# Patient Record
Sex: FEMALE | Race: WHITE | NOT HISPANIC OR LATINO | Employment: FULL TIME | URBAN - METROPOLITAN AREA
[De-identification: names, ages, dates, MRNs, and addresses within clinical notes are randomized per-mention and may not be internally consistent; named-entity substitution may affect disease eponyms.]

---

## 2017-04-11 ENCOUNTER — APPOINTMENT (EMERGENCY)
Dept: RADIOLOGY | Facility: HOSPITAL | Age: 42
End: 2017-04-11
Payer: COMMERCIAL

## 2017-04-11 ENCOUNTER — HOSPITAL ENCOUNTER (EMERGENCY)
Facility: HOSPITAL | Age: 42
Discharge: HOME | End: 2017-04-11
Admitting: EMERGENCY MEDICINE
Payer: COMMERCIAL

## 2017-04-11 VITALS
BODY MASS INDEX: 35 KG/M2 | OXYGEN SATURATION: 97 % | DIASTOLIC BLOOD PRESSURE: 62 MMHG | RESPIRATION RATE: 18 BRPM | WEIGHT: 205 LBS | HEIGHT: 64 IN | TEMPERATURE: 99.4 F | HEART RATE: 90 BPM | SYSTOLIC BLOOD PRESSURE: 122 MMHG

## 2017-04-11 DIAGNOSIS — S93.401A RIGHT ANKLE SPRAIN: Primary | ICD-10-CM

## 2017-04-11 PROCEDURE — 73610 X-RAY EXAM OF ANKLE: CPT

## 2017-04-11 PROCEDURE — A9270 NON-COVERED ITEM OR SERVICE: HCPCS | Performed by: PHYSICIAN ASSISTANT

## 2017-04-11 PROCEDURE — 99283 EMERGENCY DEPT VISIT LOW MDM: CPT

## 2017-04-11 RX ORDER — NAPROXEN 500 MG/1
500 TABLET ORAL ONCE
Status: COMPLETED | OUTPATIENT
Start: 2017-04-11 | End: 2017-04-11

## 2017-04-11 RX ORDER — ESCITALOPRAM OXALATE 10 MG/1
40 TABLET ORAL 2 TIMES DAILY
COMMUNITY
End: 2018-02-26

## 2017-04-11 RX ORDER — NAPROXEN 500 MG/1
500 TABLET ORAL 2 TIMES DAILY WITH MEALS
Qty: 20 TABLET | Refills: 0 | Status: SHIPPED | OUTPATIENT
Start: 2017-04-11 | End: 2018-02-26

## 2017-04-11 RX ADMIN — NAPROXEN 500 MG: 500 TABLET ORAL at 14:24

## 2018-02-26 ENCOUNTER — HOSPITAL ENCOUNTER (EMERGENCY)
Facility: HOSPITAL | Age: 43
Discharge: HOME | End: 2018-02-26
Payer: OTHER MISCELLANEOUS

## 2018-02-26 ENCOUNTER — APPOINTMENT (EMERGENCY)
Dept: RADIOLOGY | Facility: HOSPITAL | Age: 43
End: 2018-02-26
Payer: OTHER MISCELLANEOUS

## 2018-02-26 VITALS
TEMPERATURE: 99.8 F | WEIGHT: 225 LBS | OXYGEN SATURATION: 96 % | RESPIRATION RATE: 20 BRPM | BODY MASS INDEX: 38.41 KG/M2 | SYSTOLIC BLOOD PRESSURE: 133 MMHG | DIASTOLIC BLOOD PRESSURE: 85 MMHG | HEIGHT: 64 IN | HEART RATE: 83 BPM

## 2018-02-26 DIAGNOSIS — M62.838 MUSCLE SPASM: Primary | ICD-10-CM
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
## 2018-02-26 PROCEDURE — 99283 EMERGENCY DEPT VISIT LOW MDM: CPT

## 2018-02-26 PROCEDURE — 96372 THER/PROPH/DIAG INJ SC/IM: CPT

## 2018-02-26 PROCEDURE — 72070 X-RAY EXAM THORAC SPINE 2VWS: CPT

## 2018-02-26 PROCEDURE — 71046 X-RAY EXAM CHEST 2 VIEWS: CPT

## 2018-02-26 RX ORDER — TRAMADOL HYDROCHLORIDE 50 MG/1
50 TABLET ORAL ONCE
Status: COMPLETED | OUTPATIENT
Start: 2018-02-26 | End: 2018-02-26

## 2018-02-26 RX ORDER — ARIPIPRAZOLE 5 MG/1
5 TABLET ORAL DAILY
COMMUNITY
End: 2019-12-04

## 2018-02-26 RX ORDER — ESCITALOPRAM OXALATE 20 MG/1
20 TABLET ORAL DAILY
COMMUNITY
End: 2021-09-02 | Stop reason: SDUPTHER

## 2018-02-26 RX ORDER — KETOROLAC TROMETHAMINE 30 MG/ML
30 INJECTION, SOLUTION INTRAMUSCULAR; INTRAVENOUS ONCE
Status: COMPLETED | OUTPATIENT
Start: 2018-02-26 | End: 2018-02-26

## 2018-02-26 RX ORDER — CYCLOBENZAPRINE HCL 10 MG
10 TABLET ORAL 3 TIMES DAILY PRN
Qty: 12 TABLET | Refills: 0 | Status: SHIPPED | OUTPATIENT
Start: 2018-02-26 | End: 2018-02-28 | Stop reason: SDUPTHER

## 2018-02-26 RX ORDER — NAPROXEN 500 MG/1
500 TABLET ORAL 2 TIMES DAILY WITH MEALS
Qty: 20 TABLET | Refills: 0 | Status: SHIPPED | OUTPATIENT
Start: 2018-02-26 | End: 2019-12-04

## 2018-02-26 RX ADMIN — TRAMADOL HYDROCHLORIDE 50 MG: 50 TABLET, FILM COATED ORAL at 16:31

## 2018-02-26 RX ADMIN — KETOROLAC TROMETHAMINE 30 MG: 30 INJECTION, SOLUTION INTRAMUSCULAR at 17:35

## 2018-02-26 NOTE — DISCHARGE INSTRUCTIONS
Muscle Spasm   WHAT YOU NEED TO KNOW:   A muscle spasm is a sudden contraction of any muscle or group of muscles  A muscle cramp is a painful muscle spasm  Muscle cramps commonly occur after intense exercise or during pregnancy  They may also be caused by certain medications, dehydration, low calcium or magnesium levels, or another medical condition  DISCHARGE INSTRUCTIONS:   Medicines: You may need the following:  · NSAIDs  help decrease swelling and pain or fever  This medicine is available with or without a doctor's order  NSAIDs can cause stomach bleeding or kidney problems in certain people  If you take blood thinner medicine, always ask your healthcare provider if NSAIDs are safe for you  Always read the medicine label and follow directions  · Take your medicine as directed  Contact your healthcare provider if you think your medicine is not helping or if you have side effects  Tell him of her if you are allergic to any medicine  Keep a list of the medicines, vitamins, and herbs you take  Include the amounts, and when and why you take them  Bring the list or the pill bottles to follow-up visits  Carry your medicine list with you in case of an emergency  Follow up with your healthcare provider as directed: You may need other tests or treatment  You may also be referred to a physical therapist or other specialist  Write down your questions so you remember to ask them during your visits  Self-care:   · Stretch  your muscle to help relieve the cramp  It may be helpful to keep your muscle in the stretched position until the cramp is gone  · Apply heat  to help decrease pain and muscle spasms  Apply heat on the area for 20 to 30 minutes every 2 hours for as many days as directed  · Apply ice  to help decrease swelling and pain  Ice may also help prevent tissue damage  Use an ice pack, or put crushed ice in a plastic bag   Cover it with a towel and place it on your muscle for 15 to 20 minutes every hour or as directed  · Drink more liquids  to help prevent muscle cramps caused by dehydration  Sports drinks may help replace electrolytes you lose through sweat during exercise  Ask your healthcare provider how much liquid to drink each day and which liquids are best for you  · Eat healthy foods , such as fruits, vegetables, whole grains, low-fat dairy products, and lean proteins (meat, beans, and fish)  If you are pregnant, ask your healthcare provider about foods that are high in magnesium and sodium  They may help to relieve cramps during pregnancy  · Massage your muscle  to help relieve the cramp  · Take frequent deep breaths  until the cramp feels better  Lie down while you take the deep breaths so you do not get dizzy or lightheaded  Contact your healthcare provider if:   · You have signs of dehydration, such as a headache, dark yellow urine, dry eyes or mouth, or a fast heartbeat  · You have questions or concerns about your condition or care  Return to the emergency department if:   · You have warmth, swelling, or redness in the cramping muscle  · You have frequent or unrelieved muscle cramps in several different muscles  · You have muscle cramps with numbness, tingling, and burning in your hands and feet  © 2017 [address] Information is for End User's use only and may not be sold, redistributed or otherwise used for commercial purposes  All illustrations and images included in CareNotes® are the copyrighted property of [employer]  or Abelardo Valentin  The above information is an  only  It is not intended as medical advice for individual conditions or treatments  Talk to your doctor, nurse or pharmacist before following any medical regimen to see if it is safe and effective for you

## 2018-02-26 NOTE — ED PROVIDER NOTES
History  Chief Complaint   Patient presents with   Drea U  6  she slipped on water in [place] at work and fell to left side at 1pm  States pain mid and lower back  Denies striking head no LOC  37 y/o female the patient 4 hours ago while she was at work when she slipped on water landing directly onto her left side  Did not hit her head nor lose consciousness  Complaining of mid to lower back pain since that point that comes and goes and is spasm like  Took ibuprofen at 1pm  Mild headache  Ambulating without difficulty  The pain is currently 7/10 that is nonradiating  Denies nausea, vomiting, changes in vision, weakness, numbness, paresthesias, shortness of breath, chest pain, abdominal pain, hip or leg pain  Prior to Admission Medications   Prescriptions Last Dose Informant Patient Reported? Taking? ARIPiprazole (ABILIFY) 5 mg tablet  Self Yes Yes   Sig: Take 5 mg by mouth daily   escitalopram (LEXAPRO) 20 mg tablet 2018 at 0700  Yes Yes   Sig: Take 20 mg by mouth 2 (two) times a day      Facility-Administered Medications: None       Past Medical History:   Diagnosis Date    Psychiatric disorder        Past Surgical History:   Procedure Laterality Date     SECTION      HAND SURGERY      OVARIAN CYST DRAINAGE      WRIST SURGERY         History reviewed  No pertinent family history  I have reviewed and agree with the history as documented  Social History   Substance Use Topics    Smoking status: Former Smoker    Smokeless tobacco: Never Used    Alcohol use Yes        Review of Systems   Constitutional: Negative  Negative for activity change and appetite change  HENT: Negative  Eyes: Negative  Respiratory: Negative  Cardiovascular: Negative  Gastrointestinal: Negative  Genitourinary: Negative  Musculoskeletal: Positive for arthralgias and back pain  Negative for gait problem, joint swelling, myalgias, neck pain and neck stiffness  Skin: Negative  Neurological: Positive for headaches  Negative for dizziness, tremors, seizures, syncope, facial asymmetry, speech difficulty, weakness, light-headedness and numbness  All other systems reviewed and are negative  Physical Exam  ED Triage Vitals [02/26/18 1542]   Temperature Pulse Respirations Blood Pressure SpO2   99 8 °F (37 7 °C) 83 20 133/85 96 %      Temp Source Heart Rate Source Patient Position - Orthostatic VS BP Location FiO2 (%)   Tympanic Monitor Sitting Left arm --      Pain Score       7           Orthostatic Vital Signs  Vitals:    02/26/18 1542   BP: 133/85   Pulse: 83   Patient Position - Orthostatic VS: Sitting       Physical Exam   Constitutional: She appears well-developed and well-nourished  HENT:   Head: Normocephalic and atraumatic  Right Ear: External ear normal    Left Ear: External ear normal    Nose: Nose normal    Mouth/Throat: Oropharynx is clear and moist    Eyes: Conjunctivae and EOM are normal  Pupils are equal, round, and reactive to light  Neck: Normal range of motion  Neck supple  Cardiovascular: Normal rate, regular rhythm, normal heart sounds and intact distal pulses  Exam reveals no gallop and no friction rub  No murmur heard  Pulmonary/Chest: Effort normal and breath sounds normal  No respiratory distress  She has no wheezes  She has no rales  She exhibits no tenderness  spo2 is 96% indicating adequate oxygenation  Abdominal: Soft  Bowel sounds are normal  She exhibits no distension and no mass  There is no tenderness  There is no rebound and no guarding  No hernia  Musculoskeletal:        Arms:  Neurological: She is alert  She has normal strength  No cranial nerve deficit or sensory deficit  GCS eye subscore is 4  GCS verbal subscore is 5  GCS motor subscore is 6  Strength and sensation 5/5  Diffusely  Skin: Skin is warm and dry  Capillary refill takes less than 2 seconds  Nursing note and vitals reviewed        ED Medications  Medications traMADol (ULTRAM) tablet 50 mg (50 mg Oral Given 2/26/18 1631)       Diagnostic Studies  Results Reviewed     None                 XR thoracic spine 2 views    (Results Pending)   XR chest 2 views    (Results Pending)              Procedures  Procedures       Phone Contacts  ED Phone Contact    ED Course  ED Course                                MDM  Number of Diagnoses or Management Options  Diagnosis management comments: Will treat for muscle spasms with Flexeril, informed not to drive or operate heavy machinery while taking  Strict return precautions for any worsening symptoms  Patient's injury occurred over 5 hours ago and does not have any neurological changes however if her headaches persist she should follow up with the concussion Clinic  Patient is informed to return to the emergency department for worsening of symptoms and was given proper education regarding their diagnosis and symptoms  Otherwise the patient is informed to follow up with their primary care doctor for re-evaluation  The patient verbalizes understanding and agrees with above assessment and plan  All questions were answered  Please Note: Fluency Direct voice recognition software may have been used in the creation of this document  Wrong words or sound a like substitutions may have occurred due to the inherent limitations of the voice software             Amount and/or Complexity of Data Reviewed  Tests in the radiology section of CPT®: reviewed and ordered  Review and summarize past medical records: yes  Independent visualization of images, tracings, or specimens: yes      CritCare Time    Disposition  Final diagnoses:   Muscle spasm   Fall, initial encounter     Time reflects when diagnosis was documented in both MDM as applicable and the Disposition within this note     Time User Action Codes Description Comment    2/26/2018  5:20 PM Pauline Kawasaki Add [A07 113] Muscle spasm     2/26/2018  5:20 PM Pauline Kawasaki Add [W19 XXXA] Fall, initial encounter       ED Disposition     ED Disposition Condition Comment    Discharge  Camryn discharge to home/self care  Condition at discharge: Good        Follow-up Information     Follow up With Specialties Details Why Contact Info Additional [address] Emergency Department Emergency Medicine Go to If symptoms worsen such as double vision, weakness  [address] [facility], [place], [place], [address]    Noelle Mccormack MD Orthopedic Surgery Schedule an appointment as soon as possible for a visit if headaches persist  [address]  [phone]           Patient's Medications   Discharge Prescriptions    CYCLOBENZAPRINE (FLEXERIL) 10 MG TABLET    Take 1 tablet (10 mg total) by mouth 3 (three) times a day as needed for muscle spasms for up to 4 days       Start Date: 2/26/2018 End Date: 3/2/2018       Order Dose: 10 mg       Quantity: 12 tablet    Refills: 0    NAPROXEN (NAPROSYN) 500 MG TABLET    Take 1 tablet (500 mg total) by mouth 2 (two) times a day with meals for 10 days       Start Date: 2/26/2018 End Date: 3/8/2018       Order Dose: 500 mg       Quantity: 20 tablet    Refills: 0     No discharge procedures on file      ED Provider  Electronically Signed by           Landry Fisher PA-C  02/26/18 7857

## 2018-02-28 ENCOUNTER — OFFICE VISIT (OUTPATIENT)
Dept: FAMILY MEDICINE CLINIC | Facility: CLINIC | Age: 43
End: 2018-02-28
Payer: OTHER MISCELLANEOUS

## 2018-02-28 VITALS
HEART RATE: 88 BPM | TEMPERATURE: 97.2 F | RESPIRATION RATE: 20 BRPM | WEIGHT: 225 LBS | DIASTOLIC BLOOD PRESSURE: 70 MMHG | SYSTOLIC BLOOD PRESSURE: 126 MMHG | HEIGHT: 64 IN | BODY MASS INDEX: 38.41 KG/M2

## 2018-02-28 DIAGNOSIS — M62.838 MUSCLE SPASM: ICD-10-CM

## 2018-02-28 DIAGNOSIS — S39.012A BACK STRAIN, INITIAL ENCOUNTER: Primary | ICD-10-CM

## 2018-02-28 PROCEDURE — 99202 OFFICE O/P NEW SF 15 MIN: CPT | Performed by: NURSE PRACTITIONER

## 2018-02-28 RX ORDER — PREDNISONE 10 MG/1
TABLET ORAL
Qty: 30 TABLET | Refills: 0 | Status: SHIPPED | OUTPATIENT
Start: 2018-02-28 | End: 2018-03-12

## 2018-02-28 RX ORDER — CYCLOBENZAPRINE HCL 10 MG
10 TABLET ORAL
Qty: 6 TABLET | Refills: 0 | Status: SHIPPED | OUTPATIENT
Start: 2018-02-28 | End: 2019-12-04

## 2018-02-28 RX ORDER — ACETAMINOPHEN AND CODEINE PHOSPHATE 300; 30 MG/1; MG/1
1 TABLET ORAL EVERY 8 HOURS PRN
Qty: 12 TABLET | Refills: 0 | Status: SHIPPED | OUTPATIENT
Start: 2018-02-28 | End: 2018-03-04

## 2018-02-28 NOTE — LETTER
February 28, 2018     Patient: Maria C Latham   YOB: 1975   Date of Visit: 2/28/2018       To Whom it May Concern:    Maria C Latham is under my professional care  She was seen in my office on 2/28/2018 and can return to work on 3/5/2018 if symptom free and follow up on 3/5/2018 if still having issues  If you have any questions or concerns, please don't hesitate to call           Sincerely,          RENATO Patel        CC: No Recipients

## 2018-02-28 NOTE — PATIENT INSTRUCTIONS
Take prednisone with food in morning and do not take any NSAID's while taking prednisone  Do not drive and operate any machinery after taking muscle relaxant  Supportive care discussed and advised  Follow up for no improvement and worsening of conditions  Patient advised and educated when to see immediate medical care  Acute Low Back Pain   AMBULATORY CARE:   Acute low back pain  is sudden discomfort in your lower back area that lasts for up to 6 weeks  The discomfort makes it difficult to tolerate activity  Common symptoms include the following:   · Back stiffness or spasms    · Pain down the back or side of one leg    · Holding yourself in an unusual position or posture to decrease your back pain    · Not being able to find a sitting position that is comfortable    · Slow increase in your pain for 24 to 48 hours after you stress your back    · Tenderness on your lower back or severe pain when you move your back  Seek immediate care for the following symptoms:   · Severe pain    · Sudden stiffness and heaviness in both buttocks down to both legs    · Numbness or weakness in one leg, or pain in both legs    · Numbness in your genital area or across your lower back    · Unable to control your urine or bowel movements  Contact your healthcare provider if:   · You have a fever  · You have pain at night or when you rest     · Your pain does not get better with treatment  · You have pain that worsens when you cough or sneeze  · You suddenly feel something pop or snap in your back  · You have questions or concerns about your condition or care  The goal of treatment for acute low back pain  is to relieve your pain and help you tolerate activity  Most people with acute lower back pain get better within 4 to 6 weeks  You may need any of the following:  · Acetaminophen  decreases pain  It is available without a doctor's order  Ask how much to take and how often to take it  Follow directions  Acetaminophen can cause liver damage if not taken correctly  · NSAIDs  help decrease swelling and pain  This medicine is available with or without a doctor's order  NSAIDs can cause stomach bleeding or kidney problems in certain people  If you take blood thinner medicine, always ask your healthcare provider if NSAIDs are safe for you  Always read the medicine label and follow directions  · Prescription pain medicine  may be given  Ask your healthcare provider how to take this medicine safely  · Muscle relaxers  decrease pain by relaxing the muscles in your lower spine  Manage your symptoms:   · Stay active  as much as you can without causing more pain  Bed rest could make your back pain worse  Start with some light exercises such as walking  Avoid heavy lifting until your pain is gone  Ask for more information about the activities or exercises that are right for you  · Ice  helps decrease swelling, pain, and muscle spams  Put crushed ice in a plastic bag  Cover it with a towel  Place it on your lower back for 20 to 30 minutes every 2 hours  Do this for about 2 to 3 days after your pain starts, or as directed  · Heat  helps decrease pain and muscle spasms  Start to use heat after treatment with ice has stopped  Use a small towel dampened with warm water or a heating pad, or sit in a warm bath  Apply heat on the area for 20 to 30 minutes every 2 hours for as many days as directed  Alternate heat and ice  Prevent acute low back pain:   · Use proper body mechanics  ¨ Bend at the hips and knees when you  objects  Do not bend from the waist  Use your leg muscles as you lift the load  Do not use your back  Keep the object close to your chest as you lift it  Try not to twist or lift anything above your waist     ¨ Change your position often when you stand for long periods of time  Rest one foot on a small box or footrest, and then switch to the other foot often      ¨ Try not to sit for long periods of time  When you do, sit in a straight-backed chair with your feet flat on the floor  Never reach, pull, or push while you are sitting  · Do exercises that strengthen your back muscles  Warm up before you exercise  Ask your healthcare provider the best exercises for you  · Maintain a healthy weight  Ask your healthcare provider how much you should weigh  Ask him to help you create a weight loss plan if you are overweight  Follow up with your healthcare provider as directed:  Return for a follow-up visit if you still have pain after 1 to 3 weeks of treatment  You may need to visit an orthopedist if your back pain lasts more than 12 weeks  Write down your questions so you remember to ask them during your visits  © 2017 [address] [facility] Information is for End User's use only and may not be sold, redistributed or otherwise used for commercial purposes  All illustrations and images included in CareNotes® are the copyrighted property of A [employer]  or Abelardo Valentin  The above information is an  only  It is not intended as medical advice for individual conditions or treatments  Talk to your doctor, nurse or pharmacist before following any medical regimen to see if it is safe and effective for you

## 2018-02-28 NOTE — PROGRESS NOTES
Assessment/Plan:  Stop naprosyn  Take prednisone with food in morning and do not take any NSAID's while taking prednisone  Do not drive and operate any machinery after taking muscle relaxant  Supportive care discussed and advised  Follow up for no improvement and worsening of conditions  Patient advised and educated when to see immediate medical care  Diagnoses and all orders for this visit:    Back strain, initial encounter  -     predniSONE 10 mg tablet; Take 4 pills/d for 3 days then 3 pills/d for 3 days, then 2 pills/d for 3 days then 1 pill/d for 3 days  -     acetaminophen-codeine (TYLENOL #3) 300-30 mg per tablet; Take 1 tablet by mouth every 8 (eight) hours as needed for moderate pain for up to 4 days (Collaborative PHYS: Dr Lucian Bunch, License # [id number])    Muscle spasm  -     cyclobenzaprine (FLEXERIL) 10 mg tablet; Take 1 tablet (10 mg total) by mouth daily at bedtime for 6 days  -     acetaminophen-codeine (TYLENOL #3) 300-30 mg per tablet; Take 1 tablet by mouth every 8 (eight) hours as needed for moderate pain for up to 4 days (Collaborative PHYS: Dr Lucian Bunch, License # [id number])    BMI 36 0-36 9,adult          Return for Return on 3/5/18 if still having symptoms  Subjective:      Patient ID: Tl Munoz is a 38 y o  female  Chief Complaint   Patient presents with    Fall     02/26/2018  at [facility] in [facility] [address] was wet from water bottle  [id number]       HPI   Patient fell at work on 2/26 and as slipped on the water and landed on the left side and went to ED due to mid to lower back  Patient was discharged on naprosyn and flexeril and stated that did not help and having pain in the mid to lower back with spasms  Denies weakness, numbness and tingling of any extremities  Denies headache and dizziness       The following portions of the patient's history were reviewed and updated as appropriate: allergies, current medications, past family history, past medical history, past social history, past surgical history and problem list       Review of Systems   Constitutional: Negative  Respiratory: Negative  Cardiovascular: Negative  Musculoskeletal: Positive for back pain  Negative for arthralgias, gait problem, joint swelling, myalgias, neck pain and neck stiffness  Neurological: Negative  Objective:    History   Smoking Status    Former Smoker   Smokeless Tobacco    Never Used       Allergies: Allergies   Allergen Reactions    Duricef [Cefadroxil]     Penicillins        Vitals:  /70   Pulse 88   Temp (!) 97 2 °F (36 2 °C)   Resp 20   Ht 5' 4" (1 626 m)   Wt 102 kg (225 lb)   BMI 38 62 kg/m²     Current Outpatient Prescriptions   Medication Sig Dispense Refill    ARIPiprazole (ABILIFY) 5 mg tablet Take 5 mg by mouth daily      cyclobenzaprine (FLEXERIL) 10 mg tablet Take 1 tablet (10 mg total) by mouth daily at bedtime for 6 days 6 tablet 0    escitalopram (LEXAPRO) 20 mg tablet Take 20 mg by mouth 2 (two) times a day      naproxen (NAPROSYN) 500 mg tablet Take 1 tablet (500 mg total) by mouth 2 (two) times a day with meals for 10 days 20 tablet 0    acetaminophen-codeine (TYLENOL #3) 300-30 mg per tablet Take 1 tablet by mouth every 8 (eight) hours as needed for moderate pain for up to 4 days (Collaborative PHYS: Dr Delta King, License # [id number]) 12 tablet 0    predniSONE 10 mg tablet Take 4 pills/d for 3 days then 3 pills/d for 3 days, then 2 pills/d for 3 days then 1 pill/d for 3 days 30 tablet 0     No current facility-administered medications for this visit  Physical Exam   Constitutional: She is oriented to person, place, and time  She appears well-developed and well-nourished  Cardiovascular: Normal rate, regular rhythm and normal heart sounds  Pulmonary/Chest: Effort normal and breath sounds normal    Musculoskeletal: Normal range of motion  She exhibits tenderness  She exhibits no edema or deformity  Arms:  Neurological: She is alert and oriented to person, place, and time  She has normal reflexes  Skin: Skin is warm and dry  Psychiatric: She has a normal mood and affect   Her behavior is normal  Judgment and thought content normal          RENATO Thompson

## 2018-03-05 ENCOUNTER — OFFICE VISIT (OUTPATIENT)
Dept: FAMILY MEDICINE CLINIC | Facility: CLINIC | Age: 43
End: 2018-03-05
Payer: OTHER MISCELLANEOUS

## 2018-03-05 VITALS
BODY MASS INDEX: 42.17 KG/M2 | WEIGHT: 247 LBS | SYSTOLIC BLOOD PRESSURE: 122 MMHG | DIASTOLIC BLOOD PRESSURE: 80 MMHG | HEIGHT: 64 IN | TEMPERATURE: 96.3 F | HEART RATE: 76 BPM | RESPIRATION RATE: 16 BRPM

## 2018-03-05 DIAGNOSIS — M62.838 MUSCLE SPASM: ICD-10-CM

## 2018-03-05 DIAGNOSIS — S39.012S BACK STRAIN, SEQUELA: Primary | ICD-10-CM

## 2018-03-05 PROCEDURE — 99213 OFFICE O/P EST LOW 20 MIN: CPT | Performed by: NURSE PRACTITIONER

## 2018-03-05 RX ORDER — VALACYCLOVIR HYDROCHLORIDE 500 MG/1
500 TABLET, FILM COATED ORAL DAILY
COMMUNITY
Start: 2018-01-05 | End: 2021-02-25 | Stop reason: SDUPTHER

## 2018-03-05 RX ORDER — ACETAMINOPHEN AND CODEINE PHOSPHATE 300; 30 MG/1; MG/1
1 TABLET ORAL EVERY 8 HOURS PRN
Qty: 6 TABLET | Refills: 0 | Status: SHIPPED | OUTPATIENT
Start: 2018-03-05 | End: 2018-03-07

## 2018-03-05 RX ORDER — TOPIRAMATE 100 MG/1
TABLET, FILM COATED ORAL
Refills: 0 | COMMUNITY
Start: 2018-02-28 | End: 2019-12-04

## 2018-03-05 RX ORDER — PHENTERMINE HYDROCHLORIDE 37.5 MG/1
TABLET ORAL
COMMUNITY
Start: 2018-02-26 | End: 2019-12-04

## 2018-03-05 RX ORDER — ACETAMINOPHEN AND CODEINE PHOSPHATE 300; 30 MG/1; MG/1
1 TABLET ORAL EVERY 8 HOURS PRN
Qty: 6 TABLET | Refills: 0 | Status: SHIPPED | OUTPATIENT
Start: 2018-03-05 | End: 2018-03-05 | Stop reason: SDUPTHER

## 2018-03-05 RX ORDER — ACETAMINOPHEN AND CODEINE PHOSPHATE 300; 30 MG/1; MG/1
1 TABLET ORAL EVERY 8 HOURS PRN
Qty: 30 TABLET | Refills: 0 | Status: SHIPPED | OUTPATIENT
Start: 2018-03-05 | End: 2018-03-05 | Stop reason: CLARIF

## 2018-03-05 NOTE — PROGRESS NOTES
Assessment/Plan:  Finish prednisone and flexeril  Improved than before  Take extra days rest as improving  Supportive care discussed and advised  Follow up for no improvement and worsening of conditions  Patient advised and educated when to see immediate medical care  Diagnoses and all orders for this visit:    Back strain, sequela  -     acetaminophen-codeine (TYLENOL #3) 300-30 mg per tablet; Take 1 tablet by mouth every 8 (eight) hours as needed for moderate pain for up to 2 days (Collaborative PHYS: Dr Charles Beard, License # [id number])    Muscle spasm  -     acetaminophen-codeine (TYLENOL #3) 300-30 mg per tablet; Take 1 tablet by mouth every 8 (eight) hours as needed for moderate pain for up to 2 days (Collaborative PHYS: Dr Charles Beard, License # [id number])    BMI 40 0-44 9, [facility])    Other orders  -     phentermine (ADIPEX-P) 37 5 MG tablet;   -     topiramate (TOPAMAX) 100 mg tablet;   -     valACYclovir (VALTREX) 500 mg tablet;           Return if symptoms worsen or fail to improve  Subjective:      Patient ID: Samir Byrd is a 38 y o  female  Chief Complaint   Patient presents with    workers comp  back pain follow up , lower back       HPI   Patient here for follow up on work injury and still taking prednisone and flexeril  Patient stated that it is improved and not radiating to her upper back and just staying on lower back with spasms  Denies any weakness, numbness and tingling in any extremities  The following portions of the patient's history were reviewed and updated as appropriate: allergies, current medications, past family history, past medical history, past social history, past surgical history and problem list       Review of Systems   Constitutional: Negative  Respiratory: Negative  Cardiovascular: Negative  Musculoskeletal: Positive for back pain  Negative for gait problem, joint swelling, myalgias, neck pain and neck stiffness  Neurological: Negative  Psychiatric/Behavioral: Negative  Objective:    History   Smoking Status    Former Smoker   Smokeless Tobacco    Never Used       Allergies: Allergies   Allergen Reactions    Duricef [Cefadroxil]     Penicillins        Vitals:  /80   Pulse 76   Temp (!) 96 3 °F (35 7 °C)   Resp 16   Ht 5' 4" (1 626 m)   Wt 112 kg (247 lb)   BMI 42 40 kg/m²     Current Outpatient Prescriptions   Medication Sig Dispense Refill    ARIPiprazole (ABILIFY) 5 mg tablet Take 5 mg by mouth daily      cyclobenzaprine (FLEXERIL) 10 mg tablet Take 1 tablet (10 mg total) by mouth daily at bedtime for 6 days 6 tablet 0    escitalopram (LEXAPRO) 20 mg tablet Take 20 mg by mouth 2 (two) times a day      phentermine (ADIPEX-P) 37 5 MG tablet       predniSONE 10 mg tablet Take 4 pills/d for 3 days then 3 pills/d for 3 days, then 2 pills/d for 3 days then 1 pill/d for 3 days 30 tablet 0    topiramate (TOPAMAX) 100 mg tablet   0    valACYclovir (VALTREX) 500 mg tablet       acetaminophen-codeine (TYLENOL #3) 300-30 mg per tablet Take 1 tablet by mouth every 8 (eight) hours as needed for moderate pain for up to 2 days (Collaborative PHYS: Dr Luis Alfredo Bahena, License # [id number]) 6 tablet 0    naproxen (NAPROSYN) 500 mg tablet Take 1 tablet (500 mg total) by mouth 2 (two) times a day with meals for 10 days 20 tablet 0     No current facility-administered medications for this visit  Physical Exam   Constitutional: She is oriented to person, place, and time  She appears well-developed and well-nourished  Cardiovascular: Normal rate, regular rhythm and normal heart sounds  Pulmonary/Chest: Effort normal and breath sounds normal    Musculoskeletal: Normal range of motion  She exhibits tenderness  She exhibits no edema  Tender on bilateral paraspinal musculature  Neurological: She is alert and oriented to person, place, and time  She has normal reflexes  Psychiatric: She has a normal mood and affect   Her behavior is normal  Judgment and thought content normal          RENATO Gonsalez

## 2018-03-05 NOTE — PATIENT INSTRUCTIONS
Finish prednisone and flexeril  Take extra days rest   Supportive care discussed and advised  Follow up for no improvement and worsening of conditions  Patient advised and educated when to see immediate medical care  Acute Low Back Pain   AMBULATORY CARE:   Acute low back pain  is sudden discomfort in your lower back area that lasts for up to 6 weeks  The discomfort makes it difficult to tolerate activity  Common symptoms include the following:   · Back stiffness or spasms    · Pain down the back or side of one leg    · Holding yourself in an unusual position or posture to decrease your back pain    · Not being able to find a sitting position that is comfortable    · Slow increase in your pain for 24 to 48 hours after you stress your back    · Tenderness on your lower back or severe pain when you move your back  Seek immediate care for the following symptoms:   · Severe pain    · Sudden stiffness and heaviness in both buttocks down to both legs    · Numbness or weakness in one leg, or pain in both legs    · Numbness in your genital area or across your lower back    · Unable to control your urine or bowel movements  Contact your healthcare provider if:   · You have a fever  · You have pain at night or when you rest     · Your pain does not get better with treatment  · You have pain that worsens when you cough or sneeze  · You suddenly feel something pop or snap in your back  · You have questions or concerns about your condition or care  The goal of treatment for acute low back pain  is to relieve your pain and help you tolerate activity  Most people with acute lower back pain get better within 4 to 6 weeks  You may need any of the following:  · Acetaminophen  decreases pain  It is available without a doctor's order  Ask how much to take and how often to take it  Follow directions  Acetaminophen can cause liver damage if not taken correctly  · NSAIDs  help decrease swelling and pain   This medicine is available with or without a doctor's order  NSAIDs can cause stomach bleeding or kidney problems in certain people  If you take blood thinner medicine, always ask your healthcare provider if NSAIDs are safe for you  Always read the medicine label and follow directions  · Prescription pain medicine  may be given  Ask your healthcare provider how to take this medicine safely  · Muscle relaxers  decrease pain by relaxing the muscles in your lower spine  Manage your symptoms:   · Stay active  as much as you can without causing more pain  Bed rest could make your back pain worse  Start with some light exercises such as walking  Avoid heavy lifting until your pain is gone  Ask for more information about the activities or exercises that are right for you  · Ice  helps decrease swelling, pain, and muscle spams  Put crushed ice in a plastic bag  Cover it with a towel  Place it on your lower back for 20 to 30 minutes every 2 hours  Do this for about 2 to 3 days after your pain starts, or as directed  · Heat  helps decrease pain and muscle spasms  Start to use heat after treatment with ice has stopped  Use a small towel dampened with warm water or a heating pad, or sit in a warm bath  Apply heat on the area for 20 to 30 minutes every 2 hours for as many days as directed  Alternate heat and ice  Prevent acute low back pain:   · Use proper body mechanics  ¨ Bend at the hips and knees when you  objects  Do not bend from the waist  Use your leg muscles as you lift the load  Do not use your back  Keep the object close to your chest as you lift it  Try not to twist or lift anything above your waist     ¨ Change your position often when you stand for long periods of time  Rest one foot on a small box or footrest, and then switch to the other foot often  ¨ Try not to sit for long periods of time  When you do, sit in a straight-backed chair with your feet flat on the floor   Never reach, pull, or push while you are sitting  · Do exercises that strengthen your back muscles  Warm up before you exercise  Ask your healthcare provider the best exercises for you  · Maintain a healthy weight  Ask your healthcare provider how much you should weigh  Ask him to help you create a weight loss plan if you are overweight  Follow up with your healthcare provider as directed:  Return for a follow-up visit if you still have pain after 1 to 3 weeks of treatment  You may need to visit an orthopedist if your back pain lasts more than 12 weeks  Write down your questions so you remember to ask them during your visits  © 2017 [employer] Information is for End User's use only and may not be sold, redistributed or otherwise used for commercial purposes  All illustrations and images included in CareNotes® are the copyrighted property of A D A M , Inc  or Abelardo Valentin  The above information is an  only  It is not intended as medical advice for individual conditions or treatments  Talk to your doctor, nurse or pharmacist before following any medical regimen to see if it is safe and effective for you

## 2018-03-05 NOTE — LETTER
March 5, 2018     Patient: Alejandra Jacobo   YOB: 1975   Date of Visit: 3/5/2018       To Whom it May Concern:    Alejandra Jacobo is under my professional care  She was seen in my office on 3/5/2018  She may return to work on 03/8/2018  If you have any questions or concerns, please don't hesitate to call           Sincerely,          Guyann Goodpasture, CRNP        CC: No Recipients

## 2019-12-04 ENCOUNTER — HOSPITAL ENCOUNTER (EMERGENCY)
Facility: HOSPITAL | Age: 44
Discharge: HOME | End: 2019-12-04
Attending: EMERGENCY MEDICINE
Payer: OTHER MISCELLANEOUS

## 2019-12-04 ENCOUNTER — APPOINTMENT (EMERGENCY)
Dept: RADIOLOGY | Facility: HOSPITAL | Age: 44
End: 2019-12-04
Payer: OTHER MISCELLANEOUS

## 2019-12-04 ENCOUNTER — TELEPHONE (OUTPATIENT)
Dept: OBGYN CLINIC | Facility: HOSPITAL | Age: 44
End: 2019-12-04

## 2019-12-04 VITALS
HEIGHT: 64 IN | BODY MASS INDEX: 46.1 KG/M2 | HEART RATE: 72 BPM | WEIGHT: 270 LBS | RESPIRATION RATE: 18 BRPM | TEMPERATURE: 97 F | DIASTOLIC BLOOD PRESSURE: 96 MMHG | SYSTOLIC BLOOD PRESSURE: 163 MMHG | OXYGEN SATURATION: 97 %

## 2019-12-04 DIAGNOSIS — M25.521 ELBOW PAIN, RIGHT: Primary | ICD-10-CM

## 2019-12-04 PROCEDURE — 73080 X-RAY EXAM OF ELBOW: CPT

## 2019-12-04 PROCEDURE — 99283 EMERGENCY DEPT VISIT LOW MDM: CPT

## 2019-12-04 RX ORDER — TRAMADOL HYDROCHLORIDE 50 MG/1
50 TABLET ORAL EVERY 6 HOURS PRN
Qty: 12 TABLET | Refills: 0 | Status: SHIPPED | OUTPATIENT
Start: 2019-12-04 | End: 2019-12-07

## 2019-12-04 RX ORDER — TRAMADOL HYDROCHLORIDE 50 MG/1
50 TABLET ORAL ONCE
Status: COMPLETED | OUTPATIENT
Start: 2019-12-04 | End: 2019-12-04

## 2019-12-04 RX ADMIN — TRAMADOL HYDROCHLORIDE 50 MG: 50 TABLET, FILM COATED ORAL at 08:37

## 2019-12-04 NOTE — ED PROVIDER NOTES
History  Chief Complaint   Patient presents with    Elbow Pain     Pt sts slipped and fell yesterday  Injured rt elbow  Pain rt arm and elbow  Patient is a 40-year-old female presents with complaint of a mechanical fall landing on her right forearm yesterday  Patient states the pain is constant it is nonradiating it is worse with flexion and extension of the right upper extremity  Patient denies any numbness or weakness to her hand  Patient states she has been taking Motrin over-the-counter with relief of the pain  Patient denies any head neck or back pain  Prior to Admission Medications   Prescriptions Last Dose Informant Patient Reported? Taking?   escitalopram (LEXAPRO) 20 mg tablet   Yes No   Sig: Take 20 mg by mouth 2 (two) times a day   valACYclovir (VALTREX) 500 mg tablet   Yes No      Facility-Administered Medications: None       Past Medical History:   Diagnosis Date    Psychiatric disorder        Past Surgical History:   Procedure Laterality Date     SECTION      ENDOMETRIAL ABLATION      HAND SURGERY      OVARIAN CYST DRAINAGE      WRIST SURGERY         History reviewed  No pertinent family history  I have reviewed and agree with the history as documented  Social History     Tobacco Use    Smoking status: Former Smoker    Smokeless tobacco: Never Used   Substance Use Topics    Alcohol use: Yes     Comment: social    Drug use: No        Review of Systems   Constitutional: Negative for chills and fever  Respiratory: Negative for chest tightness and shortness of breath  Cardiovascular: Negative for chest pain and leg swelling  Gastrointestinal: Negative for nausea and vomiting  Musculoskeletal: Negative for joint swelling and neck pain  Skin: Negative  Neurological: Negative for weakness and numbness  Hematological: Negative  Psychiatric/Behavioral: Negative          Physical Exam  Physical Exam   Constitutional: She is oriented to person, place, and time  She appears well-developed and well-nourished  HENT:   Head: Normocephalic and atraumatic  Cardiovascular: Normal rate and regular rhythm  Pulmonary/Chest: Effort normal and breath sounds normal    Musculoskeletal: She exhibits no edema  Right elbow: She exhibits decreased range of motion  She exhibits no swelling, no effusion and no deformity  Tenderness found  Radial head tenderness noted  No medial epicondyle, no lateral epicondyle and no olecranon process tenderness noted  Neurological: She is alert and oriented to person, place, and time  Skin: Skin is warm and dry  Psychiatric: She has a normal mood and affect  Nursing note and vitals reviewed  Vital Signs  ED Triage Vitals [12/04/19 0818]   Temperature Pulse Respirations Blood Pressure SpO2   (!) 97 °F (36 1 °C) 72 18 163/96 97 %      Temp src Heart Rate Source Patient Position - Orthostatic VS BP Location FiO2 (%)   -- -- -- -- --      Pain Score       8           Vitals:    12/04/19 0818   BP: 163/96   Pulse: 72         Visual Acuity      ED Medications  Medications   traMADol (ULTRAM) tablet 50 mg (50 mg Oral Given 12/4/19 0837)       Diagnostic Studies  Results Reviewed     None                 XR elbow 3+ views RIGHT   Final Result by Nikhil Bennett MD (12/04 1548)      No acute osseous abnormality  Workstation performed: [id number]                    Procedures  Procedures         ED Course                               MDM  Number of Diagnoses or Management Options  Elbow pain, right:   Diagnosis management comments: Patient's x-ray showed no acute fractures or dislocation  The patient is going to do conservative treatment with a sling ice rest and anti-inflammatories  Patient was instructed to follow up with an orthopedist if there is no improvement with conservative treatment  Patient states this is a workman's comp case and she can follow up with her workman's comp doctors    I answered all questions best my ability, the patient verbalizes understanding and is in agreement with the assessment plan  Amount and/or Complexity of Data Reviewed  Tests in the radiology section of CPT®: ordered and reviewed          Disposition  Final diagnoses:   Elbow pain, right     Time reflects when diagnosis was documented in both MDM as applicable and the Disposition within this note     Time User Action Codes Description Comment    12/4/2019  9:03 AM Memo Martinez Add [M25 521] Elbow pain, right       ED Disposition     ED Disposition Condition Date/Time Comment    Discharge Stable Wed Dec 4, 2019  9:03 AM Ainsley Munoz discharge to home/self care  Follow-up Information     Follow up With Specialties Details Why Contact Info    Your orthopedist  Schedule an appointment as soon as possible for a visit in 1 week            Patient's Medications   Discharge Prescriptions    TRAMADOL (ULTRAM) 50 MG TABLET    Take 1 tablet (50 mg total) by mouth every 6 (six) hours as needed for moderate pain for up to 3 days       Start Date: 12/4/2019 End Date: 12/7/2019       Order Dose: 50 mg       Quantity: 12 tablet    Refills: 0     No discharge procedures on file      ED Provider  Electronically Signed by           Thomas Jamil MD  12/04/19 6006

## 2019-12-04 NOTE — TELEPHONE ENCOUNTER
Henri Zamarripa Workers Compensation information    DOI:  12/03/2019  Employer:  [employer] [place]:  [place]  Billing Address:   [address] [facility]  Phone:  [phone]  Fax:  [phone] #: [phone]    If an MRI is required, please call [employer] for authorization at [phone]

## 2019-12-07 ENCOUNTER — OFFICE VISIT (OUTPATIENT)
Dept: OBGYN CLINIC | Facility: CLINIC | Age: 44
End: 2019-12-07
Payer: OTHER MISCELLANEOUS

## 2019-12-07 VITALS
HEIGHT: 64 IN | DIASTOLIC BLOOD PRESSURE: 85 MMHG | BODY MASS INDEX: 37.56 KG/M2 | SYSTOLIC BLOOD PRESSURE: 123 MMHG | WEIGHT: 220 LBS | HEART RATE: 79 BPM

## 2019-12-07 DIAGNOSIS — M25.521 RIGHT ELBOW PAIN: Primary | ICD-10-CM

## 2019-12-07 PROCEDURE — 99203 OFFICE O/P NEW LOW 30 MIN: CPT | Performed by: ORTHOPAEDIC SURGERY

## 2019-12-07 RX ORDER — TRAZODONE HYDROCHLORIDE 50 MG/1
TABLET ORAL
Refills: 0 | COMMUNITY
Start: 2019-10-14 | End: 2021-05-05 | Stop reason: SDUPTHER

## 2019-12-07 RX ORDER — NAPROXEN 500 MG/1
500 TABLET ORAL 2 TIMES DAILY WITH MEALS
Qty: 60 TABLET | Refills: 0 | Status: SHIPPED | OUTPATIENT
Start: 2019-12-07 | End: 2021-09-01

## 2019-12-07 NOTE — PROGRESS NOTES
Assessment/Plan:  1  Right elbow pain  naproxen (NAPROSYN) 500 mg tablet       Scribe Attestation    I,:   Mckayla Rodriguez am acting as a scribe while in the presence of the attending physician :        I,:   Saturnino Nyhan, DO personally performed the services described in this documentation    as scribed in my presence :          [facility] has right sided elbow pain concerning for a possible occult radial head fracture  We discussed that even though her x-ray does not show a fracture at this time there is the possibility of a hairline fracture that is not visible on the x-ray  If there is a fracture we discussed that the best treatment is gentle range of motion  She does not need to wear the sling at this time  She was given a prescription for naproxen which she should take with food at breakfast and dinner  She can continue to ice the area as needed  She was also given a note allowing her to work from home at this time  She will follow up in 1 week for repeat evaluation and x-ray  Subjective:   Jair David is a 37 y o  female who presents to the office today for right elbow pain  She states 3 days ago she suffered a fall after slipping on ice landing on her right forearm  She has mild pain at the time of the fall and then tried to drive home and had significant increase in pain in her elbow  She went to the ED the next morning due to increased pain and decreased range of motion  X-rays were completed which showed no acute fracture  She was placed in a sling and instructed to follow up in our office for evaluation  At today's appointment she states her pain is a moderate pain that increases with extension and supination  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  She has not been wearing the sling because she needs the use of her right hand   She has been working from home because she feels like she would not be able to  the steering wheel in the event of an emergency  Her pain worsens with extension, supination, opening jars and doors  She also reports intermittent numbness into her middle finger  She denies any radiating pain at today's appointment  She has been taking Advil daily and icing the elbow  Review of Systems   Constitutional: Positive for activity change  Negative for chills, fever and unexpected weight change  HENT: Negative for hearing loss, nosebleeds and sore throat  Eyes: Negative for pain, redness and visual disturbance  Respiratory: Negative for cough, shortness of breath and wheezing  Cardiovascular: Negative for chest pain, palpitations and leg swelling  Gastrointestinal: Negative for abdominal pain, nausea and vomiting  Endocrine: Negative for polydipsia and polyuria  Genitourinary: Negative for dysuria and hematuria  Musculoskeletal: Positive for arthralgias, joint swelling and myalgias  See HPI   Skin: Negative for rash and wound  Neurological: Positive for numbness  Negative for dizziness and headaches  Psychiatric/Behavioral: Negative for decreased concentration and suicidal ideas  The patient is nervous/anxious  Past Medical History:   Diagnosis Date    Psychiatric disorder        Past Surgical History:   Procedure Laterality Date     SECTION      ENDOMETRIAL ABLATION      HAND SURGERY      OVARIAN CYST DRAINAGE      WRIST SURGERY         History reviewed  No pertinent family history      Social History     Occupational History    Not on file   Tobacco Use    Smoking status: Former Smoker    Smokeless tobacco: Never Used   Substance and Sexual Activity    Alcohol use: Yes     Comment: social    Drug use: No    Sexual activity: Not on file         Current Outpatient Medications:     escitalopram (LEXAPRO) 20 mg tablet, Take 20 mg by mouth 2 (two) times a day, Disp: , Rfl:     traMADol (ULTRAM) 50 mg tablet, Take 1 tablet (50 mg total) by mouth every 6 (six) hours as needed for moderate pain for up to 3 days, Disp: 12 tablet, Rfl: 0    traZODone (DESYREL) 50 mg tablet, , Disp: , Rfl: 0    valACYclovir (VALTREX) 500 mg tablet, , Disp: , Rfl:     Allergies   Allergen Reactions    Duricef [Cefadroxil]     Penicillins        Objective:  Vitals:    12/07/19 0911   BP: 123/85   Pulse: 79       Right Elbow Exam     Tenderness   The patient is experiencing tenderness in the radial head (triceps insertions)  Range of Motion   Extension:  -10 abnormal   Flexion: normal   Pronation: normal   Supination: abnormal     Tests   Tinel's sign (cubital tunnel): negative    Other   Sensation: normal    Comments:  Pinpoint tenderness over radial head  Tenderness over triceps insertions and olecranon  5/5 resisted elbow extension   5/5 biceps - pain                 Physical Exam   Constitutional: She is oriented to person, place, and time  She appears well-developed and well-nourished  HENT:   Head: Normocephalic and atraumatic  Eyes: Pupils are equal, round, and reactive to light  Conjunctivae are normal    Neck: Normal range of motion  Neck supple  Cardiovascular: Normal rate and intact distal pulses  Pulmonary/Chest: Effort normal  No respiratory distress  Musculoskeletal:   As noted in HPI   Neurological: She is alert and oriented to person, place, and time  Skin: Skin is warm and dry  Psychiatric: She has a normal mood and affect  Her behavior is normal    Nursing note and vitals reviewed  I have personally reviewed pertinent films in PACS and my interpretation is as follows: Three view right elbow x-ray taken on 12/4/2019 demonstrates no acute fracture or dislocation

## 2019-12-07 NOTE — LETTER
December 7, 2019     Patient: Paul Johnson   YOB: 1975   Date of Visit: 12/7/2019       To Whom it May Concern:    Paul Johnson is under my professional care  She was seen in my office on 12/7/2019  Please allow Makayla Del Rosario to work from home from 12/4/19 until next week to restrict her long driving  She will follow up in our office on 12/12/2019 for repeat evaluation  If you have any questions or concerns, please don't hesitate to call           Sincerely,          Taylor Laura, DO

## 2019-12-12 ENCOUNTER — APPOINTMENT (OUTPATIENT)
Dept: RADIOLOGY | Facility: CLINIC | Age: 44
End: 2019-12-12
Payer: OTHER MISCELLANEOUS

## 2019-12-12 ENCOUNTER — OFFICE VISIT (OUTPATIENT)
Dept: OBGYN CLINIC | Facility: CLINIC | Age: 44
End: 2019-12-12
Payer: OTHER MISCELLANEOUS

## 2019-12-12 VITALS
BODY MASS INDEX: 37.56 KG/M2 | HEIGHT: 64 IN | WEIGHT: 220 LBS | DIASTOLIC BLOOD PRESSURE: 84 MMHG | SYSTOLIC BLOOD PRESSURE: 125 MMHG | HEART RATE: 79 BPM

## 2019-12-12 DIAGNOSIS — M25.521 RIGHT ELBOW PAIN: Primary | ICD-10-CM

## 2019-12-12 DIAGNOSIS — M25.521 RIGHT ELBOW PAIN: ICD-10-CM

## 2019-12-12 PROCEDURE — 99213 OFFICE O/P EST LOW 20 MIN: CPT | Performed by: ORTHOPAEDIC SURGERY

## 2019-12-12 PROCEDURE — 73080 X-RAY EXAM OF ELBOW: CPT

## 2019-12-12 NOTE — LETTER
December 12, 2019     Patient: Kamryn Alberto   YOB: 1975   Date of Visit: 12/12/2019       To Whom it May Concern:    Kamryn Alberto is under my professional care  She was seen in my office on 12/12/2019  Please allow [facility] to work from home to restrict her long driving  She will follow up in our office after the MRI is completed for repeat evaluation  If you have any questions or concerns, please don't hesitate to call           Sincerely,          Hedda Merlin, DO

## 2019-12-12 NOTE — PROGRESS NOTES
Assessment/Plan:  1  Right elbow pain  XR elbow 3+ vw right    MRI elbow right wo contrast       Scribe Attestation    I,:   Mckayla Rodriguez am acting as a scribe while in the presence of the attending physician :        I,:   Daria Joy, DO personally performed the services described in this documentation    as scribed in my presence :            Marky Alexander has continued right-sided elbow pain  This point I was hoping that she would improve a little bit more than where she is today  I am still concerned with the pinpoint tenderness at her triceps insertion and radial head  I would like to order her an MRI of the right elbow to rule out an occult radial head fracture or partial triceps insertion rupture  She can continue with the gentle range of motion  She will also continue to work from home and was given a new note at today's appointment  She should continue with the naproxen and icing the area as needed  She will follow up once the MRI is completed  Subjective:   Lucrecia Castro is a 37 y o  female who returns to the office today for follow up right elbow pain  She was last seen 1 week ago after suffering a fall where she landed directly on her elbow and forearm  At her last appointment we were concerned for a possible radial head fracture  At today's appointment she reports continued pain in her elbow  She also reports intermittent numbness into her hand specifically in her middle finger  She has been taking the naproxen as prescribed and she states this improves her pain slightly  She does report hearing a cracking in her elbow which caused pain  She has been working from home which she says is helping as it is limiting her driving and she is able to type in a more comfortable position  Review of Systems   Constitutional: Positive for activity change  Negative for chills, fever and unexpected weight change  HENT: Negative for hearing loss, nosebleeds and sore throat      Eyes: Negative for pain, redness and visual disturbance  Respiratory: Negative for cough, shortness of breath and wheezing  Cardiovascular: Negative for chest pain, palpitations and leg swelling  Gastrointestinal: Negative for abdominal pain, nausea and vomiting  Endocrine: Negative for polydipsia and polyuria  Genitourinary: Negative for dysuria and hematuria  Musculoskeletal: Positive for arthralgias and myalgias  See HPI   Skin: Negative for rash and wound  Neurological: Positive for numbness  Negative for dizziness and headaches  Psychiatric/Behavioral: Negative for decreased concentration and suicidal ideas  The patient is not nervous/anxious  Past Medical History:   Diagnosis Date    Psychiatric disorder        Past Surgical History:   Procedure Laterality Date     SECTION      ENDOMETRIAL ABLATION      HAND SURGERY      OVARIAN CYST DRAINAGE      WRIST SURGERY         History reviewed  No pertinent family history  Social History     Occupational History    Not on file   Tobacco Use    Smoking status: Former Smoker    Smokeless tobacco: Never Used   Substance and Sexual Activity    Alcohol use: Yes     Comment: social    Drug use: No    Sexual activity: Not on file         Current Outpatient Medications:     escitalopram (LEXAPRO) 20 mg tablet, Take 20 mg by mouth 2 (two) times a day, Disp: , Rfl:     naproxen (NAPROSYN) 500 mg tablet, Take 1 tablet (500 mg total) by mouth 2 (two) times a day with meals, Disp: 60 tablet, Rfl: 0    traZODone (DESYREL) 50 mg tablet, , Disp: , Rfl: 0    valACYclovir (VALTREX) 500 mg tablet, , Disp: , Rfl:     Allergies   Allergen Reactions    Duricef [Cefadroxil]     Penicillins        Objective:  Vitals:    19 0932   BP: 125/84   Pulse: 79       Right Elbow Exam     Tenderness   The patient is experiencing tenderness in the radial head       Range of Motion   Extension:  -10 (Pain) abnormal   Flexion:  110 (Pain) abnormal   Pronation: abnormal   Supination: abnormal     Tests   Varus: negative  Valgus: negative  Tinel's sign (cubital tunnel): negative    Other   Sensation: normal    Comments:  Pinpoint tenderness over triceps insertion  Pain in the posterior elbow with varus and valgus stress test with no laxity    3/5 extension  3/5 flexion   4/5  strength       Left Elbow Exam   Left elbow exam is normal             Physical Exam   Constitutional: She is oriented to person, place, and time  She appears well-developed and well-nourished  HENT:   Head: Normocephalic and atraumatic  Eyes: Pupils are equal, round, and reactive to light  Conjunctivae are normal    Neck: Normal range of motion  Neck supple  Cardiovascular: Normal rate and intact distal pulses  Pulmonary/Chest: Effort normal  No respiratory distress  Musculoskeletal:   As noted in HPI   Neurological: She is alert and oriented to person, place, and time  Skin: Skin is warm and dry  Psychiatric: She has a normal mood and affect  Her behavior is normal    Nursing note and vitals reviewed  I have personally reviewed pertinent films in PACS and my interpretation is as follows: Three-view right elbow x-ray taken on December 12, 2019 demonstrates no acute fracture dislocation

## 2019-12-18 ENCOUNTER — TELEPHONE (OUTPATIENT)
Dept: OBGYN CLINIC | Facility: HOSPITAL | Age: 44
End: 2019-12-18

## 2019-12-18 NOTE — TELEPHONE ENCOUNTER
Braulio, [address] [place] [id number] [facility]    Braulio is asking that we fax patient's mri script to [phone], script is faxed

## 2020-01-02 ENCOUNTER — OFFICE VISIT (OUTPATIENT)
Dept: OBGYN CLINIC | Facility: CLINIC | Age: 45
End: 2020-01-02
Payer: OTHER MISCELLANEOUS

## 2020-01-02 VITALS
HEART RATE: 116 BPM | WEIGHT: 220 LBS | HEIGHT: 64 IN | SYSTOLIC BLOOD PRESSURE: 145 MMHG | DIASTOLIC BLOOD PRESSURE: 86 MMHG | BODY MASS INDEX: 37.56 KG/M2

## 2020-01-02 DIAGNOSIS — S50.11XD CONTUSION OF RIGHT ELBOW AND FOREARM, SUBSEQUENT ENCOUNTER: ICD-10-CM

## 2020-01-02 DIAGNOSIS — S53.441D SPRAIN OF ULNAR COLLATERAL LIGAMENT OF RIGHT ELBOW, SUBSEQUENT ENCOUNTER: Primary | ICD-10-CM

## 2020-01-02 PROCEDURE — 99213 OFFICE O/P EST LOW 20 MIN: CPT | Performed by: ORTHOPAEDIC SURGERY

## 2020-01-02 NOTE — PROGRESS NOTES
Assessment/Plan:  1  Sprain of ulnar collateral ligament of right elbow, subsequent encounter     2  Contusion of right elbow and forearm, subsequent encounter       Jenna Talbot appears to have a UCL sprain and a contusion to the proximal ulna on her MRI  Both of these injuries should improve with conservative measures  I discussed having her do formal physical therapy however she states that it would be difficult for her to do with work  She would like to return to work  I do think it is reasonable for her to do home exercises which I printed out for her today  She should make a full recovery in the next few weeks  If she is having continued discomfort we could start her in formal occupational therapy at that time  She will follow up with me as needed  She verbalized understanding of this and can return to work at this time  Subjective:   Paula Ferrer is a 53 y o  female who presents for follow-up for right elbow injury which occurred 1 month ago  This part of a worker's compensation injury after a slip and fall at work  There was continued pain over her right elbow and concern for possible fracture so she was sent for MRI of the right elbow  She returns for those results today  She states that the elbow is been feeling better and has less discomfort on a daily basis  She still has aching throbbing pain deep within the elbow and worsening pain with full extension  She states her range of motion is back to normal   She does get intermittent numbness into her right hand but this does seem to be improving as well  Review of Systems   Constitutional: Positive for activity change  Negative for chills, fever and unexpected weight change  HENT: Negative for hearing loss, nosebleeds and sore throat  Eyes: Negative for pain, redness and visual disturbance  Respiratory: Negative for cough, shortness of breath and wheezing  Cardiovascular: Negative for chest pain, palpitations and leg swelling  Gastrointestinal: Negative for abdominal pain, nausea and vomiting  Endocrine: Negative for polydipsia and polyuria  Genitourinary: Negative for dysuria and hematuria  Musculoskeletal:        See HPI   Skin: Negative for rash and wound  Neurological: Negative for dizziness, numbness and headaches  Psychiatric/Behavioral: Negative for decreased concentration and suicidal ideas  The patient is not nervous/anxious  Past Medical History:   Diagnosis Date    Psychiatric disorder        Past Surgical History:   Procedure Laterality Date     SECTION      ENDOMETRIAL ABLATION      HAND SURGERY      OVARIAN CYST DRAINAGE      WRIST SURGERY         History reviewed  No pertinent family history  Social History     Occupational History    Not on file   Tobacco Use    Smoking status: Former Smoker    Smokeless tobacco: Never Used   Substance and Sexual Activity    Alcohol use: Yes     Comment: social    Drug use: No    Sexual activity: Not on file         Current Outpatient Medications:     escitalopram (LEXAPRO) 20 mg tablet, Take 20 mg by mouth 2 (two) times a day, Disp: , Rfl:     naproxen (NAPROSYN) 500 mg tablet, Take 1 tablet (500 mg total) by mouth 2 (two) times a day with meals, Disp: 60 tablet, Rfl: 0    traZODone (DESYREL) 50 mg tablet, , Disp: , Rfl: 0    valACYclovir (VALTREX) 500 mg tablet, , Disp: , Rfl:     Allergies   Allergen Reactions    Duricef [Cefadroxil]     Penicillins        Objective:  Vitals:    20 1027   BP: 145/86   Pulse: (!) 116       Right Elbow Exam     Tenderness   The patient is experiencing tenderness in the medial epicondyle and lateral epicondyle (tricelps, UCL)       Range of Motion   Extension: normal   Flexion: normal   Pronation: normal   Supination: normal     Muscle Strength   Pronation:  4/5   Supination:  5/5     Tests   Varus: negative  Valgus: positive    Other   Erythema: absent  Sensation: normal  Pulse: present            Physical Exam   Constitutional: She is oriented to person, place, and time  She appears well-developed and well-nourished  HENT:   Head: Normocephalic and atraumatic  Eyes: Pupils are equal, round, and reactive to light  Conjunctivae are normal    Neck: Normal range of motion  Neck supple  Cardiovascular: Normal rate and intact distal pulses  Pulmonary/Chest: Effort normal  No respiratory distress  Musculoskeletal:   As noted in HPI   Neurological: She is alert and oriented to person, place, and time  Skin: Skin is warm and dry  Psychiatric: She has a normal mood and affect  Her behavior is normal    Nursing note and vitals reviewed  I have personally reviewed pertinent films in PACS and my interpretation is as follows:  MRI of the right elbow demonstrates a UCL sprain and contusion of the ulna  No significant fracture  Tendinopathy at the flexor and extensor tendon insertion

## 2020-01-02 NOTE — LETTER
January 2, 2020     Patient: Sherry Felix   YOB: 1975   Date of Visit: 1/2/2020       To Whom it May Concern:    Garcia Forbes is under my professional care  She was seen in my office on 1/2/2020  Cleared for work at this time as tolerated  Please allow for intermittent breaks for typing if needed at work  If you have any questions or concerns, please don't hesitate to call           Sincerely,          Qian Cerda, DO

## 2020-08-03 LAB
EXTERNAL CHLAMYDIA RESULT: NEGATIVE
EXTERNAL HIV SCREEN: NORMAL
EXTERNAL SARS COV-2 ANTIBODY IGG: NEGATIVE
N GONORRHOEA RRNA SPEC QL PROBE: NEGATIVE

## 2020-12-05 ENCOUNTER — OFFICE VISIT (OUTPATIENT)
Dept: URGENT CARE | Age: 45
End: 2020-12-05
Payer: COMMERCIAL

## 2020-12-05 VITALS
HEART RATE: 98 BPM | WEIGHT: 240 LBS | TEMPERATURE: 97.7 F | OXYGEN SATURATION: 98 % | BODY MASS INDEX: 40.97 KG/M2 | HEIGHT: 64 IN | RESPIRATION RATE: 20 BRPM

## 2020-12-05 DIAGNOSIS — Z20.822 EXPOSURE TO COVID-19 VIRUS: Primary | ICD-10-CM

## 2020-12-05 PROCEDURE — U0003 INFECTIOUS AGENT DETECTION BY NUCLEIC ACID (DNA OR RNA); SEVERE ACUTE RESPIRATORY SYNDROME CORONAVIRUS 2 (SARS-COV-2) (CORONAVIRUS DISEASE [COVID-19]), AMPLIFIED PROBE TECHNIQUE, MAKING USE OF HIGH THROUGHPUT TECHNOLOGIES AS DESCRIBED BY CMS-2020-01-R: HCPCS | Performed by: NURSE PRACTITIONER

## 2020-12-05 PROCEDURE — 99213 OFFICE O/P EST LOW 20 MIN: CPT | Performed by: NURSE PRACTITIONER

## 2020-12-06 ENCOUNTER — TELEPHONE (OUTPATIENT)
Dept: URGENT CARE | Age: 45
End: 2020-12-06

## 2020-12-06 LAB — SARS-COV-2 RNA SPEC QL NAA+PROBE: DETECTED

## 2021-01-27 ENCOUNTER — DOCUMENTATION (OUTPATIENT)
Dept: PULMONOLOGY | Facility: CLINIC | Age: 46
End: 2021-01-27

## 2021-01-27 ENCOUNTER — OFFICE VISIT (OUTPATIENT)
Dept: PULMONOLOGY | Facility: CLINIC | Age: 46
End: 2021-01-27
Payer: COMMERCIAL

## 2021-01-27 VITALS
SYSTOLIC BLOOD PRESSURE: 128 MMHG | OXYGEN SATURATION: 97 % | BODY MASS INDEX: 47.84 KG/M2 | RESPIRATION RATE: 18 BRPM | TEMPERATURE: 96.6 F | HEART RATE: 118 BPM | DIASTOLIC BLOOD PRESSURE: 82 MMHG | HEIGHT: 64 IN | WEIGHT: 280.2 LBS

## 2021-01-27 DIAGNOSIS — U07.1 COVID-19: Primary | ICD-10-CM

## 2021-01-27 DIAGNOSIS — R00.0 TACHYCARDIA: ICD-10-CM

## 2021-01-27 DIAGNOSIS — R06.02 SHORTNESS OF BREATH: ICD-10-CM

## 2021-01-27 DIAGNOSIS — Z87.891 HISTORY OF TOBACCO ABUSE: ICD-10-CM

## 2021-01-27 PROCEDURE — 99204 OFFICE O/P NEW MOD 45 MIN: CPT | Performed by: INTERNAL MEDICINE

## 2021-01-27 RX ORDER — PREDNISONE 10 MG/1
TABLET ORAL
COMMUNITY
Start: 2021-01-09 | End: 2021-01-27 | Stop reason: ALTCHOICE

## 2021-01-27 RX ORDER — DOXYCYCLINE HYCLATE 100 MG
100 TABLET ORAL 2 TIMES DAILY
COMMUNITY
Start: 2021-01-01 | End: 2021-01-27 | Stop reason: ALTCHOICE

## 2021-01-27 RX ORDER — ALBUTEROL SULFATE 2.5 MG/3ML
2.5 SOLUTION RESPIRATORY (INHALATION) 3 TIMES DAILY
COMMUNITY
Start: 2021-01-21 | End: 2021-01-29 | Stop reason: SDUPTHER

## 2021-01-27 RX ORDER — BENZONATATE 200 MG/1
CAPSULE ORAL
COMMUNITY
Start: 2021-01-19 | End: 2021-02-24 | Stop reason: ALTCHOICE

## 2021-01-27 RX ORDER — VALACYCLOVIR HYDROCHLORIDE 1 G/1
1000 TABLET, FILM COATED ORAL EVERY 8 HOURS
COMMUNITY
Start: 2021-01-21 | End: 2021-01-27 | Stop reason: ALTCHOICE

## 2021-01-27 RX ORDER — DEXAMETHASONE 2 MG/1
2 TABLET ORAL 2 TIMES DAILY
COMMUNITY
Start: 2021-01-01 | End: 2021-01-27 | Stop reason: ALTCHOICE

## 2021-01-27 RX ORDER — HYDROCODONE POLISTIREX AND CHLORPHENIRAMINE POLISTIREX 10; 8 MG/5ML; MG/5ML
SUSPENSION, EXTENDED RELEASE ORAL
COMMUNITY
Start: 2021-01-20 | End: 2021-01-27 | Stop reason: ALTCHOICE

## 2021-01-27 RX ORDER — PHENTERMINE AND TOPIRAMATE 15; 92 MG/1; MG/1
CAPSULE, EXTENDED RELEASE ORAL
COMMUNITY
Start: 2021-01-13 | End: 2021-02-25 | Stop reason: SDUPTHER

## 2021-01-27 RX ORDER — MOMETASONE FUROATE 200 UG/1
AEROSOL RESPIRATORY (INHALATION)
COMMUNITY
Start: 2021-01-20 | End: 2021-01-29 | Stop reason: SDUPTHER

## 2021-01-27 RX ORDER — METHYLPREDNISOLONE 4 MG/1
TABLET ORAL
COMMUNITY
Start: 2021-01-19 | End: 2021-01-27 | Stop reason: ALTCHOICE

## 2021-01-27 NOTE — PROGRESS NOTES
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  Plan:  - Check CBC, BMP and TSH  - CTA  - Echo  - Will obtain PFTs but once the remainder of the work up has been complete and she is back to baseline  3  Tachycardia- unclear etiology  Regular rhythm  PE vs cardiac etiology vs deconditioning  Plan:  - Check cardiac echo    4  History of tobacco abuse- smoked 1 PPD for 10 years and quit in 2007      Plan:    Diagnoses and all orders for this visit:    COVID-19  -     Basic metabolic panel; Future  -     CBC and differential; Future  -     TSH, 3rd generation with Free T4 reflex; Future  -     CTA chest pe study; Future  -     Echo complete with contrast if indicated; Future    Shortness of breath  -     Basic metabolic panel; Future  -     CBC and differential; Future  -     TSH, 3rd generation with Free T4 reflex; Future  -     CTA chest pe study; Future  -     Echo complete with contrast if indicated; Future    Tachycardia    History of tobacco abuse    Other orders  -     albuterol (2 5 mg/3 mL) 0 083 % nebulizer solution; Take 2 5 mg by nebulization 3 (three) times a day  -     benzonatate (TESSALON) 200 MG capsule; TAKE 1 CAPSULE BY MOUTH 2 TIMES EVERY DAY AS NEEDED FOR COUGH  -     Discontinue: dexamethasone (DECADRON) 2 mg tablet; Take 2 mg by mouth 2 (two) times a day  -     Discontinue: doxycycline hyclate (VIBRA-TABS) 100 mg tablet;  Take 100 mg by mouth 2 (two) times a day  -     Discontinue: hydrocodone-chlorpheniramine polistirex (TUSSIONEX) 10-8 mg/5 mL ER suspension; TAKE 5 ML BY MOUTH EVERY 12 HOURS  -     Discontinue: methylPREDNISolone 4 MG tablet therapy pack; TAKE 6 TABLETS ON DAY 1 AS DIRECTED ON PACKAGE AND DECREASE BY 1 TAB EACH DAY FOR A TOTAL OF 6 DAYS  -     Asmanex  MCG/ACT AERO; TAKE 2 PUFFS BY MOUTH TWICE A DAY IN THE MORNING AND IN THE EVENING  -     Qsymia 15-92 MG CP24; TAKE 1 CAPSULE BY MOUTH EVERY DAY IN THE MORNING  -     Discontinue: predniSONE 10 mg tablet; TAKE 5 TABLETS BY MOUTH DAILY FOR 2 DAYS THEN DECREASE BY 1 TABLET EVERY 2 DAYS FOR 10 DAYS  -     Discontinue: valACYclovir (VALTREX) 1,000 mg tablet; Take 1,000 mg by mouth every 8 (eight) hours        History of Present Illness   HPI:  Abigail Landers is a 50 y o  female who reports 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 that presents for evaluation of SOB  The patients symptoms began 12/2 in addition to anosmia and dysguesia, fever, diarrhea  She tested positive for COVID on 12/6  Her symptoms continued and on 01/01 she had a telehealth visit with Doctor [facility] and was prescribed a zpack and 5 days of decadron along with albuterol inhaler  Her symptoms persisted and on 01/09 she was started on prednisone with a taper  Her symptoms again persisted and on 01/19 she was initiated on a medrol dose pack and started on asmanex  She denies significant past medical history  She does mention wheezing with her symptoms and ongoing SOB and a cough of productive thick sputum  She is also having bilateral leg pain with some swelling  Patient smoked 1 PPD for 10 years and quit in 2007, denies drugs or alcohol  Lives with significant other, has pet dog and two cats, denies allergies  She works from home with [employer]  Review of Systems   Constitutional: Negative for chills and fever  HENT: Negative for congestion, postnasal drip and rhinorrhea  Eyes: Negative for itching  Respiratory: Positive for cough and shortness of breath  Negative for wheezing and stridor  Cardiovascular: Negative for chest pain, palpitations and leg swelling  Gastrointestinal: Negative for abdominal distention, abdominal pain, nausea and vomiting  Genitourinary: Negative for dysuria and flank pain  Musculoskeletal: Negative for arthralgias and myalgias  Skin: Negative for color change  Neurological: Negative for dizziness, light-headedness and headaches  Psychiatric/Behavioral: Negative  Historical Information   Past Medical History:   Diagnosis Date    Psychiatric disorder      Past Surgical History:   Procedure Laterality Date     SECTION      ENDOMETRIAL ABLATION      HAND SURGERY      OVARIAN CYST DRAINAGE      WRIST SURGERY       Family history: Both parents were smokers and dad passed from adenocarcinoma and mom from small cell cancer at the age of 52 and 48 respectively  Occupational History: works for [employer] but works from home    Social History: smoked 1 PPD for 10 years and quit in   No drugs or alcohol  Lives with boyfriend and 2 children ages 3 and 25  Has two cats and a dog   Does not report being allergic    Meds/Allergies     Current Outpatient Medications:     albuterol (2 5 mg/3 mL) 0 083 % nebulizer solution, Take 2 5 mg by nebulization 3 (three) times a day, Disp: , Rfl:     Asmanex  MCG/ACT AERO, TAKE 2 PUFFS BY MOUTH TWICE A DAY IN THE MORNING AND IN THE EVENING, Disp: , Rfl:     benzonatate (TESSALON) 200 MG capsule, TAKE 1 CAPSULE BY MOUTH 2 TIMES EVERY DAY AS NEEDED FOR COUGH, Disp: , Rfl:     escitalopram (LEXAPRO) 20 mg tablet, Take 20 mg by mouth 2 (two) times a day, Disp: , Rfl:     Qsymia 15-92 MG CP24, TAKE 1 CAPSULE BY MOUTH EVERY DAY IN THE MORNING, Disp: , Rfl:     valACYclovir (VALTREX) 500 mg tablet, , Disp: , Rfl:     naproxen (NAPROSYN) 500 mg tablet, Take 1 tablet (500 mg total) by mouth 2 (two) times a day with meals (Patient not taking: Reported on 2021), Disp: 60 tablet, Rfl: 0   traZODone (DESYREL) 50 mg tablet, , Disp: , Rfl: 0  Allergies   Allergen Reactions    Duricef [Cefadroxil]     Penicillins        Vitals: Blood pressure 128/82, pulse (!) 118, temperature (!) 96 6 °F (35 9 °C), temperature source Tympanic, resp  rate 18, height 5' 4" (1 626 m), weight 127 kg (280 lb 3 2 oz), SpO2 97 %  , Body mass index is 48 1 kg/m²  Oxygen Therapy  SpO2: 97 %    Physical Exam  Physical Exam  Constitutional:       General: She is not in acute distress  Appearance: She is not diaphoretic  HENT:      Head: Normocephalic and atraumatic  Nose: Nose normal       Mouth/Throat:      Pharynx: No oropharyngeal exudate  Eyes:      General: No scleral icterus  Conjunctiva/sclera: Conjunctivae normal       Pupils: Pupils are equal, round, and reactive to light  Neck:      Musculoskeletal: Normal range of motion and neck supple  Thyroid: No thyromegaly  Vascular: No JVD  Trachea: No tracheal deviation  Cardiovascular:      Rate and Rhythm: Normal rate and regular rhythm  Heart sounds: Normal heart sounds  No murmur  No friction rub  No gallop  Pulmonary:      Effort: Pulmonary effort is normal  No respiratory distress  Breath sounds: Normal breath sounds  No stridor  No wheezing or rales  Abdominal:      General: Bowel sounds are normal  There is no distension  Palpations: Abdomen is soft  Tenderness: There is no abdominal tenderness  There is no guarding or rebound  Musculoskeletal: Normal range of motion  General: No deformity  Lymphadenopathy:      Cervical: No cervical adenopathy  Skin:     General: Skin is warm  Findings: No erythema or rash  Neurological:      Mental Status: She is alert and oriented to person, place, and time  Cranial Nerves: No cranial nerve deficit  Sensory: No sensory deficit  Labs: I have personally reviewed pertinent lab results    No results found for: WBC, HGB, HCT, MCV, PLT  No results found for: GLUCOSE, CALCIUM, NA, K, CO2, CL, BUN, CREATININE  No results found for: IGE  No results found for: ALT, AST, GGT, ALKPHOS, BILITOT      Imaging and other studies: I have personally reviewed pertinent reports  and I have personally reviewed pertinent films in PACS  CXR- faint bilateral upper lobe opacities    Pulmonary function testing: none on file    EKG, Pathology, and Other Studies: I have personally reviewed pertinent reports     and I have personally reviewed pertinent films in PACS      Roxanna Freeman MD  Pulmonary and Critical Care Fellow- PGY 5  [facility]

## 2021-01-28 ENCOUNTER — LAB (OUTPATIENT)
Dept: LAB | Facility: HOSPITAL | Age: 46
End: 2021-01-28
Attending: INTERNAL MEDICINE
Payer: COMMERCIAL

## 2021-01-28 ENCOUNTER — HOSPITAL ENCOUNTER (OUTPATIENT)
Dept: RADIOLOGY | Facility: HOSPITAL | Age: 46
Discharge: HOME | End: 2021-01-28
Attending: INTERNAL MEDICINE
Payer: COMMERCIAL

## 2021-01-28 ENCOUNTER — TELEPHONE (OUTPATIENT)
Dept: PULMONOLOGY | Facility: CLINIC | Age: 46
End: 2021-01-28

## 2021-01-28 DIAGNOSIS — U07.1 COVID-19: ICD-10-CM

## 2021-01-28 DIAGNOSIS — R06.02 SHORTNESS OF BREATH: ICD-10-CM

## 2021-01-28 LAB
ANION GAP SERPL CALCULATED.3IONS-SCNC: 6 MMOL/L (ref 4–13)
BASOPHILS # BLD AUTO: 0.08 THOUSANDS/ΜL (ref 0–0.1)
BASOPHILS NFR BLD AUTO: 1 % (ref 0–1)
BUN SERPL-MCNC: 19 MG/DL (ref 5–25)
CALCIUM SERPL-MCNC: 9.3 MG/DL (ref 8.3–10.1)
CHLORIDE SERPL-SCNC: 109 MMOL/L (ref 100–108)
CO2 SERPL-SCNC: 26 MMOL/L (ref 21–32)
CREAT SERPL-MCNC: 0.76 MG/DL (ref 0.6–1.3)
EOSINOPHIL # BLD AUTO: 0.21 THOUSAND/ΜL (ref 0–0.61)
EOSINOPHIL NFR BLD AUTO: 2 % (ref 0–6)
ERYTHROCYTE [DISTWIDTH] IN BLOOD BY AUTOMATED COUNT: 13.2 % (ref 11.6–15.1)
GFR SERPL CREATININE-BSD FRML MDRD: 94 ML/MIN/1.73SQ M
GLUCOSE P FAST SERPL-MCNC: 124 MG/DL (ref 65–99)
HCT VFR BLD AUTO: 45.7 % (ref 34.8–46.1)
HGB BLD-MCNC: 15.1 G/DL (ref 11.5–15.4)
IMM GRANULOCYTES # BLD AUTO: 0.09 THOUSAND/UL (ref 0–0.2)
IMM GRANULOCYTES NFR BLD AUTO: 1 % (ref 0–2)
LYMPHOCYTES # BLD AUTO: 3.02 THOUSANDS/ΜL (ref 0.6–4.47)
LYMPHOCYTES NFR BLD AUTO: 24 % (ref 14–44)
MCH RBC QN AUTO: 30.8 PG (ref 26.8–34.3)
MCHC RBC AUTO-ENTMCNC: 33 G/DL (ref 31.4–37.4)
MCV RBC AUTO: 93 FL (ref 82–98)
MONOCYTES # BLD AUTO: 0.77 THOUSAND/ΜL (ref 0.17–1.22)
MONOCYTES NFR BLD AUTO: 6 % (ref 4–12)
NEUTROPHILS # BLD AUTO: 8.25 THOUSANDS/ΜL (ref 1.85–7.62)
NEUTS SEG NFR BLD AUTO: 66 % (ref 43–75)
NRBC BLD AUTO-RTO: 0 /100 WBCS
PLATELET # BLD AUTO: 297 THOUSANDS/UL (ref 149–390)
PMV BLD AUTO: 9.9 FL (ref 8.9–12.7)
POTASSIUM SERPL-SCNC: 3.8 MMOL/L (ref 3.5–5.3)
RBC # BLD AUTO: 4.9 MILLION/UL (ref 3.81–5.12)
SODIUM SERPL-SCNC: 141 MMOL/L (ref 136–145)
TSH SERPL DL<=0.05 MIU/L-ACNC: 1.63 UIU/ML (ref 0.36–3.74)
WBC # BLD AUTO: 12.42 THOUSAND/UL (ref 4.31–10.16)

## 2021-01-28 PROCEDURE — 71275 CT ANGIOGRAPHY CHEST: CPT

## 2021-01-28 PROCEDURE — 80048 BASIC METABOLIC PNL TOTAL CA: CPT

## 2021-01-28 PROCEDURE — 36415 COLL VENOUS BLD VENIPUNCTURE: CPT

## 2021-01-28 PROCEDURE — 85025 COMPLETE CBC W/AUTO DIFF WBC: CPT

## 2021-01-28 PROCEDURE — G1004 CDSM NDSC: HCPCS

## 2021-01-28 PROCEDURE — 84443 ASSAY THYROID STIM HORMONE: CPT

## 2021-01-28 RX ADMIN — IOHEXOL 85 ML: 350 INJECTION, SOLUTION INTRAVENOUS at 08:40

## 2021-01-29 ENCOUNTER — TELEPHONE (OUTPATIENT)
Dept: PULMONOLOGY | Facility: CLINIC | Age: 46
End: 2021-01-29

## 2021-01-29 ENCOUNTER — DOCUMENTATION (OUTPATIENT)
Dept: PULMONOLOGY | Facility: CLINIC | Age: 46
End: 2021-01-29

## 2021-01-29 DIAGNOSIS — U07.1 COVID-19: Primary | ICD-10-CM

## 2021-01-29 RX ORDER — ALBUTEROL SULFATE 90 UG/1
2 AEROSOL, METERED RESPIRATORY (INHALATION) EVERY 6 HOURS PRN
Qty: 18 G | Refills: 5 | Status: SHIPPED | OUTPATIENT
Start: 2021-01-29 | End: 2021-09-01

## 2021-01-29 RX ORDER — ALBUTEROL SULFATE 2.5 MG/3ML
2.5 SOLUTION RESPIRATORY (INHALATION) 3 TIMES DAILY
Qty: 270 ML | Refills: 5 | Status: SHIPPED | OUTPATIENT
Start: 2021-01-29 | End: 2021-09-01

## 2021-01-29 RX ORDER — MOMETASONE FUROATE 200 UG/1
2 AEROSOL RESPIRATORY (INHALATION) 2 TIMES DAILY
Qty: 1 INHALER | Refills: 5 | Status: SHIPPED | OUTPATIENT
Start: 2021-01-29 | End: 2021-02-24 | Stop reason: SDUPTHER

## 2021-01-29 NOTE — TELEPHONE ENCOUNTER
Patient calling stating central scheduling told her to notify the office regarding ECHO appointment being moved to 2/2 @ 9am location  [address]

## 2021-01-30 DIAGNOSIS — R06.02 SHORTNESS OF BREATH: Primary | ICD-10-CM

## 2021-01-30 DIAGNOSIS — M79.605 PAIN OF LEFT LOWER EXTREMITY: ICD-10-CM

## 2021-01-30 DIAGNOSIS — R93.89 ABNORMAL CT OF THE CHEST: ICD-10-CM

## 2021-01-30 DIAGNOSIS — K21.9 GASTROESOPHAGEAL REFLUX DISEASE WITHOUT ESOPHAGITIS: ICD-10-CM

## 2021-01-30 RX ORDER — FAMOTIDINE 40 MG/1
20 TABLET, FILM COATED ORAL DAILY
Qty: 30 TABLET | Refills: 0 | Status: SHIPPED | OUTPATIENT
Start: 2021-01-30 | End: 2021-02-25 | Stop reason: SDUPTHER

## 2021-01-30 NOTE — PROGRESS NOTES
Called patient and discussed CT chest results  Some bibasilar atelectasis and mediastinal lymphadenopathy presumed to be related to a recent viral infection  She would like to ensure resolution of these findings in order to avoid missing an alternate diagnosis  This is reasonable and therefore will repeat a CT chest w/o contrast in 6-8 weeks  She is also reporting persistent left leg pain since being diagnosed with COVID  Will check a ddimer and if positive will check lower extremity dopplers  She will undergo her cardiac echo this week and I explained to her that if it is normal than we will proceed with PFTs  She also reports having significant symptoms of GERD  Will send a script for famotidine  She is agreeable to the plan   All questions answered and she verbalized understanding

## 2021-02-06 ENCOUNTER — HOSPITAL ENCOUNTER (OUTPATIENT)
Dept: NON INVASIVE DIAGNOSTICS | Facility: HOSPITAL | Age: 46
Discharge: HOME | End: 2021-02-06
Attending: INTERNAL MEDICINE
Payer: COMMERCIAL

## 2021-02-06 ENCOUNTER — LAB (OUTPATIENT)
Dept: LAB | Facility: HOSPITAL | Age: 46
End: 2021-02-06
Attending: INTERNAL MEDICINE
Payer: COMMERCIAL

## 2021-02-06 DIAGNOSIS — R06.02 SHORTNESS OF BREATH: ICD-10-CM

## 2021-02-06 DIAGNOSIS — U07.1 COVID-19: ICD-10-CM

## 2021-02-06 DIAGNOSIS — M79.605 PAIN OF LEFT LOWER EXTREMITY: ICD-10-CM

## 2021-02-06 LAB — D DIMER PPP FEU-MCNC: 1.05 UG/ML FEU

## 2021-02-06 PROCEDURE — 36415 COLL VENOUS BLD VENIPUNCTURE: CPT

## 2021-02-06 PROCEDURE — 93306 TTE W/DOPPLER COMPLETE: CPT | Performed by: INTERNAL MEDICINE

## 2021-02-06 PROCEDURE — 85379 FIBRIN DEGRADATION QUANT: CPT

## 2021-02-06 PROCEDURE — 93306 TTE W/DOPPLER COMPLETE: CPT

## 2021-02-08 ENCOUNTER — TELEPHONE (OUTPATIENT)
Dept: PULMONOLOGY | Facility: CLINIC | Age: 46
End: 2021-02-08

## 2021-02-09 DIAGNOSIS — R06.02 SHORTNESS OF BREATH: ICD-10-CM

## 2021-02-09 DIAGNOSIS — M79.605 LEG PAIN, BILATERAL: ICD-10-CM

## 2021-02-09 DIAGNOSIS — M79.604 LEG PAIN, BILATERAL: ICD-10-CM

## 2021-02-09 DIAGNOSIS — R79.89 ELEVATED D-DIMER: Primary | ICD-10-CM

## 2021-02-09 NOTE — PROGRESS NOTES
Discussed results with patient regarding Echo and Ddimer  The echo is overall normal however the ddimer is positive  She is continuing have bilateral lower extremity pain and therefore we will send her for lower extremity ultrasound to assess ddimer  In addition, she is still experiencing SOB  We will also send her for pulmonary function testing

## 2021-02-15 ENCOUNTER — HOSPITAL ENCOUNTER (OUTPATIENT)
Dept: VASCULAR ULTRASOUND | Facility: HOSPITAL | Age: 46
Discharge: HOME | End: 2021-02-15
Attending: INTERNAL MEDICINE
Payer: COMMERCIAL

## 2021-02-15 DIAGNOSIS — M79.604 LEG PAIN, BILATERAL: ICD-10-CM

## 2021-02-15 DIAGNOSIS — R79.89 ELEVATED D-DIMER: ICD-10-CM

## 2021-02-15 DIAGNOSIS — M79.605 LEG PAIN, BILATERAL: ICD-10-CM

## 2021-02-15 PROCEDURE — 93970 EXTREMITY STUDY: CPT | Performed by: SURGERY

## 2021-02-15 PROCEDURE — 93970 EXTREMITY STUDY: CPT

## 2021-02-23 ENCOUNTER — HOSPITAL ENCOUNTER (OUTPATIENT)
Dept: PULMONOLOGY | Facility: HOSPITAL | Age: 46
Discharge: HOME | End: 2021-02-23
Attending: INTERNAL MEDICINE
Payer: COMMERCIAL

## 2021-02-23 DIAGNOSIS — R06.02 SHORTNESS OF BREATH: ICD-10-CM

## 2021-02-23 PROCEDURE — 94726 PLETHYSMOGRAPHY LUNG VOLUMES: CPT | Performed by: INTERNAL MEDICINE

## 2021-02-23 PROCEDURE — 94060 EVALUATION OF WHEEZING: CPT | Performed by: INTERNAL MEDICINE

## 2021-02-23 PROCEDURE — 94729 DIFFUSING CAPACITY: CPT

## 2021-02-23 PROCEDURE — 94760 N-INVAS EAR/PLS OXIMETRY 1: CPT

## 2021-02-23 PROCEDURE — 94729 DIFFUSING CAPACITY: CPT | Performed by: INTERNAL MEDICINE

## 2021-02-23 PROCEDURE — 94060 EVALUATION OF WHEEZING: CPT

## 2021-02-23 PROCEDURE — 94726 PLETHYSMOGRAPHY LUNG VOLUMES: CPT

## 2021-02-24 ENCOUNTER — TELEPHONE (OUTPATIENT)
Dept: PULMONOLOGY | Facility: CLINIC | Age: 46
End: 2021-02-24

## 2021-02-24 ENCOUNTER — OFFICE VISIT (OUTPATIENT)
Dept: PULMONOLOGY | Facility: CLINIC | Age: 46
End: 2021-02-24
Payer: COMMERCIAL

## 2021-02-24 VITALS
SYSTOLIC BLOOD PRESSURE: 112 MMHG | DIASTOLIC BLOOD PRESSURE: 80 MMHG | OXYGEN SATURATION: 99 % | BODY MASS INDEX: 46.65 KG/M2 | HEART RATE: 94 BPM | TEMPERATURE: 97.7 F | WEIGHT: 280 LBS | HEIGHT: 65 IN

## 2021-02-24 DIAGNOSIS — U07.1 COVID-19: ICD-10-CM

## 2021-02-24 DIAGNOSIS — R59.0 MEDIASTINAL LYMPHADENOPATHY: ICD-10-CM

## 2021-02-24 DIAGNOSIS — Z87.891 HISTORY OF TOBACCO ABUSE: ICD-10-CM

## 2021-02-24 DIAGNOSIS — K21.9 GASTROESOPHAGEAL REFLUX DISEASE WITHOUT ESOPHAGITIS: ICD-10-CM

## 2021-02-24 DIAGNOSIS — R06.02 SHORTNESS OF BREATH: Primary | ICD-10-CM

## 2021-02-24 PROCEDURE — 99213 OFFICE O/P EST LOW 20 MIN: CPT | Performed by: INTERNAL MEDICINE

## 2021-02-24 RX ORDER — MOMETASONE FUROATE 200 UG/1
2 AEROSOL RESPIRATORY (INHALATION) 2 TIMES DAILY
Qty: 1 INHALER | Refills: 5 | Status: SHIPPED | OUTPATIENT
Start: 2021-02-24 | End: 2021-09-01

## 2021-02-24 NOTE — PROGRESS NOTES
Pulmonary Follow Up Note   Chyna Qureshi 50 y o  female MRN: [id number]  2/24/2021      Assessment:    1  Shortness of breath- may be related to post COVID, bibasilar atelectasis versus some ground-glass opacification  Her cardiac echo was normal   Pulmonary function testing showed mild air trapping that appears to be improved with the use of Asmanex and albuterol nebs  We did also discuss that she has gained 60 lb in the little over a year and that this is likely contributing significantly to her shortness of breath  Plan:  · Continue Asmanex b i d , sent a refill to her pharmacy  · Switch albuterol nebs to p r n  Rather than scheduled  · Discussed weight loss for long-term    2  Mediastinal lymphadenopathy- likely due to post viral infection  Patient is concerned about the lymphadenopathy given family history of lung cancer in both of her parents  Plan:  · Repeat CT of the chest which has already been ordered to be performed on or after March 15th    3  History of tobacco abuse-10 pack-year smoking history and quit in 2007  Continues to abstain from smoking    4  GERD- started on famotidine and reports resolution of her symptoms  1  Shortness of breath    2  History of tobacco abuse    3  Mediastinal lymphadenopathy    4  Gastroesophageal reflux disease without esophagitis      Plan:    Diagnoses and all orders for this visit:    Shortness of breath    History of tobacco abuse    Mediastinal lymphadenopathy    Gastroesophageal reflux disease without esophagitis      Return in about 3 months (around 5/24/2021)  History of Present Illness   HPI:  Chyna Qureshi is a 50 y o  female who that presents for follow-up of her shortness of breath  She reports that she has been having significant improvement in her shortness of breath and is slowly approaching her baseline  She is attempting to be more active    She reports that the Asmanex significantly improves her breathing and she is using the albuterol nebulizer twice daily  Since her last appointment she has undergone testing that included a pulmonary function test which showed some air trapping but without evidence of obstruction or restrictive lung disease on spirometry  She also had a cardiac ultrasound that showed a left ventricular ejection fraction of 65% with grade 1 diastolic dysfunction  She also had a CT a of the chest that showed some mediastinal lymphadenopathy  Due to a slightly elevated D-dimer and complained of leg pain she was also sent for a bilateral lower extremity Doppler study that was negative for DVT  She is also taking the famotidine which she reports has significantly decreased her symptoms of GERD  She has recently established a primary care physician and is scheduled to see him tomorrow  She is also scheduled to obtain her 1st dose of the COVID vaccine in [place] tomorrow    Review of Systems   Constitutional: Negative for appetite change, chills and fever  HENT: Negative for congestion, ear pain, postnasal drip, rhinorrhea, sneezing, sore throat and trouble swallowing  Eyes: Negative for itching  Respiratory: Positive for shortness of breath  Negative for cough, wheezing and stridor  Cardiovascular: Negative for chest pain, palpitations and leg swelling  Gastrointestinal: Negative for abdominal distention, abdominal pain, nausea and vomiting  Genitourinary: Negative for dysuria and flank pain  Musculoskeletal: Positive for myalgias  Negative for arthralgias  Skin: Negative for color change  Neurological: Negative for dizziness, light-headedness and headaches  Psychiatric/Behavioral: Negative          Historical Information   Past Medical History:   Diagnosis Date    Psychiatric disorder      Past Surgical History:   Procedure Laterality Date     SECTION      ENDOMETRIAL ABLATION      HAND SURGERY      OVARIAN CYST DRAINAGE      WRIST SURGERY       Family History   Problem Relation Age of Onset    Cancer Mother     Cancer Father          Meds/Allergies     Current Outpatient Medications:     albuterol (2 5 mg/3 mL) 0 083 % nebulizer solution, Take 1 vial (2 5 mg total) by nebulization 3 (three) times a day, Disp: 270 mL, Rfl: 5    Asmanex  MCG/ACT AERO, Inhale 2 Act (400 mcg total) 2 (two) times a day, Disp: 1 Inhaler, Rfl: 5    escitalopram (LEXAPRO) 20 mg tablet, Take 20 mg by mouth 2 (two) times a day, Disp: , Rfl:     famotidine (PEPCID) 40 MG tablet, Take 0 5 tablets (20 mg total) by mouth daily, Disp: 30 tablet, Rfl: 0    Qsymia 15-92 MG CP24, TAKE 1 CAPSULE BY MOUTH EVERY DAY IN THE MORNING, Disp: , Rfl:     traZODone (DESYREL) 50 mg tablet, , Disp: , Rfl: 0    valACYclovir (VALTREX) 500 mg tablet, , Disp: , Rfl:     albuterol (Ventolin HFA) 90 mcg/act inhaler, Inhale 2 puffs every 6 (six) hours as needed for wheezing (Patient not taking: Reported on 2/24/2021), Disp: 18 g, Rfl: 5    naproxen (NAPROSYN) 500 mg tablet, Take 1 tablet (500 mg total) by mouth 2 (two) times a day with meals (Patient not taking: Reported on 1/27/2021), Disp: 60 tablet, Rfl: 0  Allergies   Allergen Reactions    Duricef [Cefadroxil]     Penicillins        Vitals: Blood pressure 112/80, pulse 94, temperature 97 7 °F (36 5 °C), height 5' 5" (1 651 m), weight 127 kg (280 lb), SpO2 99 %  Body mass index is 46 59 kg/m²  Oxygen Therapy  SpO2: 99 %  Oxygen Therapy: None (Room air)      Physical Exam  Physical Exam  Constitutional:       General: She is not in acute distress  Appearance: She is obese  She is not diaphoretic  HENT:      Head: Normocephalic and atraumatic  Nose: Nose normal       Mouth/Throat:      Pharynx: No oropharyngeal exudate  Eyes:      General: No scleral icterus  Conjunctiva/sclera: Conjunctivae normal       Pupils: Pupils are equal, round, and reactive to light  Neck:      Musculoskeletal: Normal range of motion and neck supple  Thyroid: No thyromegaly  Vascular: No JVD  Trachea: No tracheal deviation  Cardiovascular:      Rate and Rhythm: Normal rate and regular rhythm  Heart sounds: Normal heart sounds  No murmur  No friction rub  No gallop  Pulmonary:      Effort: Pulmonary effort is normal  No respiratory distress  Breath sounds: Normal breath sounds  No stridor  No wheezing or rales  Abdominal:      General: Bowel sounds are normal  There is no distension  Palpations: Abdomen is soft  Tenderness: There is no abdominal tenderness  There is no guarding or rebound  Musculoskeletal: Normal range of motion  General: No deformity  Lymphadenopathy:      Cervical: No cervical adenopathy  Skin:     General: Skin is warm  Findings: No erythema or rash  Neurological:      Mental Status: She is alert and oriented to person, place, and time  Cranial Nerves: No cranial nerve deficit  Sensory: No sensory deficit  Labs: I have personally reviewed pertinent lab results  Lab Results   Component Value Date    WBC 12 42 (H) 01/28/2021    HGB 15 1 01/28/2021    HCT 45 7 01/28/2021    MCV 93 01/28/2021     01/28/2021     Lab Results   Component Value Date    CALCIUM 9 3 01/28/2021    K 3 8 01/28/2021    CO2 26 01/28/2021     (H) 01/28/2021    BUN 19 01/28/2021    CREATININE 0 76 01/28/2021     No results found for: IGE  No results found for: ALT, AST, GGT, ALKPHOS, BILITOT      Imaging and other studies: I have personally reviewed pertinent reports  and I have personally reviewed pertinent films in PACS  CTA on January 28, 2021- normal appearing lung parenchyma bilaterally    Right hilar and mediastinal lymphadenopathy    Pulmonary function testing:   Complete pulmonary function testing from February 23, 2021 reviewed  FEV1/FVC ratio 82%  FEV1 90% predicted  FVC 88% predicted  % predicted  % predicted  DLCO 95% predicted  Normal spirometry, air trapping is indicated by the elevated artery, normal diffusion capacity    EKG, Pathology, and Other Studies: I have personally reviewed pertinent reports     and I have personally reviewed pertinent films in PACS  Cardiac echo from February 6, 2021 reviewed-left ventricular ejection fraction is 65% with grade 1 diastolic dysfunction no significant valvular heart disease    Answers for HPI/ROS submitted by the patient on 2/24/2021   Primary symptoms  Do you have a hoarse voice?: Yes  Chronicity: recurrent  When did you first notice your symptoms?: more than 1 month ago  How often do your symptoms occur?: 2 to 4 times per day  Since you first noticed this problem, how has it changed?: gradually improving  Do you have shortness of breath that occurs with effort or exertion?: Yes  Do you have ear congestion?: No  Do you have heartburn?: No  Do you have fatigue?: Yes  Do you have nasal congestion?: No  Do you have shortness of breath when lying flat?: Yes  Do you have shortness of breath when you wake up?: No  Do you have sweats?: No  Have you experienced weight loss?: No  Which of the following makes your symptoms worse?: climbing stairs, exercise, strenuous activity  Which of the following makes your symptoms better?: cold air, rest, steroid inhaler      Kyaw Lindsey MD  Pulmonary and Critical Care Fellow- PGY 5  [facility]

## 2021-02-25 ENCOUNTER — OFFICE VISIT (OUTPATIENT)
Dept: INTERNAL MEDICINE CLINIC | Facility: CLINIC | Age: 46
End: 2021-02-25
Payer: COMMERCIAL

## 2021-02-25 VITALS
TEMPERATURE: 98.4 F | DIASTOLIC BLOOD PRESSURE: 92 MMHG | BODY MASS INDEX: 43.09 KG/M2 | SYSTOLIC BLOOD PRESSURE: 134 MMHG | HEIGHT: 64 IN | OXYGEN SATURATION: 98 % | HEART RATE: 88 BPM | WEIGHT: 252.4 LBS

## 2021-02-25 DIAGNOSIS — F33.9 EPISODE OF RECURRENT MAJOR DEPRESSIVE DISORDER, UNSPECIFIED DEPRESSION EPISODE SEVERITY (HCC): ICD-10-CM

## 2021-02-25 DIAGNOSIS — Z12.31 ENCOUNTER FOR SCREENING MAMMOGRAM FOR MALIGNANT NEOPLASM OF BREAST: Primary | ICD-10-CM

## 2021-02-25 DIAGNOSIS — R59.0 MEDIASTINAL LYMPHADENOPATHY: ICD-10-CM

## 2021-02-25 DIAGNOSIS — E66.01 CLASS 2 SEVERE OBESITY DUE TO EXCESS CALORIES WITH SERIOUS COMORBIDITY IN ADULT, UNSPECIFIED BMI (HCC): ICD-10-CM

## 2021-02-25 DIAGNOSIS — Z83.1 FAMILY HISTORY OF COLD SORES: ICD-10-CM

## 2021-02-25 DIAGNOSIS — E78.5 HYPERLIPIDEMIA, UNSPECIFIED HYPERLIPIDEMIA TYPE: ICD-10-CM

## 2021-02-25 DIAGNOSIS — K21.9 GASTROESOPHAGEAL REFLUX DISEASE WITHOUT ESOPHAGITIS: ICD-10-CM

## 2021-02-25 DIAGNOSIS — R06.02 SHORTNESS OF BREATH: ICD-10-CM

## 2021-02-25 DIAGNOSIS — Z86.19 HISTORY OF COLD SORES: ICD-10-CM

## 2021-02-25 PROBLEM — E66.813 CLASS 3 SEVERE OBESITY WITHOUT SERIOUS COMORBIDITY WITH BODY MASS INDEX (BMI) OF 40.0 TO 44.9 IN ADULT (HCC): Status: ACTIVE | Noted: 2021-02-25

## 2021-02-25 PROBLEM — E66.9 OBESITY: Status: ACTIVE | Noted: 2021-02-25

## 2021-02-25 PROCEDURE — 99203 OFFICE O/P NEW LOW 30 MIN: CPT | Performed by: INTERNAL MEDICINE

## 2021-02-25 PROCEDURE — 3008F BODY MASS INDEX DOCD: CPT | Performed by: INTERNAL MEDICINE

## 2021-02-25 PROCEDURE — 3725F SCREEN DEPRESSION PERFORMED: CPT | Performed by: INTERNAL MEDICINE

## 2021-02-25 PROCEDURE — 1036F TOBACCO NON-USER: CPT | Performed by: INTERNAL MEDICINE

## 2021-02-25 RX ORDER — ALPRAZOLAM 0.5 MG/1
0.5 TABLET ORAL 2 TIMES DAILY PRN
COMMUNITY
End: 2021-09-02

## 2021-02-25 RX ORDER — IBUPROFEN 200 MG
600 TABLET ORAL AS NEEDED
COMMUNITY
End: 2022-01-25 | Stop reason: HOSPADM

## 2021-02-25 RX ORDER — PHENTERMINE AND TOPIRAMATE 15; 92 MG/1; MG/1
1 CAPSULE, EXTENDED RELEASE ORAL DAILY
Qty: 30 CAPSULE | Refills: 0 | Status: SHIPPED | OUTPATIENT
Start: 2021-02-25 | End: 2021-05-03 | Stop reason: SDUPTHER

## 2021-02-25 RX ORDER — FAMOTIDINE 20 MG/1
20 TABLET, FILM COATED ORAL DAILY
Qty: 90 TABLET | Refills: 1 | Status: SHIPPED | OUTPATIENT
Start: 2021-02-25 | End: 2021-09-02 | Stop reason: SDUPTHER

## 2021-02-25 RX ORDER — VALACYCLOVIR HYDROCHLORIDE 500 MG/1
500 TABLET, FILM COATED ORAL DAILY
Qty: 90 TABLET | Refills: 1 | Status: SHIPPED | OUTPATIENT
Start: 2021-02-25 | End: 2021-12-07 | Stop reason: SDUPTHER

## 2021-02-25 NOTE — ASSESSMENT & PLAN NOTE
Patient's BMI is currently 43 32  She is taking qsymia and has seen results from this in the past  Will follow up and patient will call every month with an update of her weight to reorder the Qsymia  Recommended decreasing portion size, overall caloric intake and physical activity, if able to tolerate due to her shortness of breath

## 2021-02-25 NOTE — ASSESSMENT & PLAN NOTE
On CT, patient was noted to have a hiatal hernia  Was placed on famotidine and has seen a decrease in symptoms  Will monitor

## 2021-02-25 NOTE — PROGRESS NOTES
Assessment/Plan:    Obesity  Patient's BMI is currently 43 32  She is taking qsymia and has seen results from this in the past  Will follow up and patient will call every month with an update of her weight to reorder the Qsymia  Recommended decreasing portion size, overall caloric intake and physical activity, if able to tolerate due to her shortness of breath  Gastroesophageal reflux disease without esophagitis  On CT, patient was noted to have a hiatal hernia  Was placed on famotidine and has seen a decrease in symptoms  Will monitor  Mediastinal lymphadenopathy  Last CT showed mediastinal lymphadenopathy  (2 nodes >1 cm each)  Repeat CT on March 15  Shortness of breath  Secondary to Covid pneumonitis  On albuterol and asmanex  Slowly improving  Patient reports being able to go about her daily life with only some shortness of breath  Not debilitating  Will monitor  Patient followed by Dr Marjorie Christine in pulmonlogy  Problem List Items Addressed This Visit        Digestive    Gastroesophageal reflux disease without esophagitis     On CT, patient was noted to have a hiatal hernia  Was placed on famotidine and has seen a decrease in symptoms  Will monitor  Relevant Medications    famotidine (PEPCID) 20 mg tablet       Cardiovascular and Mediastinum    Mediastinal lymphadenopathy     Last CT showed mediastinal lymphadenopathy  (2 nodes >1 cm each)  Repeat CT on March 15  Other    Shortness of breath     Secondary to Covid pneumonitis  On albuterol and asmanex  Slowly improving  Patient reports being able to go about her daily life with only some shortness of breath  Not debilitating  Will monitor  Patient followed by Dr Marjorie Christine in pulmonlogy  Obesity     Patient's BMI is currently 43 32  She is taking qsymia and has seen results from this in the past  Will follow up and patient will call every month with an update of her weight to reorder the Qsymia   Recommended decreasing portion size, overall caloric intake and physical activity, if able to tolerate due to her shortness of breath  Relevant Medications    Qsymia 15-92 MG CP24    Recurrent major depressive disorder (HCC)    Relevant Medications    Qsymia 15-92 MG CP24    ALPRAZolam (XANAX) 0 5 mg tablet    Hyperlipidemia    Relevant Orders    Comprehensive metabolic panel    CBC and differential    Lipid panel      Other Visit Diagnoses     Encounter for screening mammogram for malignant neoplasm of breast    -  Primary    Relevant Orders    Mammo screening bilateral w cad    Family history of cold sores        History of cold sores        Relevant Medications    valACYclovir (VALTREX) 500 mg tablet                  Time spent during encounter: 30 minutes (counseling, reviewing medications, and discussing treatment and plan)    Subjective:      Patient ID: Daniel Hopkins is a 50 y o  female  Chief Complaint   Patient presents with   [address]     She is establishing care  She tested positive on 12/5/2020  Mammogram done 8/19/2020, Pap done 8/3/2020  Patient provided reports  Will send to scanning and care gap messages sent  Recent lab work done  BMI follow up plan needed       Oscar Sherman is a 49 yo female with PMH of shortness of breath secondary to covid pneumonitis, depression, GERD who presents today today establish care  Overall, patient states that she is doing a lot better since her covid diagnosis in December 2020  Patient tested positive forcovid 12/5/20 and is still experiencing shortness of breath  She was never hospitalized  She completed 3 rounds of steroids due to continued low O2 saturations  She was also placed an Asmanex  And albuterol inhaler, which improves her symptoms  Patient reports that she continues to have a decrease in taste and smell, but it is slowly improving  D-dimer was elevated, but doppler was negative for DVT    On CT, patient was found to have 2 enlarged mediastinal lymph nodes, basilar atelectasis vs ground glass appearances  She is getting a repeat CT on March 15  Recent PFTs showed air trapping, but no evidence of obstructive or restrictive lung disease  Patient is following with Dr Kamilah Reddy in pulmonology  Patient has a significant history of depression and reports that she has been on/off depressed since 13 y o  She is currently on lexapro and states that this is the only medication that has worked for her  Patient reports that there is a significant family history of psychiatric illnesses and that her great grandmother committed suicide and her son was recently diagnosed with Bipolar disorder  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  She is able to use her left hand and has full sensation with only minor decreased ROM  The following portions of the patient's history were reviewed and updated as appropriate: allergies, current medications, past family history, past medical history, past social history, past surgical history and problem list     Review of Systems   Constitutional: Negative for appetite change, chills, fatigue and fever  Respiratory: Positive for shortness of breath  Negative for cough, chest tightness, wheezing and stridor  Cardiovascular: Negative for chest pain and palpitations  Gastrointestinal: Negative for abdominal pain, constipation, diarrhea, nausea and vomiting  Musculoskeletal: Positive for back pain and myalgias  Neurological: Positive for numbness           Past Medical History:   Diagnosis Date    Psychiatric disorder          Current Outpatient Medications:     albuterol (2 5 mg/3 mL) 0 083 % nebulizer solution, Take 1 vial (2 5 mg total) by nebulization 3 (three) times a day, Disp: 270 mL, Rfl: 5    albuterol (Ventolin HFA) 90 mcg/act inhaler, Inhale 2 puffs every 6 (six) hours as needed for wheezing, Disp: 18 g, Rfl: 5    ALPRAZolam (XANAX) 0 5 mg tablet, Take 0 5 mg by mouth 2 (two) times a day as needed, Disp: , Rfl:     Asmanex  MCG/ACT AERO, Inhale 2 Act (400 mcg total) 2 (two) times a day, Disp: 1 Inhaler, Rfl: 5    escitalopram (LEXAPRO) 20 mg tablet, Take 20 mg by mouth daily , Disp: , Rfl:     ibuprofen (MOTRIN) 200 mg tablet, Take 600 mg by mouth 2 (two) times a day, Disp: , Rfl:     Qsymia 15-92 MG CP24, Take 1 capsule by mouth daily, Disp: 30 capsule, Rfl: 0    traZODone (DESYREL) 50 mg tablet, , Disp: , Rfl: 0    valACYclovir (VALTREX) 500 mg tablet, Take 1 tablet (500 mg total) by mouth daily, Disp: 90 tablet, Rfl: 1    famotidine (PEPCID) 20 mg tablet, Take 1 tablet (20 mg total) by mouth daily, Disp: 90 tablet, Rfl: 1    naproxen (NAPROSYN) 500 mg tablet, Take 1 tablet (500 mg total) by mouth 2 (two) times a day with meals (Patient not taking: Reported on 2021), Disp: 60 tablet, Rfl: 0    Allergies   Allergen Reactions    Duricef [Cefadroxil]     Penicillins        Social History   Past Surgical History:   Procedure Laterality Date     SECTION      ENDOMETRIAL ABLATION      HAND SURGERY      OVARIAN CYST DRAINAGE      WRIST SURGERY       Family History   Problem Relation Age of Onset    Cancer Mother     Cancer Father        Objective:  /92 (BP Location: Right arm, Patient Position: Sitting, Cuff Size: Large)   Pulse 88   Temp 98 4 °F (36 9 °C) (Tympanic)   Ht 5' 4" (1 626 m)   Wt 114 kg (252 lb 6 4 oz)   SpO2 98% Comment: Room Air  BMI 43 32 kg/m²  BMI Counseling: Body mass index is 43 32 kg/m²  The BMI is above normal  Nutrition recommendations include reducing portion sizes, decreasing overall calorie intake, 3-5 servings of fruits/vegetables daily, consuming healthier snacks, moderation in carbohydrate intake, increasing intake of lean protein, reducing intake of saturated fat and trans fat and reducing intake of cholesterol  Exercise recommendations include exercising 3-5 times per week      Recent Results (from the past 1344 hour(s)) Basic metabolic panel    Collection Time: 01/28/21  7:06 AM   Result Value Ref Range    Sodium 141 136 - 145 mmol/L    Potassium 3 8 3 5 - 5 3 mmol/L    Chloride 109 (H) 100 - 108 mmol/L    CO2 26 21 - 32 mmol/L    ANION GAP 6 4 - 13 mmol/L    BUN 19 5 - 25 mg/dL    Creatinine 0 76 0 60 - 1 30 mg/dL    Glucose, Fasting 124 (H) 65 - 99 mg/dL    Calcium 9 3 8 3 - 10 1 mg/dL    eGFR 94 ml/min/1 73sq m   CBC and differential    Collection Time: 01/28/21  7:06 AM   Result Value Ref Range    WBC 12 42 (H) 4 31 - 10 16 Thousand/uL    RBC 4 90 3 81 - 5 12 Million/uL    Hemoglobin 15 1 11 5 - 15 4 g/dL    Hematocrit 45 7 34 8 - 46 1 %    MCV 93 82 - 98 fL    MCH 30 8 26 8 - 34 3 pg    MCHC 33 0 31 4 - 37 4 g/dL    RDW 13 2 11 6 - 15 1 %    MPV 9 9 8 9 - 12 7 fL    Platelets 236 196 - 119 Thousands/uL    nRBC 0 /100 WBCs    Neutrophils Relative 66 43 - 75 %    Immat GRANS % 1 0 - 2 %    Lymphocytes Relative 24 14 - 44 %    Monocytes Relative 6 4 - 12 %    Eosinophils Relative 2 0 - 6 %    Basophils Relative 1 0 - 1 %    Neutrophils Absolute 8 25 (H) 1 85 - 7 62 Thousands/µL    Immature Grans Absolute 0 09 0 00 - 0 20 Thousand/uL    Lymphocytes Absolute 3 02 0 60 - 4 47 Thousands/µL    Monocytes Absolute 0 77 0 17 - 1 22 Thousand/µL    Eosinophils Absolute 0 21 0 00 - 0 61 Thousand/µL    Basophils Absolute 0 08 0 00 - 0 10 Thousands/µL   TSH, 3rd generation with Free T4 reflex    Collection Time: 01/28/21  7:06 AM   Result Value Ref Range    TSH 3RD GENERATON 1 630 0 358 - 3 740 uIU/mL   D-dimer, quantitative    Collection Time: 02/06/21 10:43 AM   Result Value Ref Range    D-Dimer, Quant 1 05 (H) <0 50 ug/ml FEU            Physical Exam  Constitutional:       General: She is not in acute distress  Appearance: She is obese  HENT:      Mouth/Throat:      Mouth: Mucous membranes are moist       Pharynx: Oropharynx is clear  Eyes:      Extraocular Movements: Extraocular movements intact        Conjunctiva/sclera: Conjunctivae normal       Pupils: Pupils are equal, round, and reactive to light  Cardiovascular:      Rate and Rhythm: Normal rate and regular rhythm  Pulses: Normal pulses  Heart sounds: Murmur present  Systolic murmur present  Diastolic murmur present with a grade of 1/4  No friction rub  No gallop  Pulmonary:      Effort: Pulmonary effort is normal  No respiratory distress  Breath sounds: Normal breath sounds  No wheezing  Abdominal:      General: Bowel sounds are normal    Skin:     General: Skin is warm  Capillary Refill: Capillary refill takes less than 2 seconds  Neurological:      General: No focal deficit present  Mental Status: She is alert and oriented to person, place, and time  Mental status is at baseline

## 2021-02-25 NOTE — ASSESSMENT & PLAN NOTE
Secondary to Covid pneumonitis  On albuterol and asmanex  Slowly improving  Patient reports being able to go about her daily life with only some shortness of breath  Not debilitating  Will monitor  Patient followed by Dr Mariza Hahn in pulmonlogy

## 2021-02-26 ENCOUNTER — TELEPHONE (OUTPATIENT)
Dept: ADMINISTRATIVE | Facility: OTHER | Age: 46
End: 2021-02-26

## 2021-02-26 NOTE — TELEPHONE ENCOUNTER
----- Message from Thi Hernadez RN sent at 2/25/2021 10:33 AM EST -----  Regarding: Pap, mammogram,  02/25/21 10:35 AM    Hello, our patient Enriqueta Sweeney has had Pap Smear (HPV) aka Cervical Cancer Screening completed/performed  Please assist in updating the patient chart by pulling the document from the Lab tab The date of service is 08/03/2020  Thank you,  Thi Hernadez RN  [facility]     02/25/21 10:37 AM    Hello, our patient Enriqueta Sweeney has had Mammogram completed/performed  Please assist in updating the patient chart by pulling the document from imaging Tab within Chart Review  The date of service is 08/19/2020  Thank you,  Thi Hernadez RN  [facility]    Copies of reports are being faxed to central scanning today after her office visit

## 2021-02-26 NOTE — TELEPHONE ENCOUNTER
----- Message from Donna Avery RN sent at 2/25/2021 10:40 AM EST -----  Regarding: vaccines  02/25/21 10:41 AM    Hello, our patient Emory Solorio has had Immunization(s) completed/performed  Please assist in updating the patient chart by pulling the document from the Media Tab  The date of service is 01/06/2016       Thank you,  Donna Avery RN  [facility]

## 2021-02-26 NOTE — TELEPHONE ENCOUNTER
----- Message from Lisandra Zelaya RN sent at 2/25/2021 10:33 AM EST -----  Regarding: Pap, mammogram,  02/25/21 10:35 AM    Hello, our patient Emmy Drew has had Pap Smear (HPV) aka Cervical Cancer Screening completed/performed  Please assist in updating the patient chart by pulling the document from the Lab tab The date of service is 08/03/2020  Thank you,  Lisandra Zelaya RN  [facility]     02/25/21 10:37 AM    Hello, our patient Emmy Drew has had Mammogram completed/performed  Please assist in updating the patient chart by pulling the document from imaging Tab within Chart Review  The date of service is 08/19/2020  Thank you,  Lisandra Zelaya RN  [facility]    Copies of reports are being faxed to central scanning today after her office visit

## 2021-02-26 NOTE — TELEPHONE ENCOUNTER
----- Message from Jennifer Dempsey RN sent at 2/25/2021 10:33 AM EST -----  Regarding: Pap, mammogram,  02/25/21 10:35 AM    Hello, our patient Cristina Dillon has had Pap Smear (HPV) aka Cervical Cancer Screening completed/performed  Please assist in updating the patient chart by pulling the document from the Lab tab The date of service is 08/03/2020  Thank you,  Jennifer Dempsey RN  [facility]     02/25/21 10:37 AM    Hello, our patient Cristina Dillon has had Mammogram completed/performed  Please assist in updating the patient chart by pulling the document from imaging Tab within Chart Review  The date of service is 08/19/2020  Thank you,  Jennifer Dempsey RN  [facility]    Copies of reports are being faxed to central scanning today after her office visit

## 2021-02-26 NOTE — TELEPHONE ENCOUNTER
Upon review of the In Basket request we were able to locate, review, and update the patient chart as requested for Immunization(s) Tdapt  Any additional questions or concerns should be emailed to the Practice Liaisons via [email or web address] email, please do not reply via In Basket      Thank you  Roxi Osorio MA

## 2021-02-26 NOTE — TELEPHONE ENCOUNTER
----- Message from Ty Lemon RN sent at 2/25/2021 10:33 AM EST -----  Regarding: Pap, mammogram,  02/25/21 10:35 AM    Hello, our patient Oswaldo Cooper has had Pap Smear (HPV) aka Cervical Cancer Screening completed/performed  Please assist in updating the patient chart by pulling the document from the Lab tab The date of service is 08/03/2020  Thank you,  Ty Lemon RN  [facility]     02/25/21 10:37 AM    Hello, our patient Oswaldo Cooper has had Mammogram completed/performed  Please assist in updating the patient chart by pulling the document from imaging Tab within Chart Review  The date of service is 08/19/2020  Thank you,  Ty Lemon RN  [facility]    Copies of reports are being faxed to central scanning today after her office visit

## 2021-03-08 NOTE — TELEPHONE ENCOUNTER
Upon review of the In Basket request we have found this is a duplicate request and no further action is needed  This request was completed upon initial request, the patient chart is up to date, and this message will now be closed  Any additional questions or concerns should be emailed to the Practice Liaisons via [email or web address] email, please do not reply via In Basket      Thank you  Pearl Sims

## 2021-03-10 DIAGNOSIS — Z23 ENCOUNTER FOR IMMUNIZATION: ICD-10-CM

## 2021-03-16 ENCOUNTER — TELEPHONE (OUTPATIENT)
Dept: PULMONOLOGY | Facility: CLINIC | Age: 46
End: 2021-03-16

## 2021-03-24 ENCOUNTER — HOSPITAL ENCOUNTER (OUTPATIENT)
Dept: RADIOLOGY | Facility: HOSPITAL | Age: 46
Discharge: HOME | End: 2021-03-24
Attending: INTERNAL MEDICINE
Payer: COMMERCIAL

## 2021-03-24 ENCOUNTER — TRANSCRIBE ORDERS (OUTPATIENT)
Dept: RADIOLOGY | Facility: HOSPITAL | Age: 46
End: 2021-03-24

## 2021-03-24 DIAGNOSIS — R93.89 ABNORMAL CT OF THE CHEST: ICD-10-CM

## 2021-03-24 PROCEDURE — 71250 CT THORAX DX C-: CPT

## 2021-03-24 PROCEDURE — G1004 CDSM NDSC: HCPCS

## 2021-03-25 ENCOUNTER — TELEPHONE (OUTPATIENT)
Dept: INTERNAL MEDICINE CLINIC | Facility: CLINIC | Age: 46
End: 2021-03-25

## 2021-04-01 NOTE — TELEPHONE ENCOUNTER
Pt called [facility] looking for update information on medication Qsymia, I checked Covermymeds and I do not see anything was determined       Please advise and let patient know about medication

## 2021-05-03 DIAGNOSIS — F33.9 EPISODE OF RECURRENT MAJOR DEPRESSIVE DISORDER, UNSPECIFIED DEPRESSION EPISODE SEVERITY (HCC): Primary | ICD-10-CM

## 2021-05-03 DIAGNOSIS — E66.01 CLASS 2 SEVERE OBESITY DUE TO EXCESS CALORIES WITH SERIOUS COMORBIDITY IN ADULT, UNSPECIFIED BMI (HCC): ICD-10-CM

## 2021-05-05 ENCOUNTER — TELEPHONE (OUTPATIENT)
Dept: INTERNAL MEDICINE CLINIC | Facility: CLINIC | Age: 46
End: 2021-05-05

## 2021-05-05 RX ORDER — TRAZODONE HYDROCHLORIDE 50 MG/1
50 TABLET ORAL
Qty: 90 TABLET | Refills: 1 | Status: SHIPPED | OUTPATIENT
Start: 2021-05-05 | End: 2021-09-02 | Stop reason: SDUPTHER

## 2021-05-05 RX ORDER — PHENTERMINE AND TOPIRAMATE 15; 92 MG/1; MG/1
1 CAPSULE, EXTENDED RELEASE ORAL DAILY
Qty: 30 CAPSULE | Refills: 0 | Status: SHIPPED | OUTPATIENT
Start: 2021-05-05 | End: 2021-09-01

## 2021-05-05 NOTE — TELEPHONE ENCOUNTER
Aaron Trent called and was requesting a refill for her Qsymia 15-92 MG CP24 Take 1 capsule by mouth daily and traZODone (DESYREL) 50 mg tablet  I confirmed with [facility] pharmacy that they did not receive the prescription of the Qsymia back on 02/25/21 from our office  Can this please be resent as she is currently out of these medications  She advised that she had left multiple message on the prescription refill line with no response  Thank you!

## 2021-05-06 NOTE — TELEPHONE ENCOUNTER
Called patient and LMOM that RX for Natan Lima and Trazodone was sent to [facility] [address] on 5/5/21

## 2021-06-02 ENCOUNTER — OFFICE VISIT (OUTPATIENT)
Dept: PULMONOLOGY | Facility: CLINIC | Age: 46
End: 2021-06-02
Payer: COMMERCIAL

## 2021-06-02 VITALS
HEART RATE: 86 BPM | DIASTOLIC BLOOD PRESSURE: 78 MMHG | RESPIRATION RATE: 20 BRPM | SYSTOLIC BLOOD PRESSURE: 118 MMHG | WEIGHT: 278 LBS | BODY MASS INDEX: 47.46 KG/M2 | HEIGHT: 64 IN | OXYGEN SATURATION: 98 %

## 2021-06-02 DIAGNOSIS — R59.0 MEDIASTINAL LYMPHADENOPATHY: ICD-10-CM

## 2021-06-02 DIAGNOSIS — Z87.891 HISTORY OF TOBACCO ABUSE: ICD-10-CM

## 2021-06-02 DIAGNOSIS — R06.02 SHORTNESS OF BREATH: Primary | ICD-10-CM

## 2021-06-02 PROCEDURE — 1036F TOBACCO NON-USER: CPT | Performed by: INTERNAL MEDICINE

## 2021-06-02 PROCEDURE — 99214 OFFICE O/P EST MOD 30 MIN: CPT | Performed by: INTERNAL MEDICINE

## 2021-06-02 PROCEDURE — 3008F BODY MASS INDEX DOCD: CPT | Performed by: INTERNAL MEDICINE

## 2021-06-02 NOTE — PROGRESS NOTES
Pulmonary Follow Up Note   Madonna Guzman 50 y o  female MRN: [id number]  6/2/2021      Assessment:    1  Shortness of breath  - 2/2 COVID infection, no resolved  - no longer having any symptoms of SOB  - No further work up necessary    2  History of tobacco abuse  - 10 pack year smoking history and quit in 2007  - Continues to abstain from smoking    3  Mediastinal lymphadenopathy  - Now resolved on recent CT chest  - Small area of GGO in the RUL but clinically patient is asymptomatic  - No further work up from pulmonary standpoint      Plan:    Diagnoses and all orders for this visit:    Shortness of breath    History of tobacco abuse    Mediastinal lymphadenopathy      Return in about 4 months (around 10/2/2021)  History of Present Illness   HPI:  Madonna Guzman is a 50 y o  female who presents for f/up of SOB and lymphadenopathy with prior history of COVID infection  Since her last visit she reports now being asymptomatic from a pulmonary standpoint  Denies any SOB or cough  She is able to garden and perform lots of outside activities without any respiratory limitations  She does describe vague bilateral lower extremity pain that is new  She has no fevers, recent sick contacts  She has received both covid vaccines  Review of Systems   Constitutional: Negative for appetite change, chills, diaphoresis, fatigue and fever  HENT: Negative for congestion, ear pain, postnasal drip, rhinorrhea, sneezing, sore throat, trouble swallowing and voice change  Eyes: Negative for redness and itching  Respiratory: Negative for apnea, cough, choking, chest tightness, shortness of breath, wheezing and stridor  Cardiovascular: Negative for chest pain, palpitations and leg swelling  Gastrointestinal: Negative for abdominal distention, abdominal pain, blood in stool, constipation, diarrhea, nausea and vomiting  Endocrine: Negative for polydipsia and polyuria  Genitourinary: Negative for dysuria and flank pain  Musculoskeletal: Positive for myalgias  Negative for arthralgias, back pain and joint swelling  Skin: Negative for pallor and rash  Neurological: Negative for dizziness, syncope, weakness, light-headedness, numbness and headaches  Hematological: Negative for adenopathy  Psychiatric/Behavioral: Negative for agitation         Historical Information   Past Medical History:   Diagnosis Date    Psychiatric disorder      Past Surgical History:   Procedure Laterality Date     SECTION      ENDOMETRIAL ABLATION      HAND SURGERY      OVARIAN CYST DRAINAGE      WRIST SURGERY       Family History   Problem Relation Age of Onset    Cancer Mother     Cancer Father          Meds/Allergies     Current Outpatient Medications:     ALPRAZolam (XANAX) 0 5 mg tablet, Take 0 5 mg by mouth 2 (two) times a day as needed, Disp: , Rfl:     escitalopram (LEXAPRO) 20 mg tablet, Take 20 mg by mouth daily , Disp: , Rfl:     famotidine (PEPCID) 20 mg tablet, Take 1 tablet (20 mg total) by mouth daily, Disp: 90 tablet, Rfl: 1    ibuprofen (MOTRIN) 200 mg tablet, Take 600 mg by mouth 2 (two) times a day, Disp: , Rfl:     traZODone (DESYREL) 50 mg tablet, Take 1 tablet (50 mg total) by mouth daily at bedtime, Disp: 90 tablet, Rfl: 1    valACYclovir (VALTREX) 500 mg tablet, Take 1 tablet (500 mg total) by mouth daily, Disp: 90 tablet, Rfl: 1    albuterol (2 5 mg/3 mL) 0 083 % nebulizer solution, Take 1 vial (2 5 mg total) by nebulization 3 (three) times a day, Disp: 270 mL, Rfl: 5    albuterol (Ventolin HFA) 90 mcg/act inhaler, Inhale 2 puffs every 6 (six) hours as needed for wheezing, Disp: 18 g, Rfl: 5    Asmanex  MCG/ACT AERO, Inhale 2 Act (400 mcg total) 2 (two) times a day, Disp: 1 Inhaler, Rfl: 5    naproxen (NAPROSYN) 500 mg tablet, Take 1 tablet (500 mg total) by mouth 2 (two) times a day with meals (Patient not taking: Reported on 2021), Disp: 60 tablet, Rfl: 0    Qsymia 15-92 MG CP24, Take 1 capsule by mouth daily (Patient not taking: Reported on 6/2/2021), Disp: 30 capsule, Rfl: 0  Allergies   Allergen Reactions    Duricef [Cefadroxil] Anaphylaxis    Penicillins Anaphylaxis       Vitals: Blood pressure 118/78, pulse 86, resp  rate 20, height 5' 4" (1 626 m), weight 126 kg (278 lb), SpO2 98 %  Body mass index is 47 72 kg/m²  Oxygen Therapy  SpO2: 98 %      Physical Exam  Physical Exam  Constitutional:       General: She is not in acute distress  Appearance: She is not diaphoretic  HENT:      Head: Normocephalic and atraumatic  Nose: Nose normal       Mouth/Throat:      Pharynx: No oropharyngeal exudate  Eyes:      General: No scleral icterus  Conjunctiva/sclera: Conjunctivae normal       Pupils: Pupils are equal, round, and reactive to light  Neck:      Musculoskeletal: Normal range of motion and neck supple  Thyroid: No thyromegaly  Vascular: No JVD  Trachea: No tracheal deviation  Cardiovascular:      Rate and Rhythm: Normal rate and regular rhythm  Heart sounds: Normal heart sounds  No murmur  No friction rub  No gallop  Pulmonary:      Effort: Pulmonary effort is normal  No respiratory distress  Breath sounds: Normal breath sounds  No stridor  No wheezing or rales  Abdominal:      General: Bowel sounds are normal  There is no distension  Palpations: Abdomen is soft  Tenderness: There is no abdominal tenderness  There is no guarding or rebound  Musculoskeletal: Normal range of motion  General: No deformity  Lymphadenopathy:      Cervical: No cervical adenopathy  Skin:     General: Skin is warm  Findings: No erythema or rash  Neurological:      Mental Status: She is alert and oriented to person, place, and time  Cranial Nerves: No cranial nerve deficit  Sensory: No sensory deficit  Labs: I have personally reviewed pertinent lab results    Lab Results   Component Value Date    WBC 12 42 (H) 01/28/2021 HGB 15 1 01/28/2021    HCT 45 7 01/28/2021    MCV 93 01/28/2021     01/28/2021     Lab Results   Component Value Date    CALCIUM 9 3 01/28/2021    K 3 8 01/28/2021    CO2 26 01/28/2021     (H) 01/28/2021    BUN 19 01/28/2021    CREATININE 0 76 01/28/2021     No results found for: IGE  No results found for: ALT, AST, GGT, ALKPHOS, BILITOT      Imaging and other studies: I have personally reviewed pertinent reports  and I have personally reviewed pertinent films in PACS  CT chest- normal bilateral lung parenchyma, resolved lymphadenopathy, some GGO in the RUL    Pulmonary function testing:   Complete pulmonary function testing from February 23, 2021 reviewed  FEV1/FVC ratio 82%  FEV1 90% predicted  FVC 88% predicted  % predicted  % predicted  DLCO 95% predicted  Normal spirometry, air trapping is indicated by the elevated artery, normal diffusion capacity    EKG, Pathology, and Other Studies: I have personally reviewed pertinent reports        Answers for HPI/ROS submitted by the patient on 6/2/2021   Primary symptoms  Do you have shortness of breath that occurs with effort or exertion?: Yes  Do you have ear congestion?: No  Do you have heartburn?: Yes  Do you have fatigue?: No  Do you have nasal congestion?: No  Do you have shortness of breath when lying flat?: No  Do you have shortness of breath when you wake up?: No  Do you have sweats?: No  Have you experienced weight loss?: No  Which of the following makes your symptoms worse?: strenuous activity  Which of the following makes your symptoms better?: rest      Johanna Chi MD  Pulmonary and Critical Care Fellow- PGY 5  [facility]

## 2021-07-18 ENCOUNTER — HOSPITAL ENCOUNTER (EMERGENCY)
Facility: HOSPITAL | Age: 46
Discharge: HOME | End: 2021-07-18
Attending: EMERGENCY MEDICINE | Admitting: EMERGENCY MEDICINE
Payer: COMMERCIAL

## 2021-07-18 ENCOUNTER — APPOINTMENT (EMERGENCY)
Dept: RADIOLOGY | Facility: HOSPITAL | Age: 46
End: 2021-07-18
Payer: COMMERCIAL

## 2021-07-18 VITALS
HEART RATE: 78 BPM | TEMPERATURE: 98.1 F | BODY MASS INDEX: 48.06 KG/M2 | WEIGHT: 280 LBS | SYSTOLIC BLOOD PRESSURE: 119 MMHG | OXYGEN SATURATION: 98 % | RESPIRATION RATE: 12 BRPM | DIASTOLIC BLOOD PRESSURE: 66 MMHG

## 2021-07-18 DIAGNOSIS — R10.9 ABDOMINAL PAIN: Primary | ICD-10-CM

## 2021-07-18 LAB
ALBUMIN SERPL BCP-MCNC: 3.6 G/DL (ref 3.5–5)
ALP SERPL-CCNC: 53 U/L (ref 46–116)
ALT SERPL W P-5'-P-CCNC: 31 U/L (ref 12–78)
ANION GAP SERPL CALCULATED.3IONS-SCNC: 6 MMOL/L (ref 4–13)
AST SERPL W P-5'-P-CCNC: 22 U/L (ref 5–45)
BASOPHILS # BLD AUTO: 0.07 THOUSANDS/ΜL (ref 0–0.1)
BASOPHILS NFR BLD AUTO: 1 % (ref 0–1)
BILIRUB SERPL-MCNC: 0.61 MG/DL (ref 0.2–1)
BILIRUB UR QL STRIP: NEGATIVE
BUN SERPL-MCNC: 16 MG/DL (ref 5–25)
CALCIUM SERPL-MCNC: 9.8 MG/DL (ref 8.3–10.1)
CHLORIDE SERPL-SCNC: 103 MMOL/L (ref 100–108)
CLARITY UR: CLEAR
CO2 SERPL-SCNC: 28 MMOL/L (ref 21–32)
COLOR UR: YELLOW
CREAT SERPL-MCNC: 0.74 MG/DL (ref 0.6–1.3)
EOSINOPHIL # BLD AUTO: 0.19 THOUSAND/ΜL (ref 0–0.61)
EOSINOPHIL NFR BLD AUTO: 2 % (ref 0–6)
ERYTHROCYTE [DISTWIDTH] IN BLOOD BY AUTOMATED COUNT: 12.2 % (ref 11.6–15.1)
GFR SERPL CREATININE-BSD FRML MDRD: 97 ML/MIN/1.73SQ M
GLUCOSE SERPL-MCNC: 89 MG/DL (ref 65–140)
GLUCOSE UR STRIP-MCNC: NEGATIVE MG/DL
HCG SERPL QL: NEGATIVE
HCT VFR BLD AUTO: 46.1 % (ref 34.8–46.1)
HGB BLD-MCNC: 15.6 G/DL (ref 11.5–15.4)
HGB UR QL STRIP.AUTO: NEGATIVE
IMM GRANULOCYTES # BLD AUTO: 0.05 THOUSAND/UL (ref 0–0.2)
IMM GRANULOCYTES NFR BLD AUTO: 0 % (ref 0–2)
KETONES UR STRIP-MCNC: ABNORMAL MG/DL
LEUKOCYTE ESTERASE UR QL STRIP: NEGATIVE
LIPASE SERPL-CCNC: 101 U/L (ref 73–393)
LYMPHOCYTES # BLD AUTO: 2.91 THOUSANDS/ΜL (ref 0.6–4.47)
LYMPHOCYTES NFR BLD AUTO: 26 % (ref 14–44)
MCH RBC QN AUTO: 30.3 PG (ref 26.8–34.3)
MCHC RBC AUTO-ENTMCNC: 33.8 G/DL (ref 31.4–37.4)
MCV RBC AUTO: 90 FL (ref 82–98)
MONOCYTES # BLD AUTO: 0.75 THOUSAND/ΜL (ref 0.17–1.22)
MONOCYTES NFR BLD AUTO: 7 % (ref 4–12)
NEUTROPHILS # BLD AUTO: 7.37 THOUSANDS/ΜL (ref 1.85–7.62)
NEUTS SEG NFR BLD AUTO: 64 % (ref 43–75)
NITRITE UR QL STRIP: NEGATIVE
NRBC BLD AUTO-RTO: 0 /100 WBCS
PH UR STRIP.AUTO: 5.5 [PH] (ref 4.5–8)
PLATELET # BLD AUTO: 303 THOUSANDS/UL (ref 149–390)
PMV BLD AUTO: 10.2 FL (ref 8.9–12.7)
POTASSIUM SERPL-SCNC: 4 MMOL/L (ref 3.5–5.3)
PROT SERPL-MCNC: 7.8 G/DL (ref 6.4–8.2)
PROT UR STRIP-MCNC: NEGATIVE MG/DL
RBC # BLD AUTO: 5.15 MILLION/UL (ref 3.81–5.12)
SODIUM SERPL-SCNC: 137 MMOL/L (ref 136–145)
SP GR UR STRIP.AUTO: 1.01 (ref 1–1.03)
UROBILINOGEN UR QL STRIP.AUTO: 0.2 E.U./DL
WBC # BLD AUTO: 11.34 THOUSAND/UL (ref 4.31–10.16)

## 2021-07-18 PROCEDURE — 99285 EMERGENCY DEPT VISIT HI MDM: CPT | Performed by: EMERGENCY MEDICINE

## 2021-07-18 PROCEDURE — 74177 CT ABD & PELVIS W/CONTRAST: CPT

## 2021-07-18 PROCEDURE — 96366 THER/PROPH/DIAG IV INF ADDON: CPT

## 2021-07-18 PROCEDURE — 76705 ECHO EXAM OF ABDOMEN: CPT

## 2021-07-18 PROCEDURE — 81003 URINALYSIS AUTO W/O SCOPE: CPT

## 2021-07-18 PROCEDURE — 96365 THER/PROPH/DIAG IV INF INIT: CPT

## 2021-07-18 PROCEDURE — 93005 ELECTROCARDIOGRAM TRACING: CPT

## 2021-07-18 PROCEDURE — 85025 COMPLETE CBC W/AUTO DIFF WBC: CPT | Performed by: EMERGENCY MEDICINE

## 2021-07-18 PROCEDURE — 36415 COLL VENOUS BLD VENIPUNCTURE: CPT | Performed by: EMERGENCY MEDICINE

## 2021-07-18 PROCEDURE — 96375 TX/PRO/DX INJ NEW DRUG ADDON: CPT

## 2021-07-18 PROCEDURE — 83690 ASSAY OF LIPASE: CPT | Performed by: EMERGENCY MEDICINE

## 2021-07-18 PROCEDURE — 84703 CHORIONIC GONADOTROPIN ASSAY: CPT | Performed by: EMERGENCY MEDICINE

## 2021-07-18 PROCEDURE — 80053 COMPREHEN METABOLIC PANEL: CPT | Performed by: EMERGENCY MEDICINE

## 2021-07-18 PROCEDURE — 99285 EMERGENCY DEPT VISIT HI MDM: CPT

## 2021-07-18 RX ORDER — MAGNESIUM HYDROXIDE/ALUMINUM HYDROXICE/SIMETHICONE 120; 1200; 1200 MG/30ML; MG/30ML; MG/30ML
30 SUSPENSION ORAL ONCE
Status: COMPLETED | OUTPATIENT
Start: 2021-07-18 | End: 2021-07-18

## 2021-07-18 RX ORDER — KETOROLAC TROMETHAMINE 30 MG/ML
15 INJECTION, SOLUTION INTRAMUSCULAR; INTRAVENOUS ONCE
Status: COMPLETED | OUTPATIENT
Start: 2021-07-18 | End: 2021-07-18

## 2021-07-18 RX ORDER — HYDROMORPHONE HCL/PF 1 MG/ML
0.5 SYRINGE (ML) INJECTION ONCE
Status: COMPLETED | OUTPATIENT
Start: 2021-07-18 | End: 2021-07-18

## 2021-07-18 RX ORDER — ONDANSETRON 2 MG/ML
4 INJECTION INTRAMUSCULAR; INTRAVENOUS ONCE
Status: COMPLETED | OUTPATIENT
Start: 2021-07-18 | End: 2021-07-18

## 2021-07-18 RX ORDER — SODIUM CHLORIDE, SODIUM GLUCONATE, SODIUM ACETATE, POTASSIUM CHLORIDE, MAGNESIUM CHLORIDE, SODIUM PHOSPHATE, DIBASIC, AND POTASSIUM PHOSPHATE .53; .5; .37; .037; .03; .012; .00082 G/100ML; G/100ML; G/100ML; G/100ML; G/100ML; G/100ML; G/100ML
1000 INJECTION, SOLUTION INTRAVENOUS ONCE
Status: COMPLETED | OUTPATIENT
Start: 2021-07-18 | End: 2021-07-18

## 2021-07-18 RX ADMIN — ONDANSETRON 4 MG: 2 INJECTION INTRAMUSCULAR; INTRAVENOUS at 16:18

## 2021-07-18 RX ADMIN — FAMOTIDINE 20 MG: 10 INJECTION INTRAVENOUS at 18:43

## 2021-07-18 RX ADMIN — KETOROLAC TROMETHAMINE 15 MG: 30 INJECTION, SOLUTION INTRAMUSCULAR at 18:41

## 2021-07-18 RX ADMIN — IOHEXOL 100 ML: 350 INJECTION, SOLUTION INTRAVENOUS at 17:45

## 2021-07-18 RX ADMIN — SODIUM CHLORIDE, SODIUM GLUCONATE, SODIUM ACETATE, POTASSIUM CHLORIDE, MAGNESIUM CHLORIDE, SODIUM PHOSPHATE, DIBASIC, AND POTASSIUM PHOSPHATE 1000 ML: .53; .5; .37; .037; .03; .012; .00082 INJECTION, SOLUTION INTRAVENOUS at 16:20

## 2021-07-18 RX ADMIN — HYDROMORPHONE HYDROCHLORIDE 0.5 MG: 1 INJECTION, SOLUTION INTRAMUSCULAR; INTRAVENOUS; SUBCUTANEOUS at 16:17

## 2021-07-18 RX ADMIN — ALUMINUM HYDROXIDE, MAGNESIUM HYDROXIDE, AND SIMETHICONE 30 ML: 200; 200; 20 SUSPENSION ORAL at 18:43

## 2021-07-18 NOTE — DISCHARGE INSTRUCTIONS
Please call your PCP to make appointment in the next 1-2 days  Return to the ER if you develop high fevers or severe or worsening pain  Increase your famotidine to 2 times daily for the next two weeks

## 2021-07-18 NOTE — ED ATTENDING ATTESTATION
Alfonso Huff MD, saw and evaluated the patient  I have discussed the patient with the resident and agree with the resident's findings, Plan of Care, and MDM as documented in the resident's note, except where noted  All available labs and Radiology studies were reviewed  I was present for key portions of any procedure(s) performed by the resident and I was immediately available to provide assistance  At this point I agree with the current assessment done in the Emergency Department  I have conducted an independent evaluation of this patient a history and physical is as follows:    51 yo morbidly obese female with a history of GERD, major depressive disorder, and hyperlipidemia presents to the ED complaining of right flank and right upper abdominal pain x 3 days  The patient reports a "stabbing" pain in the right upper abdomen that radiates into the left flank  Pain is worse with food intake and deep inspiration  (+) Nausea but no vomiting  No diarrhea or constipation  She denies dysuria/hematuria  No lower abdominal or pelvic pain  No vaginal bleeding or discharge  No chest pain, shortness of breath, or cough  No LE swelling/pain  No other specific complaints  Gen: NAD, AA&Ox3  HEENT: PERRL, EOMI  Neck: supple  CV: RRR  Lungs: CT AB/L  Abdomen: soft, (+) RUQ tenderness, no rebound or guarding  Ext: no swelling or deformity  Neuro: 5/5 strength all extremities      ED Course  The patient is uncomfortable appearing with right upper abdominal tenderness on exam  Will check basic labs, lipase, and RUQ ultrasound +/- CT A/P  Pain medications, IVFs, and Zofran administered  Will continue to monitor in the ED     19:28 Workup unremarkable  The patient says she feels "better" and is now able to tolerate POs  Etiology of he pain remains unclear  Plan for supportive care and close follow up with her PCP in 1-2 days for reassessment  The patient is agreeable to this plan   Strict return precautions provided      Critical Care Time  Procedures

## 2021-07-18 NOTE — ED PROVIDER NOTES
History  Chief Complaint   Patient presents with    Flank Pain     Pt reports 3 days of right flank pain, unable to tolerate PO since 10am yesterday  Pt reports now pain is so bad reports pain with inspiration   Shortness of Breath     HPI     75-year-old obese female with no significant past medical history who presents for evaluation of right upper quadrant abdominal pain  Patient states the abdominal pain started 3 days ago  Pain radiates into the right flank region as well  Pain feels like a stabbing sensation  Pain is made worse with movement or taking a deep breath  Patient has tried taking Advil and Tylenol without relief of pain  Pain is made slightly worse with eating  She states the pain got worse today so she came in  Pain has been constant since it started  Patient has never had pain like this in the past   Patient states she has had associated nausea but no vomiting  Denies diarrhea or constipation  Patient denies any dysuria or hematuria  Denies any fevers, chills, cough, new chest pain, or shortness of breath  Patient has had a prior  and laproscopic ovarian cyst drainage  Denies any other abdominal surgeries  Patient has a history of an endometrial ablation and no longer gets her menstrual periods  Patient endorses occasional alcohol use  Denies any tobacco or recreational drug use  Prior to Admission Medications   Prescriptions Last Dose Informant Patient Reported? Taking?    ALPRAZolam (XANAX) 0 5 mg tablet   Yes No   Sig: Take 0 5 mg by mouth 2 (two) times a day as needed   Asmanex  MCG/ACT AERO  Self No No   Sig: Inhale 2 Act (400 mcg total) 2 (two) times a day   Qsymia 15-92 MG CP24   No No   Sig: Take 1 capsule by mouth daily   Patient not taking: Reported on 2021   albuterol (2 5 mg/3 mL) 0 083 % nebulizer solution  Self No No   Sig: Take 1 vial (2 5 mg total) by nebulization 3 (three) times a day   albuterol (Ventolin HFA) 90 mcg/act inhaler  Self No No   Sig: Inhale 2 puffs every 6 (six) hours as needed for wheezing   escitalopram (LEXAPRO) 20 mg tablet  Self Yes No   Sig: Take 20 mg by mouth daily    famotidine (PEPCID) 20 mg tablet   No No   Sig: Take 1 tablet (20 mg total) by mouth daily   ibuprofen (MOTRIN) 200 mg tablet  Self Yes No   Sig: Take 600 mg by mouth 2 (two) times a day   naproxen (NAPROSYN) 500 mg tablet  Self No No   Sig: Take 1 tablet (500 mg total) by mouth 2 (two) times a day with meals   Patient not taking: Reported on 2021   traZODone (DESYREL) 50 mg tablet   No No   Sig: Take 1 tablet (50 mg total) by mouth daily at bedtime   valACYclovir (VALTREX) 500 mg tablet   No No   Sig: Take 1 tablet (500 mg total) by mouth daily      Facility-Administered Medications: None       Past Medical History:   Diagnosis Date    Psychiatric disorder        Past Surgical History:   Procedure Laterality Date     SECTION      ENDOMETRIAL ABLATION      HAND SURGERY      OVARIAN CYST DRAINAGE      WRIST SURGERY         Family History   Problem Relation Age of Onset    Cancer Mother     Cancer Father      I have reviewed and agree with the history as documented  E-Cigarette/Vaping    E-Cigarette Use Never User      E-Cigarette/Vaping Substances     Social History     Tobacco Use    Smoking status: Former Smoker     Packs/day: 1 00     Years: 10 00     Pack years: 10 00     Types: Cigarettes     Quit date:      Years since quittin 5    Smokeless tobacco: Never Used   Vaping Use    Vaping Use: Never used   Substance Use Topics    Alcohol use: Not Currently     Comment: social    Drug use: No        Review of Systems   Constitutional: Positive for appetite change  Negative for chills, fatigue and fever  HENT: Negative for congestion, rhinorrhea and sore throat  Respiratory: Negative for cough and shortness of breath  Cardiovascular: Negative for chest pain  Gastrointestinal: Positive for abdominal pain and nausea  Negative for blood in stool, constipation, diarrhea and vomiting  Genitourinary: Negative for dysuria, frequency, hematuria and urgency  Musculoskeletal: Negative for arthralgias, back pain and myalgias  Skin: Negative for color change, pallor and rash  Neurological: Negative for dizziness, weakness, light-headedness, numbness and headaches  All other systems reviewed and are negative  Physical Exam  ED Triage Vitals [07/18/21 1544]   Temperature Pulse Respirations Blood Pressure SpO2   98 5 °F (36 9 °C) 97 22 134/69 98 %      Temp src Heart Rate Source Patient Position - Orthostatic VS BP Location FiO2 (%)   -- Monitor Sitting Left arm --      Pain Score       9             Orthostatic Vital Signs  Vitals:    07/18/21 1544   BP: 134/69   Pulse: 97   Patient Position - Orthostatic VS: Sitting       Physical Exam  Vitals and nursing note reviewed  Constitutional:       General: She is not in acute distress  Appearance: Normal appearance  She is well-developed  She is obese  She is not ill-appearing, toxic-appearing or diaphoretic  Comments: Appears uncomfortable  HENT:      Head: Normocephalic and atraumatic  Mouth/Throat:      Mouth: Mucous membranes are moist       Pharynx: Oropharynx is clear  Eyes:      Conjunctiva/sclera: Conjunctivae normal       Pupils: Pupils are equal, round, and reactive to light  Cardiovascular:      Rate and Rhythm: Normal rate and regular rhythm  Pulses: Normal pulses  Heart sounds: Normal heart sounds  No murmur heard  No friction rub  No gallop  Pulmonary:      Effort: Pulmonary effort is normal  No respiratory distress  Breath sounds: Normal breath sounds  No decreased breath sounds, wheezing, rhonchi or rales  Abdominal:      General: Bowel sounds are normal  There is no distension  Palpations: Abdomen is soft  Tenderness: There is abdominal tenderness  There is no guarding or rebound        Comments: RUQ and epigastric tenderness  Musculoskeletal:         General: No tenderness  Cervical back: Neck supple  Right lower leg: No tenderness  No edema  Left lower leg: No tenderness  No edema  Skin:     General: Skin is warm and dry  Coloration: Skin is not pale  Findings: No erythema or rash  Neurological:      General: No focal deficit present  Mental Status: She is alert and oriented to person, place, and time  Cranial Nerves: No cranial nerve deficit  Sensory: No sensory deficit  Motor: No weakness     Psychiatric:         Mood and Affect: Mood normal          Behavior: Behavior normal          ED Medications  Medications   multi-electrolyte (ISOLYTE-S PH 7 4) bolus 1,000 mL (has no administration in time range)   ondansetron (ZOFRAN) injection 4 mg (has no administration in time range)   HYDROmorphone (DILAUDID) injection 0 5 mg (has no administration in time range)       Diagnostic Studies  Results Reviewed     Procedure Component Value Units Date/Time    CBC and differential [[id number]]     Lab Status: No result Specimen: Blood     Comprehensive metabolic panel [[id number]]     Lab Status: No result Specimen: Blood     Lipase [[id number]]     Lab Status: No result Specimen: Blood                  No orders to display         Procedures  POC Biliary US    Date/Time: 7/18/2021 5:00 PM  Performed by: Isabella Cleary MD  Authorized by: Isabella Cleary MD     Patient location:  ED  Performed by:  Resident  Other Assisting Provider: Yes (comment) (Dr Waylon Sood)    Procedure details:     Exam Type:  Diagnostic    Indications: upper right quadrant abdominal pain and epigastric pain      Assessment for:  Cholecystitis and cholelithiasis    Views obtained: gallbladder (transverse and longitudinal)      Image quality: limited diagnostic      Image availability:  Not saved  Findings:     Cholelithiasis: not identified      Common bile duct:  Unable to visualize    Gallbladder wall: Normal    Gallbladder wall thickness (mm):  2    Pericholecystic fluid: not identified      Sonographic Greene's sign: negative      Polyps: not identified      Mass: not identified    Interpretation:     Biliary ultrasound impressions: normal gallbladder ultrasound            ED Course                                       MDM     24-year-old female with past medical history of obesity who presents for evaluation of right upper quadrant abdominal pain for the past 3 days  Differential diagnosis includes: biliary disease, pancreatitis, nephrolithiasis, gastritis, GERD  Will order CBC, CMP, lipase, and UA  Will perform POCUS to look at [address]  Will give IVF bolus, zofran and dilaudid for symptoms  POCUS shows normal gallbladder with normal wall thickness, no pericholecystic fluid  Will order CT abd/pelvis with IV contrast to evaluate for other intra-abdominal pathology  Reviewed labs, CMP, CBC unremarkable  UA normal  Lipase normal  CT abd/pelvis negative for acute intra-abdominal pathology  Reassessed patient, pain is slightly improved  Will give GI cocktail with Maalox and pepcid and will give toradol  Will order formal RUQ US to further evaluate for cholecystitis as patient is still having moderate pain  RUQ US negative  Patient states pain has slightly improved  Discussed with patient that she should follow up with her PCP within 1-2 days  Discussed with patient strict return precautions  Discussed with patient to take pepcid two times daily for the next two weeks  Patient expressed understanding and was agreeable for discharge  Disposition  Final diagnoses:   None     ED Disposition     None      Follow-up Information    None         Patient's Medications   Discharge Prescriptions    No medications on file     No discharge procedures on file      PDMP Review       Value Time User    PDMP Reviewed  Yes 2/25/2021 10:54 AM Velasquez Gonzalez MD           ED Provider  Attending physically available and evaluated Sangeeta Urrutia I managed the patient along with the ED Attending      Electronically Signed by         Isabella Cleary MD  07/19/21 0411

## 2021-07-19 LAB
ATRIAL RATE: 75 BPM
P AXIS: 65 DEGREES
PR INTERVAL: 114 MS
QRS AXIS: 34 DEGREES
QRSD INTERVAL: 82 MS
QT INTERVAL: 374 MS
QTC INTERVAL: 417 MS
T WAVE AXIS: 66 DEGREES
VENTRICULAR RATE: 75 BPM

## 2021-07-19 PROCEDURE — 93010 ELECTROCARDIOGRAM REPORT: CPT | Performed by: INTERNAL MEDICINE

## 2021-07-20 ENCOUNTER — VBI (OUTPATIENT)
Dept: INTERNAL MEDICINE CLINIC | Facility: CLINIC | Age: 46
End: 2021-07-20

## 2021-07-20 NOTE — TELEPHONE ENCOUNTER
Robbin Allan    ED Visit Information     Ed visit date: 7/18/21  Diagnosis Description: Abdominal pain  In Network? Yes [facility]  Discharge status: Home  Discharged with meds ? No  Number of ED visits to date: 1  ED Severity:N/A     Outreach Information    Outreach successful: Yes 1  Date letter mailed:0  Date Finalized:7/20/21    Care Coordination    Follow up appointment with pcp: no Patient declined a ED follow up with PCP at this time  Transportation issues ? No    Value [employer] type: 3 Day Outreach  Emergent necessity warranted by diagnosis: Yes  [facility]CP: Yes  Transportation: Friend/Family Transport  Called PCP first?: No  Feels able to call PCP for urgent problems ?: Yes  Understands what emergencies can be handled by PCP ?: Yes  Ever any problems getting appointment with PCP for minor emergency/urgency problems?: No  Practice Contacted Patient ?: No  Pt had ED follow up with pcp/staff ?: No    Seen for follow-up out of network ?: No  Reason Patient went to ED instead of Urgent Care or PCP?: Perceived Severity of Illness  Urgent care Education?: Yes  07/20/2021 01:36 PM Phone (VBI) Maulik Reed (Self) [phone] (H) Remove  By [address] communication with patient regarding recent ED visit on 7/18/21  Patient stated that she is doing a little better, still having pain rating about 2/10 but states she was about a 10/10 when she went to the hospital  Patient declined PCP follow-up at this time but states she has an annual wellness visit on 8/24/21  Patient does not meet OPCM criteria  Patient is aware of PCP after hour's on-call service, education not given  Patient was aware of her nearest [place] [address] urgent care facility, education not given  Patient does feel comfortable contacting PCP office for medical advice and has never had to use a [facility] or next day appointment

## 2021-08-24 ENCOUNTER — TELEPHONE (OUTPATIENT)
Dept: OTHER | Facility: OTHER | Age: 46
End: 2021-08-24

## 2021-08-24 NOTE — TELEPHONE ENCOUNTER
The patient called and canceled her appointment for 8- due to insurance issues  The patient stated that the office called her and left a message stating that her insurance will not cover the appointment and the patient wanted to call her insurance  The patient will call back to reschedule

## 2021-09-02 ENCOUNTER — OFFICE VISIT (OUTPATIENT)
Dept: INTERNAL MEDICINE CLINIC | Facility: CLINIC | Age: 46
End: 2021-09-02
Payer: COMMERCIAL

## 2021-09-02 VITALS
BODY MASS INDEX: 48.96 KG/M2 | HEIGHT: 64 IN | TEMPERATURE: 98.6 F | DIASTOLIC BLOOD PRESSURE: 82 MMHG | HEART RATE: 85 BPM | WEIGHT: 286.8 LBS | OXYGEN SATURATION: 98 % | SYSTOLIC BLOOD PRESSURE: 120 MMHG

## 2021-09-02 DIAGNOSIS — F41.9 ANXIETY: ICD-10-CM

## 2021-09-02 DIAGNOSIS — Z11.59 NEED FOR HEPATITIS C SCREENING TEST: Primary | ICD-10-CM

## 2021-09-02 DIAGNOSIS — E66.01 CLASS 3 SEVERE OBESITY DUE TO EXCESS CALORIES WITHOUT SERIOUS COMORBIDITY WITH BODY MASS INDEX (BMI) OF 45.0 TO 49.9 IN ADULT (HCC): ICD-10-CM

## 2021-09-02 DIAGNOSIS — F33.9 EPISODE OF RECURRENT MAJOR DEPRESSIVE DISORDER, UNSPECIFIED DEPRESSION EPISODE SEVERITY (HCC): ICD-10-CM

## 2021-09-02 DIAGNOSIS — L72.0 EPIDERMAL INCLUSION CYST: ICD-10-CM

## 2021-09-02 DIAGNOSIS — K21.9 GASTROESOPHAGEAL REFLUX DISEASE WITHOUT ESOPHAGITIS: ICD-10-CM

## 2021-09-02 DIAGNOSIS — M79.645 PAIN OF LEFT THUMB: ICD-10-CM

## 2021-09-02 PROBLEM — M79.644 PAIN OF RIGHT THUMB: Status: ACTIVE | Noted: 2021-09-02

## 2021-09-02 PROBLEM — Z87.76 HISTORY OF SYNDACTYLY: Status: ACTIVE | Noted: 2021-09-02

## 2021-09-02 PROBLEM — Z87.768 HISTORY OF SYNDACTYLY: Status: ACTIVE | Noted: 2021-09-02

## 2021-09-02 PROBLEM — R06.02 SHORTNESS OF BREATH: Status: RESOLVED | Noted: 2021-02-24 | Resolved: 2021-09-02

## 2021-09-02 PROCEDURE — 3008F BODY MASS INDEX DOCD: CPT | Performed by: INTERNAL MEDICINE

## 2021-09-02 PROCEDURE — 99214 OFFICE O/P EST MOD 30 MIN: CPT | Performed by: INTERNAL MEDICINE

## 2021-09-02 PROCEDURE — 3725F SCREEN DEPRESSION PERFORMED: CPT | Performed by: INTERNAL MEDICINE

## 2021-09-02 PROCEDURE — 1036F TOBACCO NON-USER: CPT | Performed by: INTERNAL MEDICINE

## 2021-09-02 RX ORDER — LORAZEPAM 0.5 MG/1
1 TABLET ORAL
Qty: 90 TABLET | Refills: 1 | Status: SHIPPED | OUTPATIENT
Start: 2021-09-02 | End: 2022-02-07 | Stop reason: SDUPTHER

## 2021-09-02 RX ORDER — TRAZODONE HYDROCHLORIDE 50 MG/1
50 TABLET ORAL
Qty: 90 TABLET | Refills: 1 | Status: SHIPPED | OUTPATIENT
Start: 2021-09-02 | End: 2022-08-10

## 2021-09-02 RX ORDER — FAMOTIDINE 20 MG/1
20 TABLET, FILM COATED ORAL DAILY
Qty: 90 TABLET | Refills: 1 | Status: SHIPPED | OUTPATIENT
Start: 2021-09-02 | End: 2022-01-25 | Stop reason: HOSPADM

## 2021-09-02 RX ORDER — ESCITALOPRAM OXALATE 20 MG/1
20 TABLET ORAL DAILY
Qty: 30 TABLET | Refills: 5 | Status: SHIPPED | OUTPATIENT
Start: 2021-10-18 | End: 2022-05-25 | Stop reason: SDUPTHER

## 2021-09-02 RX ORDER — LORAZEPAM 0.5 MG/1
1 TABLET ORAL DAILY PRN
COMMUNITY
Start: 2019-01-01 | End: 2021-09-02 | Stop reason: SDUPTHER

## 2021-09-02 NOTE — LETTER
September 2, 2021    Dear Dr Madi Mina,    I would like to refer a patient to you, Ofelia Felty  Sorrachel  She would like to be considered for Michael-en-Y gastric bypass surgery  She has a history of morbid obesity  She does not have significant comorbidities but she has been unable to lose weight through various diet programs such as Medifast, Mindy Ready, Weight Watchers and through the use of appetite suppressants  In her younger years she was a competitive   Currently, she weighs 286+ lb on a 5 ft 3 frame with a BMI of 49 57  She is beginning to experience some discomfort in her knees  I certainly believe she has the discipline to do very well with Michael-en-Y gastric bypass  She is very motivated  Please evaluate and if I can be of any other assistance please let me know      Thank JERED Lopez

## 2021-09-02 NOTE — PROGRESS NOTES
Assessment/Plan:    Gastroesophageal reflux disease without esophagitis  Much better controlled with famotidine twice daily  Class 3 severe obesity without serious comorbidity with body mass index (BMI) of 45 0 to 49 9 in [facility])  Patient being referred to bariatric surgery    Recurrent major depressive disorder ([facility] [address] )  Lexapro was renewed  Anxiety  Lorazepam was renewed  Pain of right thumb  Discomfort of the D IP joint of the left thumb  Recommended conservative treatment for now with applications of Voltaren gel 3 to 4 times daily  If no improvement will consider x-rays    Epidermal inclusion cyst  Clinically stable and healed epidermal inclusion cyst   Currently no surgical intervention is necessary       Diagnoses and all orders for this visit:    Need for hepatitis C screening test  -     Hepatitis C antibody; Future    Class 3 severe obesity due to excess calories without serious comorbidity with body mass index (BMI) of 45 0 to 49 9 in [facility])  -     Ambulatory referral to Bariatric Surgery; Future    Anxiety  -     LORazepam (ATIVAN) 0 5 mg tablet; Take 2 tablets (1 mg total) by mouth daily at bedtime  -     escitalopram (LEXAPRO) 20 mg tablet; Take 1 tablet (20 mg total) by mouth daily    Gastroesophageal reflux disease without esophagitis  -     famotidine (PEPCID) 20 mg tablet; Take 1 tablet (20 mg total) by mouth daily    Episode of recurrent major depressive disorder, unspecified depression episode severity (HCC)  -     traZODone (DESYREL) 50 mg tablet; Take 1 tablet (50 mg total) by mouth daily at bedtime    Pain of right thumb    Epidermal inclusion cyst    Other orders  -     Discontinue: LORazepam (ATIVAN) 0 5 mg tablet; Take 1 mg by mouth daily as needed           Subjective:      Patient ID: Emory Bales is a 50 y o  female  40-year-old female presents to the office for a follow-up for her obesity    She also has some smaller issues regarding some minor discomforts in her right thumb  A small skin lesion on the left side of her neck and some mild knee discomfort  He her main concern and complaint is that she would like to have a referral to bariatric surgery for consideration of gastric bypass surgery her weight continues to increase despite her multiple interventions with diet plans which have been unsuccessful  She has been on some appetite suppressant medications which have been unsuccessful as well  In her younger years she was a competitive  and would like to somehow regain part and that  She is beginning to experience some discomfort in her knees particularly her right knee and realizes that hip if she does not do something drastic about her weight she is going to develop arthritis and this may lead to further immobility which she does not want to deal with if she can avoid she is walks numerous videos regarding bypass surgery and feels that Michael-en-Y bypass would probably be the best option for her  She seems to be fully prepared to make the lifestyle changes necessary for this surgery to be successful and states that she has had several acquaintances who have gone through the operation with great success so she may have a very good support group  She is recovering from [facility] earlier this year  She was followed by pulmonology for brief period of time but she has since recovered  She does experience some time to time brain fog but feels that she is almost over that as well  She was recently seen in the emergency room in mid July for right upper quadrant abdominal pain  Clinically it appears as if she presented like an acute cholecystitis however ultrasound of her gallbladder was unremarkable and her labs only revealed a mild leukocytosis all other testing was normal   Symptoms resolved spontaneously and she has had no recurrence        Family History   Problem Relation Age of Onset    Cancer Mother     Cancer Father      Social History Socioeconomic History    Marital status:      Spouse name: Not on file    Number of children: Not on file    Years of education: Not on file    Highest education level: Not on file   Occupational History    Not on file   Tobacco Use    Smoking status: Former Smoker     Packs/day: 1 00     Years: 10 00     Pack years: 10 00     Types: Cigarettes     Quit date:      Years since quittin 6    Smokeless tobacco: Never Used   Vaping Use    Vaping Use: Never used   Substance and Sexual Activity    Alcohol use: Not Currently     Comment: social    Drug use: No    Sexual activity: Not Currently   Other Topics Concern    Not on file   Social History Narrative    Not on file     Social Determinants of Health     Financial Resource Strain:     Difficulty of Paying Living Expenses:    Food Insecurity:     Worried About [address] in the Last Year:     [address] in the Last Year:    Transportation Needs:     Lack of Transportation (Medical):      Lack of Transportation (Non-Medical):    Physical Activity:     Days of Exercise per Week:     Minutes of Exercise per Session:    Stress:     Feeling of Stress :    Social Connections:     Frequency of Communication with Friends and Family:     Frequency of Social Gatherings with Friends and Family:     Attends Muslim Services:     Active Member of Clubs or Organizations:     Attends Club or Organization Meetings:     Marital Status:    Intimate Partner Violence:     Fear of Current or Ex-Partner:     Emotionally Abused:     Physically Abused:     Sexually Abused:      Past Medical History:   Diagnosis Date    Psychiatric disorder        Current Outpatient Medications:     [START ON 10/18/2021] escitalopram (LEXAPRO) 20 mg tablet, Take 1 tablet (20 mg total) by mouth daily, Disp: 30 tablet, Rfl: 5    famotidine (PEPCID) 20 mg tablet, Take 1 tablet (20 mg total) by mouth daily, Disp: 90 tablet, Rfl: 1    ibuprofen (MOTRIN) 200 mg tablet, Take 600 mg by mouth as needed , Disp: , Rfl:     LORazepam (ATIVAN) 0 5 mg tablet, Take 2 tablets (1 mg total) by mouth daily at bedtime, Disp: 90 tablet, Rfl: 1    traZODone (DESYREL) 50 mg tablet, Take 1 tablet (50 mg total) by mouth daily at bedtime, Disp: 90 tablet, Rfl: 1    valACYclovir (VALTREX) 500 mg tablet, Take 1 tablet (500 mg total) by mouth daily, Disp: 90 tablet, Rfl: 1  Allergies   Allergen Reactions    Duricef [Cefadroxil] Anaphylaxis    Penicillins Anaphylaxis     Past Surgical History:   Procedure Laterality Date     SECTION      ENDOMETRIAL ABLATION      HAND SURGERY      OVARIAN CYST DRAINAGE      WRIST SURGERY           Review of Systems   Constitutional: Negative  HENT: Negative  Eyes: Negative  Respiratory: Negative  Cardiovascular: Negative  Gastrointestinal: Negative  Endocrine: Negative  Genitourinary: Negative  Musculoskeletal: Positive for arthralgias (Knee pain, left thumb pain)  Skin: Negative  Allergic/Immunologic: Negative  Neurological: Negative  Hematological: Negative  Psychiatric/Behavioral: Positive for decreased concentration and sleep disturbance  Objective:      /82   Pulse 85   Temp 98 6 °F (37 °C) (Tympanic)   Ht 5' 3 78" (1 62 m)   Wt 130 kg (286 lb 12 8 oz)   SpO2 98%   BMI 49 57 kg/m²          Physical Exam  Constitutional:       General: She is not in acute distress  Appearance: She is obese  She is not ill-appearing, toxic-appearing or diaphoretic  HENT:      Head: Normocephalic and atraumatic  Right Ear: External ear normal       Left Ear: External ear normal    Eyes:      General: No scleral icterus  Extraocular Movements: Extraocular movements intact  Conjunctiva/sclera: Conjunctivae normal       Pupils: Pupils are equal, round, and reactive to light  Cardiovascular:      Rate and Rhythm: Normal rate and regular rhythm        Heart sounds: Normal heart sounds  No murmur heard  Pulmonary:      Effort: Pulmonary effort is normal  No respiratory distress  Breath sounds: Normal breath sounds  Abdominal:      Tenderness: There is no abdominal tenderness  Comments: Obese   Musculoskeletal:         General: Tenderness (D IP joint of the left thumb  No palpable abnormality) present  Cervical back: Normal range of motion and neck supple  Right lower leg: No edema  Left lower leg: No edema  Skin:     General: Skin is warm  Coloration: Skin is not jaundiced  Findings: No bruising, erythema or rash  Neurological:      General: No focal deficit present  Mental Status: She is alert and oriented to person, place, and time  Mental status is at baseline     Psychiatric:         Mood and Affect: Mood normal          Behavior: Behavior normal

## 2021-09-02 NOTE — ASSESSMENT & PLAN NOTE
Discomfort of the D IP joint of the left thumb  Recommended conservative treatment for now with applications of Voltaren gel 3 to 4 times daily    If no improvement will consider x-rays

## 2021-09-13 ENCOUNTER — APPOINTMENT (OUTPATIENT)
Dept: LAB | Age: 46
End: 2021-09-13
Payer: COMMERCIAL

## 2021-09-13 ENCOUNTER — HOSPITAL ENCOUNTER (OUTPATIENT)
Dept: RADIOLOGY | Age: 46
Discharge: HOME | End: 2021-09-13
Payer: COMMERCIAL

## 2021-09-13 VITALS — WEIGHT: 280 LBS | HEIGHT: 64 IN | BODY MASS INDEX: 47.8 KG/M2

## 2021-09-13 DIAGNOSIS — Z12.31 ENCOUNTER FOR SCREENING MAMMOGRAM FOR MALIGNANT NEOPLASM OF BREAST: ICD-10-CM

## 2021-09-13 DIAGNOSIS — Z11.59 NEED FOR HEPATITIS C SCREENING TEST: ICD-10-CM

## 2021-09-13 LAB — HCV AB SER QL: NORMAL

## 2021-09-13 PROCEDURE — 36415 COLL VENOUS BLD VENIPUNCTURE: CPT

## 2021-09-13 PROCEDURE — 77063 BREAST TOMOSYNTHESIS BI: CPT

## 2021-09-13 PROCEDURE — 77067 SCR MAMMO BI INCL CAD: CPT

## 2021-09-13 PROCEDURE — 86803 HEPATITIS C AB TEST: CPT

## 2021-10-05 ENCOUNTER — CLINICAL SUPPORT (OUTPATIENT)
Dept: BARIATRICS | Facility: CLINIC | Age: 46
End: 2021-10-05

## 2021-10-05 ENCOUNTER — APPOINTMENT (OUTPATIENT)
Dept: LAB | Age: 46
End: 2021-10-05
Payer: COMMERCIAL

## 2021-10-05 VITALS
WEIGHT: 280 LBS | BODY MASS INDEX: 49.61 KG/M2 | DIASTOLIC BLOOD PRESSURE: 86 MMHG | SYSTOLIC BLOOD PRESSURE: 120 MMHG | HEART RATE: 87 BPM | TEMPERATURE: 98.4 F | HEIGHT: 63 IN

## 2021-10-05 DIAGNOSIS — E66.01 MORBID OBESITY (HCC): Primary | ICD-10-CM

## 2021-10-05 DIAGNOSIS — E66.01 CLASS 3 SEVERE OBESITY DUE TO EXCESS CALORIES WITHOUT SERIOUS COMORBIDITY WITH BODY MASS INDEX (BMI) OF 45.0 TO 49.9 IN ADULT (HCC): ICD-10-CM

## 2021-10-05 DIAGNOSIS — Z01.812 PRE-PROCEDURAL LABORATORY EXAMINATION: ICD-10-CM

## 2021-10-05 DIAGNOSIS — Z01.812 PRE-PROCEDURAL LABORATORY EXAMINATION: Primary | ICD-10-CM

## 2021-10-05 LAB
ALBUMIN SERPL BCP-MCNC: 3.5 G/DL (ref 3.5–5)
ALP SERPL-CCNC: 57 U/L (ref 46–116)
ALT SERPL W P-5'-P-CCNC: 37 U/L (ref 12–78)
ANION GAP SERPL CALCULATED.3IONS-SCNC: 3 MMOL/L (ref 4–13)
AST SERPL W P-5'-P-CCNC: 20 U/L (ref 5–45)
BASOPHILS # BLD AUTO: 0.05 THOUSANDS/ΜL (ref 0–0.1)
BASOPHILS NFR BLD AUTO: 1 % (ref 0–1)
BILIRUB SERPL-MCNC: 0.4 MG/DL (ref 0.2–1)
BUN SERPL-MCNC: 18 MG/DL (ref 5–25)
CALCIUM SERPL-MCNC: 9.1 MG/DL (ref 8.3–10.1)
CHLORIDE SERPL-SCNC: 103 MMOL/L (ref 100–108)
CHOLEST SERPL-MCNC: 201 MG/DL (ref 50–200)
CO2 SERPL-SCNC: 29 MMOL/L (ref 21–32)
CREAT SERPL-MCNC: 0.64 MG/DL (ref 0.6–1.3)
EOSINOPHIL # BLD AUTO: 0.15 THOUSAND/ΜL (ref 0–0.61)
EOSINOPHIL NFR BLD AUTO: 2 % (ref 0–6)
ERYTHROCYTE [DISTWIDTH] IN BLOOD BY AUTOMATED COUNT: 12 % (ref 11.6–15.1)
GFR SERPL CREATININE-BSD FRML MDRD: 107 ML/MIN/1.73SQ M
GLUCOSE P FAST SERPL-MCNC: 91 MG/DL (ref 65–99)
HCT VFR BLD AUTO: 44.1 % (ref 34.8–46.1)
HDLC SERPL-MCNC: 34 MG/DL
HGB BLD-MCNC: 14.3 G/DL (ref 11.5–15.4)
IMM GRANULOCYTES # BLD AUTO: 0.02 THOUSAND/UL (ref 0–0.2)
IMM GRANULOCYTES NFR BLD AUTO: 0 % (ref 0–2)
LDLC SERPL CALC-MCNC: 134 MG/DL (ref 0–100)
LYMPHOCYTES # BLD AUTO: 1.85 THOUSANDS/ΜL (ref 0.6–4.47)
LYMPHOCYTES NFR BLD AUTO: 20 % (ref 14–44)
MCH RBC QN AUTO: 30.2 PG (ref 26.8–34.3)
MCHC RBC AUTO-ENTMCNC: 32.4 G/DL (ref 31.4–37.4)
MCV RBC AUTO: 93 FL (ref 82–98)
MONOCYTES # BLD AUTO: 0.6 THOUSAND/ΜL (ref 0.17–1.22)
MONOCYTES NFR BLD AUTO: 7 % (ref 4–12)
NEUTROPHILS # BLD AUTO: 6.49 THOUSANDS/ΜL (ref 1.85–7.62)
NEUTS SEG NFR BLD AUTO: 70 % (ref 43–75)
NRBC BLD AUTO-RTO: 0 /100 WBCS
PLATELET # BLD AUTO: 289 THOUSANDS/UL (ref 149–390)
PMV BLD AUTO: 10.8 FL (ref 8.9–12.7)
POTASSIUM SERPL-SCNC: 3.9 MMOL/L (ref 3.5–5.3)
PROT SERPL-MCNC: 7.4 G/DL (ref 6.4–8.2)
RBC # BLD AUTO: 4.73 MILLION/UL (ref 3.81–5.12)
SODIUM SERPL-SCNC: 135 MMOL/L (ref 136–145)
TRIGL SERPL-MCNC: 164 MG/DL
TSH SERPL DL<=0.05 MIU/L-ACNC: 0.83 UIU/ML (ref 0.36–3.74)
WBC # BLD AUTO: 9.16 THOUSAND/UL (ref 4.31–10.16)

## 2021-10-05 PROCEDURE — 85025 COMPLETE CBC W/AUTO DIFF WBC: CPT

## 2021-10-05 PROCEDURE — 80061 LIPID PANEL: CPT

## 2021-10-05 PROCEDURE — 80053 COMPREHEN METABOLIC PANEL: CPT

## 2021-10-05 PROCEDURE — RECHECK

## 2021-10-05 PROCEDURE — 36415 COLL VENOUS BLD VENIPUNCTURE: CPT

## 2021-10-05 PROCEDURE — 84443 ASSAY THYROID STIM HORMONE: CPT

## 2021-10-07 ENCOUNTER — IMMUNIZATIONS (OUTPATIENT)
Dept: FAMILY MEDICINE CLINIC | Facility: HOSPITAL | Age: 46
End: 2021-10-07

## 2021-10-07 DIAGNOSIS — Z23 ENCOUNTER FOR IMMUNIZATION: Primary | ICD-10-CM

## 2021-10-07 PROCEDURE — 0001A SARS-COV-2 / COVID-19 MRNA VACCINE (PFIZER-BIONTECH) 30 MCG: CPT

## 2021-10-07 PROCEDURE — 91300 SARS-COV-2 / COVID-19 MRNA VACCINE (PFIZER-BIONTECH) 30 MCG: CPT

## 2021-10-13 ENCOUNTER — OFFICE VISIT (OUTPATIENT)
Dept: PULMONOLOGY | Facility: CLINIC | Age: 46
End: 2021-10-13
Payer: COMMERCIAL

## 2021-10-13 VITALS
WEIGHT: 273 LBS | DIASTOLIC BLOOD PRESSURE: 80 MMHG | HEIGHT: 63 IN | SYSTOLIC BLOOD PRESSURE: 118 MMHG | HEART RATE: 70 BPM | TEMPERATURE: 98.8 F | BODY MASS INDEX: 48.37 KG/M2 | OXYGEN SATURATION: 95 %

## 2021-10-13 DIAGNOSIS — E66.01 CLASS 3 SEVERE OBESITY DUE TO EXCESS CALORIES WITHOUT SERIOUS COMORBIDITY WITH BODY MASS INDEX (BMI) OF 45.0 TO 49.9 IN ADULT (HCC): ICD-10-CM

## 2021-10-13 DIAGNOSIS — K21.9 GASTROESOPHAGEAL REFLUX DISEASE WITHOUT ESOPHAGITIS: ICD-10-CM

## 2021-10-13 DIAGNOSIS — R91.8 GROUND GLASS OPACITY PRESENT ON IMAGING OF LUNG: ICD-10-CM

## 2021-10-13 DIAGNOSIS — Z87.891 HISTORY OF TOBACCO ABUSE: ICD-10-CM

## 2021-10-13 DIAGNOSIS — R59.0 MEDIASTINAL LYMPHADENOPATHY: Primary | ICD-10-CM

## 2021-10-13 PROCEDURE — 99214 OFFICE O/P EST MOD 30 MIN: CPT | Performed by: INTERNAL MEDICINE

## 2021-10-13 PROCEDURE — 3008F BODY MASS INDEX DOCD: CPT | Performed by: INTERNAL MEDICINE

## 2021-10-13 PROCEDURE — 1036F TOBACCO NON-USER: CPT | Performed by: INTERNAL MEDICINE

## 2021-11-02 ENCOUNTER — CONSULT (OUTPATIENT)
Dept: CARDIOLOGY CLINIC | Facility: CLINIC | Age: 46
End: 2021-11-02
Payer: COMMERCIAL

## 2021-11-02 VITALS
BODY MASS INDEX: 49.1 KG/M2 | HEIGHT: 63 IN | WEIGHT: 277.1 LBS | HEART RATE: 73 BPM | DIASTOLIC BLOOD PRESSURE: 84 MMHG | OXYGEN SATURATION: 97 % | SYSTOLIC BLOOD PRESSURE: 120 MMHG

## 2021-11-02 DIAGNOSIS — Z01.818 PRE-OP EXAM: Primary | ICD-10-CM

## 2021-11-02 DIAGNOSIS — E66.01 MORBID OBESITY (HCC): ICD-10-CM

## 2021-11-02 PROCEDURE — 99203 OFFICE O/P NEW LOW 30 MIN: CPT | Performed by: INTERNAL MEDICINE

## 2021-11-02 PROCEDURE — 93000 ELECTROCARDIOGRAM COMPLETE: CPT | Performed by: INTERNAL MEDICINE

## 2021-11-02 RX ORDER — DIPHENOXYLATE HYDROCHLORIDE AND ATROPINE SULFATE 2.5; .025 MG/1; MG/1
1 TABLET ORAL DAILY
COMMUNITY

## 2021-11-10 ENCOUNTER — OFFICE VISIT (OUTPATIENT)
Dept: BARIATRICS | Facility: CLINIC | Age: 46
End: 2021-11-10
Payer: COMMERCIAL

## 2021-11-10 VITALS
HEART RATE: 100 BPM | BODY MASS INDEX: 48.64 KG/M2 | SYSTOLIC BLOOD PRESSURE: 120 MMHG | WEIGHT: 274.5 LBS | TEMPERATURE: 97 F | HEIGHT: 63 IN | DIASTOLIC BLOOD PRESSURE: 78 MMHG

## 2021-11-10 DIAGNOSIS — F41.9 ANXIETY: ICD-10-CM

## 2021-11-10 DIAGNOSIS — E66.01 CLASS 3 SEVERE OBESITY DUE TO EXCESS CALORIES WITHOUT SERIOUS COMORBIDITY WITH BODY MASS INDEX (BMI) OF 45.0 TO 49.9 IN ADULT (HCC): Primary | ICD-10-CM

## 2021-11-10 DIAGNOSIS — K21.9 GASTROESOPHAGEAL REFLUX DISEASE WITHOUT ESOPHAGITIS: ICD-10-CM

## 2021-11-10 DIAGNOSIS — Z87.891 HISTORY OF TOBACCO ABUSE: ICD-10-CM

## 2021-11-10 DIAGNOSIS — E78.5 HYPERLIPIDEMIA, UNSPECIFIED HYPERLIPIDEMIA TYPE: ICD-10-CM

## 2021-11-10 PROCEDURE — 99204 OFFICE O/P NEW MOD 45 MIN: CPT | Performed by: SURGERY

## 2021-11-10 PROCEDURE — 1036F TOBACCO NON-USER: CPT | Performed by: SURGERY

## 2021-11-10 PROCEDURE — 3008F BODY MASS INDEX DOCD: CPT | Performed by: SURGERY

## 2021-11-19 ENCOUNTER — PREP FOR PROCEDURE (OUTPATIENT)
Dept: BARIATRICS | Facility: CLINIC | Age: 46
End: 2021-11-19

## 2021-11-19 DIAGNOSIS — E66.01 CLASS 3 SEVERE OBESITY DUE TO EXCESS CALORIES WITHOUT SERIOUS COMORBIDITY WITH BODY MASS INDEX (BMI) OF 45.0 TO 49.9 IN ADULT (HCC): Primary | ICD-10-CM

## 2021-12-07 DIAGNOSIS — Z86.19 HISTORY OF COLD SORES: ICD-10-CM

## 2021-12-07 RX ORDER — VALACYCLOVIR HYDROCHLORIDE 500 MG/1
500 TABLET, FILM COATED ORAL EVERY OTHER DAY
Qty: 45 TABLET | Refills: 1 | Status: SHIPPED | OUTPATIENT
Start: 2021-12-07 | End: 2022-05-25 | Stop reason: SDUPTHER

## 2021-12-15 ENCOUNTER — OFFICE VISIT (OUTPATIENT)
Dept: BARIATRICS | Facility: CLINIC | Age: 46
End: 2021-12-15

## 2021-12-15 VITALS — BODY MASS INDEX: 47.79 KG/M2 | WEIGHT: 269.8 LBS

## 2021-12-15 DIAGNOSIS — E66.01 CLASS 3 SEVERE OBESITY DUE TO EXCESS CALORIES WITHOUT SERIOUS COMORBIDITY WITH BODY MASS INDEX (BMI) OF 45.0 TO 49.9 IN ADULT (HCC): Primary | ICD-10-CM

## 2021-12-15 PROCEDURE — RECHECK

## 2021-12-28 ENCOUNTER — TELEPHONE (OUTPATIENT)
Dept: GASTROENTEROLOGY | Facility: HOSPITAL | Age: 46
End: 2021-12-28

## 2021-12-29 ENCOUNTER — ANESTHESIA EVENT (OUTPATIENT)
Dept: GASTROENTEROLOGY | Facility: HOSPITAL | Age: 46
End: 2021-12-29

## 2021-12-29 ENCOUNTER — HOSPITAL ENCOUNTER (OUTPATIENT)
Dept: GASTROENTEROLOGY | Facility: HOSPITAL | Age: 46
Setting detail: OUTPATIENT SURGERY
Discharge: HOME | End: 2021-12-29
Attending: SURGERY | Admitting: SURGERY
Payer: COMMERCIAL

## 2021-12-29 ENCOUNTER — ANESTHESIA (OUTPATIENT)
Dept: GASTROENTEROLOGY | Facility: HOSPITAL | Age: 46
End: 2021-12-29

## 2021-12-29 VITALS
WEIGHT: 270 LBS | HEIGHT: 64 IN | RESPIRATION RATE: 19 BRPM | SYSTOLIC BLOOD PRESSURE: 112 MMHG | DIASTOLIC BLOOD PRESSURE: 81 MMHG | OXYGEN SATURATION: 93 % | BODY MASS INDEX: 46.1 KG/M2 | TEMPERATURE: 98.3 F | HEART RATE: 86 BPM

## 2021-12-29 DIAGNOSIS — E66.01 CLASS 3 SEVERE OBESITY DUE TO EXCESS CALORIES WITHOUT SERIOUS COMORBIDITY WITH BODY MASS INDEX (BMI) OF 45.0 TO 49.9 IN ADULT (HCC): ICD-10-CM

## 2021-12-29 PROCEDURE — 88305 TISSUE EXAM BY PATHOLOGIST: CPT | Performed by: PATHOLOGY

## 2021-12-29 PROCEDURE — 43239 EGD BIOPSY SINGLE/MULTIPLE: CPT | Performed by: SURGERY

## 2021-12-29 RX ORDER — SODIUM CHLORIDE 9 MG/ML
125 INJECTION, SOLUTION INTRAVENOUS CONTINUOUS
Status: DISCONTINUED | OUTPATIENT
Start: 2021-12-29 | End: 2022-01-02 | Stop reason: HOSPADM

## 2021-12-29 RX ORDER — PROPOFOL 10 MG/ML
INJECTION, EMULSION INTRAVENOUS AS NEEDED
Status: DISCONTINUED | OUTPATIENT
Start: 2021-12-29 | End: 2021-12-29

## 2021-12-29 RX ORDER — LIDOCAINE HYDROCHLORIDE 20 MG/ML
INJECTION, SOLUTION EPIDURAL; INFILTRATION; INTRACAUDAL; PERINEURAL AS NEEDED
Status: DISCONTINUED | OUTPATIENT
Start: 2021-12-29 | End: 2021-12-29

## 2021-12-29 RX ADMIN — SODIUM CHLORIDE 125 ML/HR: 0.9 INJECTION, SOLUTION INTRAVENOUS at 10:26

## 2021-12-29 RX ADMIN — LIDOCAINE HYDROCHLORIDE 100 MG: 20 INJECTION, SOLUTION EPIDURAL; INFILTRATION; INTRACAUDAL; PERINEURAL at 11:09

## 2021-12-29 RX ADMIN — PROPOFOL 150 MG: 10 INJECTION, EMULSION INTRAVENOUS at 11:09

## 2021-12-29 RX ADMIN — PROPOFOL 50 MG: 10 INJECTION, EMULSION INTRAVENOUS at 11:12

## 2021-12-29 NOTE — H&P
This is a 48 y o  female with a history of morbid obesity and Body mass index is 46 35 kg/m²  Here for an EGD to evaluate the anatomy of the GI tract  Physical Exam    /76   Pulse 80   Temp 98 3 °F (36 8 °C) (Temporal)   Resp 16   Ht 5' 4" (1 626 m)   Wt 122 kg (270 lb)   SpO2 98%   BMI 46 35 kg/m²    AAOx3  RRR  CTA B  Abdomen obese  Benign  A/P:    This is a 48 y o  female with a history of morbid obesity and Body mass index is 46 35 kg/m²       Will proceed with the EGD and biopsies        Rhys Allen MD  12/29/21  10:47 AM

## 2022-01-04 ENCOUNTER — PREP FOR PROCEDURE (OUTPATIENT)
Dept: BARIATRICS | Facility: CLINIC | Age: 47
End: 2022-01-04

## 2022-01-04 ENCOUNTER — TELEPHONE (OUTPATIENT)
Dept: BARIATRICS | Facility: CLINIC | Age: 47
End: 2022-01-04

## 2022-01-04 DIAGNOSIS — K21.9 GASTROESOPHAGEAL REFLUX DISEASE, UNSPECIFIED WHETHER ESOPHAGITIS PRESENT: ICD-10-CM

## 2022-01-04 DIAGNOSIS — E78.5 HYPERLIPIDEMIA, UNSPECIFIED HYPERLIPIDEMIA TYPE: ICD-10-CM

## 2022-01-04 DIAGNOSIS — E66.01 MORBID OBESITY (HCC): Primary | ICD-10-CM

## 2022-01-04 NOTE — TELEPHONE ENCOUNTER
Called pt per [employer] coordinator request to review pre-op liver shrinking diet  Pt reports she is currently drinking one premier protein drink per day  Discussed with pt increase to four meal replacement drinks per day, 80 ounces calorie-free drinks, 2 cups nonstarchy vegetables  Pt understands that the diet needs to be followed for 2 weeks prior to surgery  Pt will start on January 11th  Handout emailed to pt  Emphasized the need to drink 80 ounces of fluid per day while on the diet

## 2022-01-10 ENCOUNTER — TELEPHONE (OUTPATIENT)
Dept: BARIATRICS | Facility: CLINIC | Age: 47
End: 2022-01-10

## 2022-01-10 ENCOUNTER — TELEPHONE (OUTPATIENT)
Dept: INTERNAL MEDICINE CLINIC | Facility: CLINIC | Age: 47
End: 2022-01-10

## 2022-01-10 NOTE — TELEPHONE ENCOUNTER
Patient is having Gastric By pass on 01/24/2022 and was told to f/u in 2 weeks due to her not able to drive  Made an appt with Dr Amaury Chowdary on 02/07/2022 at 11:30 a m

## 2022-01-10 NOTE — TELEPHONE ENCOUNTER
Pt called with questions regarding pre-op liquid diet  Pt is starting today  Reviewed pre-op liver shrinking diet in detail with pt  All questions answered

## 2022-01-17 RX ORDER — LORAZEPAM 0.5 MG/1
TABLET ORAL
COMMUNITY
Start: 2021-12-04 | End: 2022-01-17

## 2022-01-17 RX ORDER — ACETAMINOPHEN 500 MG
500 TABLET ORAL EVERY 6 HOURS PRN
COMMUNITY
End: 2022-01-25 | Stop reason: HOSPADM

## 2022-01-17 NOTE — PRE-PROCEDURE INSTRUCTIONS
Pre-Surgery Instructions:   Medication Instructions    acetaminophen (TYLENOL) 500 mg tablet Instructed patient per Anesthesia Guidelines   CALCIUM CITRATE PO Patient was instructed by Physician and understands   Cholecalciferol (VITAMIN D3 PO) Instructed patient per Anesthesia Guidelines   Collagen-Vitamin C (COLLAGEN PLUS VITAMIN C PO) Instructed patient per Anesthesia Guidelines   escitalopram (LEXAPRO) 20 mg tablet Instructed patient per Anesthesia Guidelines   famotidine (PEPCID) 20 mg tablet Instructed patient per Anesthesia Guidelines  [address] [facility] [address] [facility] Patient was instructed by Physician and understands   LORazepam (ATIVAN) 0 5 mg tablet Instructed patient per Anesthesia Guidelines   Misc Natural Products (GLUCOSAMINE CHONDROITIN TRIPLE PO) Instructed patient per Anesthesia Guidelines   multivitamin [facility]) TABS Patient was instructed by Physician and understands   Probiotic Product (PRO-BIOTIC BLEND PO) Patient was instructed by Physician and understands   traZODone (DESYREL) 50 mg tablet Instructed patient per Anesthesia Guidelines   valACYclovir (VALTREX) 500 mg tablet Instructed patient per Anesthesia Guidelines  Patient   instructed to take*lexapro and if valtrex due to take that day with a sip of water the morning of surgery if needed lorazepam and tylenol  Patient  instructed on use of chlorhexidine soap per hospital protocol    Patient instructed to stop all ASA, NSAIDS, vitamins and herbal supplements one week prior to surgery or per Dr Juan David Ayala

## 2022-01-20 ENCOUNTER — OFFICE VISIT (OUTPATIENT)
Dept: BARIATRICS | Facility: CLINIC | Age: 47
End: 2022-01-20
Payer: COMMERCIAL

## 2022-01-20 ENCOUNTER — CLINICAL SUPPORT (OUTPATIENT)
Dept: BARIATRICS | Facility: CLINIC | Age: 47
End: 2022-01-20

## 2022-01-20 VITALS
DIASTOLIC BLOOD PRESSURE: 86 MMHG | BODY MASS INDEX: 47.22 KG/M2 | HEIGHT: 63 IN | HEART RATE: 91 BPM | TEMPERATURE: 99.4 F | WEIGHT: 266.5 LBS | SYSTOLIC BLOOD PRESSURE: 122 MMHG

## 2022-01-20 DIAGNOSIS — E78.5 HYPERLIPIDEMIA, UNSPECIFIED HYPERLIPIDEMIA TYPE: ICD-10-CM

## 2022-01-20 DIAGNOSIS — E66.01 CLASS 3 SEVERE OBESITY DUE TO EXCESS CALORIES WITHOUT SERIOUS COMORBIDITY WITH BODY MASS INDEX (BMI) OF 45.0 TO 49.9 IN ADULT (HCC): Primary | ICD-10-CM

## 2022-01-20 DIAGNOSIS — F41.9 ANXIETY: ICD-10-CM

## 2022-01-20 DIAGNOSIS — K21.9 GASTROESOPHAGEAL REFLUX DISEASE WITHOUT ESOPHAGITIS: ICD-10-CM

## 2022-01-20 PROCEDURE — 3008F BODY MASS INDEX DOCD: CPT | Performed by: SURGERY

## 2022-01-20 PROCEDURE — 99213 OFFICE O/P EST LOW 20 MIN: CPT | Performed by: SURGERY

## 2022-01-20 PROCEDURE — RECHECK: Performed by: DIETITIAN, REGISTERED

## 2022-01-20 PROCEDURE — 1036F TOBACCO NON-USER: CPT | Performed by: SURGERY

## 2022-01-20 RX ORDER — GABAPENTIN 300 MG/1
300 CAPSULE ORAL ONCE
Status: CANCELLED | OUTPATIENT
Start: 2022-01-24 | End: 2022-01-20

## 2022-01-20 RX ORDER — METRONIDAZOLE 500 MG/1
500 TABLET ORAL EVERY 8 HOURS SCHEDULED
Status: CANCELLED | OUTPATIENT
Start: 2022-01-24

## 2022-01-20 RX ORDER — HEPARIN SODIUM 5000 [USP'U]/ML
5000 INJECTION, SOLUTION INTRAVENOUS; SUBCUTANEOUS
Status: CANCELLED | OUTPATIENT
Start: 2022-01-24 | End: 2022-01-25

## 2022-01-20 RX ORDER — CELECOXIB 200 MG/1
200 CAPSULE ORAL ONCE
Status: CANCELLED | OUTPATIENT
Start: 2022-01-24 | End: 2022-01-20

## 2022-01-20 RX ORDER — LEVOFLOXACIN 5 MG/ML
750 INJECTION, SOLUTION INTRAVENOUS ONCE
Status: CANCELLED | OUTPATIENT
Start: 2022-01-24 | End: 2022-01-20

## 2022-01-20 RX ORDER — SCOLOPAMINE TRANSDERMAL SYSTEM 1 MG/1
1 PATCH, EXTENDED RELEASE TRANSDERMAL ONCE
Status: CANCELLED | OUTPATIENT
Start: 2022-01-24 | End: 2022-01-20

## 2022-01-20 RX ORDER — POLYETHYLENE GLYCOL 3350 17 G/17G
17 POWDER, FOR SOLUTION ORAL EVERY OTHER DAY
COMMUNITY
End: 2022-07-20

## 2022-01-20 NOTE — H&P (VIEW-ONLY)
BARIATRIC H&P - BARIATRIC SURGERY  Kim Herr 48 y o  female MRN: [id number]  Unit/Bed#:  Encounter: [id number]      HPI:  Kim Herr is a 48 y o  female who presents with a long-standing history of morbid obesity  She was found to be a good candidate to undergo a bariatric operation upon [facility]  She is here today to discuss details of her surgery  Review of Systems   All other systems reviewed and are negative        Historical Information   Past Medical History:   Diagnosis Date    Anxiety     Depression     Exercise involving walking     30 min daily and light weights    Fatty liver     GERD (gastroesophageal reflux disease)     Heart murmur     "from birth"    Hiatal hernia     History of COVID-19 2020    recovered at home, moderate symptoms    History of pneumonia     History of UTI     "in  and polynephritis"    History of      x2 after 1st C section    Motion sickness     PONV (postoperative nausea and vomiting)     Psychiatric disorder     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    Wears glasses     reading     Past Surgical History:   Procedure Laterality Date    BREAST BIOPSY Right 2004    titanium clip implanted     SECTION      x1    DENTAL IMPLANT      2 lower right    EGD      ENDOMETRIAL ABLATION  2007    GANGLION CYST EXCISION      HAND SURGERY Left     x7 and skin grafts    OVARIAN CYST DRAINAGE      REMOVAL OF INTRAUTERINE DEVICE (IUD)      WRIST SURGERY       Social History   Social History     Substance and Sexual Activity   Alcohol Use Not Currently     Social History     Substance and Sexual Activity   Drug Use No     Social History     Tobacco Use   Smoking Status Former Smoker    Packs/day: 1 00    Years: 10 00    Pack years: 10 00    Types: Cigarettes    Quit date:     Years since quitting: 15 0   Smokeless Tobacco Never Used     Family History: non-contributory    Meds/Allergies   all medications and allergies reviewed  Allergies   Allergen Reactions    Dayquil [Pseudoephedrine-Dm-Gg-Apap] Rash    Duricef [Cefadroxil] Anaphylaxis    Penicillins Anaphylaxis       Objective     Current Vitals:   Blood Pressure: 122/86 (22 1309)  Pulse: 91 (22 1309)  Temperature: 99 4 °F (37 4 °C) (22 1309)  Temp Source: Tympanic (22 1309)  Height: 5' 3" (160 cm) (22 1309)  Weight - Scale: 121 kg (266 lb 8 oz) (22 1309)      Invasive Devices  Report    Peripheral Intravenous Line            Peripheral IV 21 Right Antecubital 185 days                Physical Exam  Vitals and nursing note reviewed  Constitutional:       General: She is not in acute distress  Appearance: Normal appearance  She is well-developed  She is not diaphoretic  HENT:      Head: Normocephalic and atraumatic  Nose: Nose normal    Eyes:      General: No scleral icterus  Right eye: No discharge  Left eye: No discharge  Conjunctiva/sclera: Conjunctivae normal    Cardiovascular:      Rate and Rhythm: Normal rate and regular rhythm  Heart sounds: Normal heart sounds  Pulmonary:      Effort: Pulmonary effort is normal  No respiratory distress  Breath sounds: Normal breath sounds  No stridor  No wheezing or rales  Chest:      Chest wall: No tenderness  Abdominal:      General: Bowel sounds are normal       Palpations: Abdomen is soft  Tenderness: There is no abdominal tenderness  There is no guarding or rebound  Comments: Abdomen is obese, soft and benign  Well-healed Pfannenstiel incision from previous   Musculoskeletal:         General: No deformity  Normal range of motion  Cervical back: Normal range of motion and neck supple  Lymphadenopathy:      Cervical: No cervical adenopathy  Skin:     General: Skin is warm and dry  Findings: No erythema or rash     Neurological: Mental Status: She is alert and oriented to person, place, and time  Psychiatric:         Behavior: Behavior normal          Thought Content: Thought content normal          Judgment: Judgment normal          Lab Results: I have personally reviewed pertinent lab results  Imaging: I have personally reviewed pertinent reports  EKG, Pathology, and Other Studies: I have personally reviewed pertinent reports  The endoscopy showed gastritis and small hiatal hernia most likely sliding type  The biopsies revealed  Final Diagnosis  A  Stomach, biopsy:     - Antral and oxyntic mucosa with mild chronic inactive gastritis  - No Helicobacter pylori organisms identified on H&E stained sections      - No intestinal metaplasia, dysplasia or neoplasia identified  Assessment/PLAN:    48 y o  female morbidly obese found to be a good candidate to undergo a weight loss operation upon being enrolled here at the [facility]     Patient has a long history of morbid obesity and is presenting to discuss the surgical weight loss options  Despite the patient best efforts patient was unable to lose any meaningful or sustainable weight using nonsurgical means  We had a long discussion regarding all the surgical weight-loss options at our disposal at this point and reviewed the risks and benefits of each procedure in details as it relates to her age, BMI and medical conditions  She has been pre certified to undergo a Laparoscopic Michael-en-Y gastric bypass possible sleeve gastrectomy  Here today to review her pre op test results  Has been medically cleared for the procedure  I have discussed with her at length the risks and benefits of the operation and reiterated the components of our multidisciplinary program and the importance of compliance and follow up in the post operative period   Although there is a great statistical chance of improvement or even resolution of most of her associated comorbidities, the results vary from patient to patient and they largely depend on her commitment  The patient was also instructed with regards to the importance of behavior modification, nutritional counseling, support meeting attendance and lifestyle changes that are important to ensure success  She was given the opportunity to ask questions and I have answered all of them  I have addressed with the patient the level of CODE STATUS for this hospital stay and after explaining the different options currently she wishes to be a Level I  She understands and wishes to proceed  She has lost all the weight required prior to surgery      Felicitas Weathers MD  1/20/2022  1:51 PM

## 2022-01-20 NOTE — H&P
BARIATRIC H&P - BARIATRIC SURGERY  Cliff George 48 y o  female MRN: [id number]  Unit/Bed#:  Encounter: [id number]      HPI:  Cliff George is a 48 y o  female who presents with a long-standing history of morbid obesity  She was found to be a good candidate to undergo a bariatric operation upon being [facility]  She is here today to discuss details of her surgery  Review of Systems   All other systems reviewed and are negative        Historical Information   Past Medical History:   Diagnosis Date    Anxiety     Depression     Exercise involving walking     30 min daily and light weights    Fatty liver     GERD (gastroesophageal reflux disease)     Heart murmur     "from birth"    Hiatal hernia     History of COVID-19 2020    recovered at home, moderate symptoms    History of pneumonia     History of UTI     "in  and polynephritis"    History of      x2 after 1st C section    Motion sickness     PONV (postoperative nausea and vomiting)     Psychiatric disorder     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    Wears glasses     reading     Past Surgical History:   Procedure Laterality Date    BREAST BIOPSY Right 2004    titanium clip implanted     SECTION      x1    DENTAL IMPLANT      2 lower right    EGD      ENDOMETRIAL ABLATION  2007    GANGLION CYST EXCISION      HAND SURGERY Left     x7 and skin grafts    OVARIAN CYST DRAINAGE      REMOVAL OF INTRAUTERINE DEVICE (IUD)      WRIST SURGERY       Social History   Social History     Substance and Sexual Activity   Alcohol Use Not Currently     Social History     Substance and Sexual Activity   Drug Use No     Social History     Tobacco Use   Smoking Status Former Smoker    Packs/day: 1 00    Years: 10 00    Pack years: 10 00    Types: Cigarettes    Quit date:     Years since quitting: 15 0   Smokeless Tobacco Never Used     Family History: non-contributory    Meds/Allergies   all medications and allergies reviewed  Allergies   Allergen Reactions    Dayquil [Pseudoephedrine-Dm-Gg-Apap] Rash    Duricef [Cefadroxil] Anaphylaxis    Penicillins Anaphylaxis       Objective     Current Vitals:   Blood Pressure: 122/86 (22 1309)  Pulse: 91 (22 1309)  Temperature: 99 4 °F (37 4 °C) (22 1309)  Temp Source: Tympanic (22 1309)  Height: 5' 3" (160 cm) (22 1309)  Weight - Scale: 121 kg (266 lb 8 oz) (22 1309)      Invasive Devices  Report    Peripheral Intravenous Line            Peripheral IV 21 Right Antecubital 185 days                Physical Exam  Vitals and nursing note reviewed  Constitutional:       General: She is not in acute distress  Appearance: Normal appearance  She is well-developed  She is not diaphoretic  HENT:      Head: Normocephalic and atraumatic  Nose: Nose normal    Eyes:      General: No scleral icterus  Right eye: No discharge  Left eye: No discharge  Conjunctiva/sclera: Conjunctivae normal    Cardiovascular:      Rate and Rhythm: Normal rate and regular rhythm  Heart sounds: Normal heart sounds  Pulmonary:      Effort: Pulmonary effort is normal  No respiratory distress  Breath sounds: Normal breath sounds  No stridor  No wheezing or rales  Chest:      Chest wall: No tenderness  Abdominal:      General: Bowel sounds are normal       Palpations: Abdomen is soft  Tenderness: There is no abdominal tenderness  There is no guarding or rebound  Comments: Abdomen is obese, soft and benign  Well-healed Pfannenstiel incision from previous   Musculoskeletal:         General: No deformity  Normal range of motion  Cervical back: Normal range of motion and neck supple  Lymphadenopathy:      Cervical: No cervical adenopathy  Skin:     General: Skin is warm and dry  Findings: No erythema or rash     Neurological: Mental Status: She is alert and oriented to person, place, and time  Psychiatric:         Behavior: Behavior normal          Thought Content: Thought content normal          Judgment: Judgment normal          Lab Results: I have personally reviewed pertinent lab results  Imaging: I have personally reviewed pertinent reports  EKG, Pathology, and Other Studies: I have personally reviewed pertinent reports  The endoscopy showed gastritis and small hiatal hernia most likely sliding type  The biopsies revealed  Final Diagnosis  A  Stomach, biopsy:     - Antral and oxyntic mucosa with mild chronic inactive gastritis  - No Helicobacter pylori organisms identified on H&E stained sections      - No intestinal metaplasia, dysplasia or neoplasia identified  Assessment/PLAN:    48 y o  female morbidly obese found to be a good candidate to undergo a weight loss operation upon being enrolled here at the [facility]     Patient has a long history of morbid obesity and is presenting to discuss the surgical weight loss options  Despite the patient best efforts patient was unable to lose any meaningful or sustainable weight using nonsurgical means  We had a long discussion regarding all the surgical weight-loss options at our disposal at this point and reviewed the risks and benefits of each procedure in details as it relates to her age, BMI and medical conditions  She has been pre certified to undergo a Laparoscopic Michael-en-Y gastric bypass possible sleeve gastrectomy  Here today to review her pre op test results  Has been medically cleared for the procedure  I have discussed with her at length the risks and benefits of the operation and reiterated the components of our multidisciplinary program and the importance of compliance and follow up in the post operative period   Although there is a great statistical chance of improvement or even resolution of most of her associated comorbidities, the results vary from patient to patient and they largely depend on her commitment  The patient was also instructed with regards to the importance of behavior modification, nutritional counseling, support meeting attendance and lifestyle changes that are important to ensure success  She was given the opportunity to ask questions and I have answered all of them  I have addressed with the patient the level of CODE STATUS for this hospital stay and after explaining the different options currently she wishes to be a Level I  She understands and wishes to proceed  She has lost all the weight required prior to surgery      Casey Watson MD  1/20/2022  1:51 PM

## 2022-01-21 ENCOUNTER — ANESTHESIA EVENT (OUTPATIENT)
Dept: PERIOP | Facility: HOSPITAL | Age: 47
End: 2022-01-21
Payer: COMMERCIAL

## 2022-01-21 DIAGNOSIS — E66.01 OBESITY, CLASS III, BMI 40-49.9 (MORBID OBESITY) (HCC): Primary | ICD-10-CM

## 2022-01-24 ENCOUNTER — ANESTHESIA (OUTPATIENT)
Dept: PERIOP | Facility: HOSPITAL | Age: 47
End: 2022-01-24
Payer: COMMERCIAL

## 2022-01-24 ENCOUNTER — HOSPITAL ENCOUNTER (OUTPATIENT)
Facility: HOSPITAL | Age: 47
Setting detail: OUTPATIENT SURGERY
Discharge: HOME | End: 2022-01-25
Attending: SURGERY | Admitting: SURGERY
Payer: COMMERCIAL

## 2022-01-24 DIAGNOSIS — E66.01 CLASS 3 SEVERE OBESITY DUE TO EXCESS CALORIES WITHOUT SERIOUS COMORBIDITY WITH BODY MASS INDEX (BMI) OF 45.0 TO 49.9 IN ADULT (HCC): Primary | ICD-10-CM

## 2022-01-24 LAB
EXT PREGNANCY TEST URINE: NEGATIVE
EXT. CONTROL: NORMAL

## 2022-01-24 PROCEDURE — C1781 MESH (IMPLANTABLE): HCPCS | Performed by: SURGERY

## 2022-01-24 PROCEDURE — 43644 LAP GASTRIC BYPASS/ROUX-EN-Y: CPT | Performed by: SURGERY

## 2022-01-24 PROCEDURE — 81025 URINE PREGNANCY TEST: CPT | Performed by: SURGERY

## 2022-01-24 DEVICE — SEAMGUARD STPL REINF ENDO GIA ULTRA UNIV 60 PURPLE: Type: IMPLANTABLE DEVICE | Site: ABDOMEN | Status: FUNCTIONAL

## 2022-01-24 RX ORDER — HEPARIN SODIUM 5000 [USP'U]/ML
5000 INJECTION, SOLUTION INTRAVENOUS; SUBCUTANEOUS
Status: COMPLETED | OUTPATIENT
Start: 2022-01-24 | End: 2022-01-24

## 2022-01-24 RX ORDER — METOCLOPRAMIDE HYDROCHLORIDE 5 MG/ML
10 INJECTION INTRAMUSCULAR; INTRAVENOUS EVERY 6 HOURS PRN
Status: DISCONTINUED | OUTPATIENT
Start: 2022-01-24 | End: 2022-01-25 | Stop reason: HOSPADM

## 2022-01-24 RX ORDER — FENTANYL CITRATE 50 UG/ML
50 INJECTION, SOLUTION INTRAMUSCULAR; INTRAVENOUS
Status: DISCONTINUED | OUTPATIENT
Start: 2022-01-24 | End: 2022-01-24 | Stop reason: HOSPADM

## 2022-01-24 RX ORDER — MORPHINE SULFATE 4 MG/ML
4 INJECTION, SOLUTION INTRAMUSCULAR; INTRAVENOUS EVERY 4 HOURS PRN
Status: DISCONTINUED | OUTPATIENT
Start: 2022-01-24 | End: 2022-01-25 | Stop reason: HOSPADM

## 2022-01-24 RX ORDER — TRAZODONE HYDROCHLORIDE 50 MG/1
50 TABLET ORAL
Status: DISCONTINUED | OUTPATIENT
Start: 2022-01-24 | End: 2022-01-25 | Stop reason: HOSPADM

## 2022-01-24 RX ORDER — ACETAMINOPHEN 160 MG/5ML
975 SUSPENSION, ORAL (FINAL DOSE FORM) ORAL EVERY 8 HOURS SCHEDULED
Status: DISCONTINUED | OUTPATIENT
Start: 2022-01-24 | End: 2022-01-25 | Stop reason: HOSPADM

## 2022-01-24 RX ORDER — CELECOXIB 200 MG/1
200 CAPSULE ORAL ONCE
Status: COMPLETED | OUTPATIENT
Start: 2022-01-24 | End: 2022-01-24

## 2022-01-24 RX ORDER — OXYCODONE HYDROCHLORIDE 5 MG/1
5 TABLET ORAL EVERY 4 HOURS PRN
Qty: 10 TABLET | Refills: 0 | Status: SHIPPED | OUTPATIENT
Start: 2022-01-24 | End: 2022-01-25 | Stop reason: SDUPTHER

## 2022-01-24 RX ORDER — EPHEDRINE SULFATE 50 MG/ML
INJECTION INTRAVENOUS AS NEEDED
Status: DISCONTINUED | OUTPATIENT
Start: 2022-01-24 | End: 2022-01-24

## 2022-01-24 RX ORDER — SCOLOPAMINE TRANSDERMAL SYSTEM 1 MG/1
1 PATCH, EXTENDED RELEASE TRANSDERMAL ONCE
Status: DISCONTINUED | OUTPATIENT
Start: 2022-01-24 | End: 2022-01-25 | Stop reason: HOSPADM

## 2022-01-24 RX ORDER — MAGNESIUM HYDROXIDE 1200 MG/15ML
LIQUID ORAL AS NEEDED
Status: DISCONTINUED | OUTPATIENT
Start: 2022-01-24 | End: 2022-01-24 | Stop reason: HOSPADM

## 2022-01-24 RX ORDER — GABAPENTIN 300 MG/1
300 CAPSULE ORAL ONCE
Status: COMPLETED | OUTPATIENT
Start: 2022-01-24 | End: 2022-01-24

## 2022-01-24 RX ORDER — VALACYCLOVIR HYDROCHLORIDE 500 MG/1
500 TABLET, FILM COATED ORAL EVERY OTHER DAY
Status: DISCONTINUED | OUTPATIENT
Start: 2022-01-25 | End: 2022-01-25 | Stop reason: HOSPADM

## 2022-01-24 RX ORDER — PROMETHAZINE HYDROCHLORIDE 25 MG/ML
25 INJECTION, SOLUTION INTRAMUSCULAR; INTRAVENOUS EVERY 6 HOURS PRN
Status: DISCONTINUED | OUTPATIENT
Start: 2022-01-24 | End: 2022-01-25 | Stop reason: HOSPADM

## 2022-01-24 RX ORDER — NEOSTIGMINE METHYLSULFATE 1 MG/ML
INJECTION INTRAVENOUS AS NEEDED
Status: DISCONTINUED | OUTPATIENT
Start: 2022-01-24 | End: 2022-01-24

## 2022-01-24 RX ORDER — OXYCODONE HCL 5 MG/5 ML
10 SOLUTION, ORAL ORAL EVERY 4 HOURS PRN
Status: DISCONTINUED | OUTPATIENT
Start: 2022-01-24 | End: 2022-01-25 | Stop reason: HOSPADM

## 2022-01-24 RX ORDER — DIPHENHYDRAMINE HCL 25 MG
25 TABLET ORAL EVERY 8 HOURS PRN
Status: DISCONTINUED | OUTPATIENT
Start: 2022-01-24 | End: 2022-01-25 | Stop reason: HOSPADM

## 2022-01-24 RX ORDER — SODIUM CHLORIDE, SODIUM LACTATE, POTASSIUM CHLORIDE, CALCIUM CHLORIDE 600; 310; 30; 20 MG/100ML; MG/100ML; MG/100ML; MG/100ML
100 INJECTION, SOLUTION INTRAVENOUS CONTINUOUS
Status: DISCONTINUED | OUTPATIENT
Start: 2022-01-24 | End: 2022-01-25 | Stop reason: HOSPADM

## 2022-01-24 RX ORDER — MIDAZOLAM HYDROCHLORIDE 2 MG/2ML
INJECTION, SOLUTION INTRAMUSCULAR; INTRAVENOUS AS NEEDED
Status: DISCONTINUED | OUTPATIENT
Start: 2022-01-24 | End: 2022-01-24

## 2022-01-24 RX ORDER — FENTANYL CITRATE 50 UG/ML
INJECTION, SOLUTION INTRAMUSCULAR; INTRAVENOUS AS NEEDED
Status: DISCONTINUED | OUTPATIENT
Start: 2022-01-24 | End: 2022-01-24

## 2022-01-24 RX ORDER — PROMETHAZINE HYDROCHLORIDE 25 MG/ML
12.5 INJECTION, SOLUTION INTRAMUSCULAR; INTRAVENOUS EVERY 4 HOURS PRN
Status: DISCONTINUED | OUTPATIENT
Start: 2022-01-24 | End: 2022-01-24 | Stop reason: HOSPADM

## 2022-01-24 RX ORDER — SODIUM CHLORIDE 9 MG/ML
125 INJECTION, SOLUTION INTRAVENOUS CONTINUOUS
Status: DISCONTINUED | OUTPATIENT
Start: 2022-01-24 | End: 2022-01-24 | Stop reason: ALTCHOICE

## 2022-01-24 RX ORDER — DEXAMETHASONE SODIUM PHOSPHATE 4 MG/ML
INJECTION, SOLUTION INTRA-ARTICULAR; INTRALESIONAL; INTRAMUSCULAR; INTRAVENOUS; SOFT TISSUE AS NEEDED
Status: DISCONTINUED | OUTPATIENT
Start: 2022-01-24 | End: 2022-01-24

## 2022-01-24 RX ORDER — SODIUM CHLORIDE 9 MG/ML
INJECTION, SOLUTION INTRAVENOUS AS NEEDED
Status: DISCONTINUED | OUTPATIENT
Start: 2022-01-24 | End: 2022-01-24 | Stop reason: HOSPADM

## 2022-01-24 RX ORDER — ONDANSETRON 2 MG/ML
4 INJECTION INTRAMUSCULAR; INTRAVENOUS EVERY 6 HOURS PRN
Status: DISCONTINUED | OUTPATIENT
Start: 2022-01-24 | End: 2022-01-25 | Stop reason: HOSPADM

## 2022-01-24 RX ORDER — ROCURONIUM BROMIDE 10 MG/ML
INJECTION, SOLUTION INTRAVENOUS AS NEEDED
Status: DISCONTINUED | OUTPATIENT
Start: 2022-01-24 | End: 2022-01-24

## 2022-01-24 RX ORDER — BUPIVACAINE HYDROCHLORIDE 5 MG/ML
INJECTION, SOLUTION PERINEURAL AS NEEDED
Status: DISCONTINUED | OUTPATIENT
Start: 2022-01-24 | End: 2022-01-24 | Stop reason: HOSPADM

## 2022-01-24 RX ORDER — METRONIDAZOLE 500 MG/1
500 TABLET ORAL EVERY 8 HOURS SCHEDULED
Status: DISCONTINUED | OUTPATIENT
Start: 2022-01-24 | End: 2022-01-24

## 2022-01-24 RX ORDER — LEVOFLOXACIN 5 MG/ML
750 INJECTION, SOLUTION INTRAVENOUS ONCE
Status: DISCONTINUED | OUTPATIENT
Start: 2022-01-24 | End: 2022-01-24 | Stop reason: HOSPADM

## 2022-01-24 RX ORDER — HYDROMORPHONE HCL/PF 1 MG/ML
0.5 SYRINGE (ML) INJECTION
Status: DISCONTINUED | OUTPATIENT
Start: 2022-01-24 | End: 2022-01-24 | Stop reason: HOSPADM

## 2022-01-24 RX ORDER — LEVOFLOXACIN 5 MG/ML
INJECTION, SOLUTION INTRAVENOUS CONTINUOUS PRN
Status: DISCONTINUED | OUTPATIENT
Start: 2022-01-24 | End: 2022-01-24

## 2022-01-24 RX ORDER — SIMETHICONE 80 MG
80 TABLET,CHEWABLE ORAL 4 TIMES DAILY PRN
Status: DISCONTINUED | OUTPATIENT
Start: 2022-01-24 | End: 2022-01-25 | Stop reason: HOSPADM

## 2022-01-24 RX ORDER — OXYCODONE HCL 5 MG/5 ML
5 SOLUTION, ORAL ORAL EVERY 4 HOURS PRN
Status: DISCONTINUED | OUTPATIENT
Start: 2022-01-24 | End: 2022-01-25 | Stop reason: HOSPADM

## 2022-01-24 RX ORDER — LIDOCAINE HYDROCHLORIDE 20 MG/ML
INJECTION, SOLUTION EPIDURAL; INFILTRATION; INTRACAUDAL; PERINEURAL AS NEEDED
Status: DISCONTINUED | OUTPATIENT
Start: 2022-01-24 | End: 2022-01-24

## 2022-01-24 RX ORDER — ESCITALOPRAM OXALATE 10 MG/1
10 TABLET ORAL DAILY
Status: DISCONTINUED | OUTPATIENT
Start: 2022-01-25 | End: 2022-01-25 | Stop reason: HOSPADM

## 2022-01-24 RX ORDER — PROPOFOL 10 MG/ML
INJECTION, EMULSION INTRAVENOUS AS NEEDED
Status: DISCONTINUED | OUTPATIENT
Start: 2022-01-24 | End: 2022-01-24

## 2022-01-24 RX ORDER — LORAZEPAM 0.5 MG/1
1 TABLET ORAL
Status: DISCONTINUED | OUTPATIENT
Start: 2022-01-24 | End: 2022-01-25 | Stop reason: HOSPADM

## 2022-01-24 RX ORDER — GLYCOPYRROLATE 0.2 MG/ML
INJECTION INTRAMUSCULAR; INTRAVENOUS AS NEEDED
Status: DISCONTINUED | OUTPATIENT
Start: 2022-01-24 | End: 2022-01-24

## 2022-01-24 RX ORDER — ONDANSETRON 2 MG/ML
INJECTION INTRAMUSCULAR; INTRAVENOUS AS NEEDED
Status: DISCONTINUED | OUTPATIENT
Start: 2022-01-24 | End: 2022-01-24

## 2022-01-24 RX ORDER — ONDANSETRON 2 MG/ML
4 INJECTION INTRAMUSCULAR; INTRAVENOUS EVERY 6 HOURS PRN
Status: DISCONTINUED | OUTPATIENT
Start: 2022-01-24 | End: 2022-01-24 | Stop reason: HOSPADM

## 2022-01-24 RX ORDER — OMEPRAZOLE 20 MG/1
20 CAPSULE, DELAYED RELEASE ORAL DAILY
Qty: 30 CAPSULE | Refills: 3 | Status: SHIPPED | OUTPATIENT
Start: 2022-01-24 | End: 2022-05-10 | Stop reason: SDUPTHER

## 2022-01-24 RX ORDER — ACETAMINOPHEN 325 MG/1
975 TABLET ORAL EVERY 8 HOURS SCHEDULED
Status: DISCONTINUED | OUTPATIENT
Start: 2022-01-24 | End: 2022-01-25 | Stop reason: HOSPADM

## 2022-01-24 RX ADMIN — SODIUM CHLORIDE, SODIUM LACTATE, POTASSIUM CHLORIDE, AND CALCIUM CHLORIDE 100 ML/HR: .6; .31; .03; .02 INJECTION, SOLUTION INTRAVENOUS at 12:07

## 2022-01-24 RX ADMIN — ROCURONIUM BROMIDE 50 MG: 50 INJECTION, SOLUTION INTRAVENOUS at 08:58

## 2022-01-24 RX ADMIN — METRONIDAZOLE 500 MG: 500 INJECTION, SOLUTION INTRAVENOUS at 16:51

## 2022-01-24 RX ADMIN — PROMETHAZINE HYDROCHLORIDE 25 MG: 25 INJECTION INTRAMUSCULAR; INTRAVENOUS at 13:19

## 2022-01-24 RX ADMIN — GABAPENTIN 300 MG: 300 CAPSULE ORAL at 08:08

## 2022-01-24 RX ADMIN — NEOSTIGMINE METHYLSULFATE 5 MG: 1 INJECTION INTRAVENOUS at 11:01

## 2022-01-24 RX ADMIN — EPHEDRINE SULFATE 5 MG: 50 INJECTION, SOLUTION INTRAVENOUS at 09:59

## 2022-01-24 RX ADMIN — ONDANSETRON 4 MG: 2 INJECTION INTRAMUSCULAR; INTRAVENOUS at 17:54

## 2022-01-24 RX ADMIN — METOCLOPRAMIDE 10 MG: 5 INJECTION, SOLUTION INTRAMUSCULAR; INTRAVENOUS at 14:25

## 2022-01-24 RX ADMIN — FENTANYL CITRATE 50 MCG: 50 INJECTION INTRAMUSCULAR; INTRAVENOUS at 11:05

## 2022-01-24 RX ADMIN — HEPARIN SODIUM 5000 UNITS: 5000 INJECTION INTRAVENOUS; SUBCUTANEOUS at 08:08

## 2022-01-24 RX ADMIN — FENTANYL CITRATE 50 MCG: 50 INJECTION INTRAMUSCULAR; INTRAVENOUS at 11:52

## 2022-01-24 RX ADMIN — CELECOXIB 200 MG: 200 CAPSULE ORAL at 08:08

## 2022-01-24 RX ADMIN — TRAZODONE HYDROCHLORIDE 50 MG: 50 TABLET ORAL at 22:37

## 2022-01-24 RX ADMIN — ACETAMINOPHEN 975 MG: 325 SUSPENSION ORAL at 20:05

## 2022-01-24 RX ADMIN — FENTANYL CITRATE 50 MCG: 50 INJECTION INTRAMUSCULAR; INTRAVENOUS at 10:15

## 2022-01-24 RX ADMIN — METRONIDAZOLE 500 MG: 500 INJECTION, SOLUTION INTRAVENOUS at 09:17

## 2022-01-24 RX ADMIN — SODIUM CHLORIDE 125 ML/HR: 9 INJECTION, SOLUTION INTRAVENOUS at 08:12

## 2022-01-24 RX ADMIN — SCOPALAMINE 1 PATCH: 1 PATCH, EXTENDED RELEASE TRANSDERMAL at 07:55

## 2022-01-24 RX ADMIN — ONDANSETRON 4 MG: 2 INJECTION INTRAMUSCULAR; INTRAVENOUS at 11:01

## 2022-01-24 RX ADMIN — FAMOTIDINE 20 MG: 10 INJECTION INTRAVENOUS at 16:51

## 2022-01-24 RX ADMIN — MIDAZOLAM 2 MG: 1 INJECTION INTRAMUSCULAR; INTRAVENOUS at 08:46

## 2022-01-24 RX ADMIN — FENTANYL CITRATE 100 MCG: 50 INJECTION INTRAMUSCULAR; INTRAVENOUS at 08:58

## 2022-01-24 RX ADMIN — LIDOCAINE HYDROCHLORIDE 100 MG: 20 INJECTION, SOLUTION EPIDURAL; INFILTRATION; INTRACAUDAL; PERINEURAL at 08:58

## 2022-01-24 RX ADMIN — ROCURONIUM BROMIDE 20 MG: 50 INJECTION, SOLUTION INTRAVENOUS at 09:53

## 2022-01-24 RX ADMIN — GLYCOPYRROLATE 1 MG: 0.2 INJECTION, SOLUTION INTRAMUSCULAR; INTRAVENOUS at 11:01

## 2022-01-24 RX ADMIN — LEVOFLOXACIN: 5 INJECTION, SOLUTION INTRAVENOUS at 08:45

## 2022-01-24 RX ADMIN — EPHEDRINE SULFATE 5 MG: 50 INJECTION, SOLUTION INTRAVENOUS at 09:57

## 2022-01-24 RX ADMIN — OXYCODONE HYDROCHLORIDE 5 MG: 5 SOLUTION ORAL at 18:21

## 2022-01-24 RX ADMIN — DEXAMETHASONE SODIUM PHOSPHATE 4 MG: 4 INJECTION INTRA-ARTICULAR; INTRALESIONAL; INTRAMUSCULAR; INTRAVENOUS; SOFT TISSUE at 09:12

## 2022-01-24 RX ADMIN — LORAZEPAM 1 MG: 0.5 TABLET ORAL at 22:37

## 2022-01-24 RX ADMIN — PROPOFOL 200 MG: 10 INJECTION, EMULSION INTRAVENOUS at 08:58

## 2022-01-24 RX ADMIN — ONDANSETRON 4 MG: 2 INJECTION INTRAMUSCULAR; INTRAVENOUS at 11:36

## 2022-01-24 RX ADMIN — TRIMETHOBENZAMIDE HYDROCHLORIDE 200 MG: 100 INJECTION INTRAMUSCULAR at 12:15

## 2022-01-24 NOTE — INTERVAL H&P NOTE
H&P reviewed  After examining the patient I find no changes in the patients condition since the H&P had been written      Vitals:    01/24/22 0745   BP: 109/59   Pulse: 78   Resp: 16   Temp: 98 4 °F (36 9 °C)   SpO2: 95%

## 2022-01-24 NOTE — NURSING NOTE
Bedside admission nurse completed inpatient admission questionnaire  Patient comfortable and has no further needs at this time  Call bell in place  Pt comfortable, sitting oob in chair   Reports nausea, RN aware

## 2022-01-24 NOTE — OP NOTE
[facility]   [address] [facility], [address]  [phone] (Fax)      Operative Report  LAPAROSCOPIC JONATHAN-EN-Y GASTRIC BYPASS & INTRAOPERATIVE EGD     Patient Name: Rama Barahona    :  1975  MRN: [id number]  Patient Location: [facility] OR ROOM 06  Surgery Date : 2022  Surgeons:  Surgeon(s) and Role:     * Tu Luis MD - Primary     * Juli Gee PA-C      * Lulu Rios DO - Assisting    Diagnosis:    Pre-Op Diagnosis Codes: Morbid obesity ([facility] ) [E66 01]  Body mass index is 47 14 kg/m²  Hyperlipidemia, unspecified hyperlipidemia type [E78 5]  Gastroesophageal reflux disease, unspecified whether esophagitis present [K21 9]    Post-Op Diagnosis Codes:     * Morbid obesity ([facility] ) [E66 01]     * Body mass index is 47 14 kg/m²  * Hyperlipidemia, unspecified hyperlipidemia type [E78 5]     * Gastroesophageal reflux disease, unspecified whether esophagitis present [K21 9]    Procedure  1  Laparoscopic Jonathan-en-Y Gastric Bypass  2  Intraoperative Endoscopy    Specimen(s):  * No specimens in log *    Estimated Blood Loss:    20 mL * No LDAs found *    Anesthesia Type:     General    Operative Indications: Morbid obesity ([facility] ) [E66 01]  Hyperlipidemia, unspecified hyperlipidemia type [E78 5]  Gastroesophageal reflux disease, unspecified whether esophagitis present [K21 9]  Body mass index is 47 14 kg/m²  Operative Findings:    Normal    Complications:     None    Procedure and Technique:    INDICATION:    Rama Barahona is a 48 y o  female with a Body mass index is 47 14 kg/m²  and a long standing history of morbid obesity and inability to lose a significant amount of weight on its own  This patient was found to be a good candidate to undergo a bariatric procedure upon being enrolled here at the [address] [place] [address]  OPERATIVE TECHNIQUE    The patient was taken to the operating room and placed in a supine position   A dose of IV antibiotic prophylaxis that consisted of Levofloxacin 750mg and Metronidazole 500mg was given  Also, 5000 units of subcutaneous unfractionated heparin to prevent DVT were administered  Sequential compression devices were placed on both lower extremities  After satisfactory general anesthesia induction and endotracheal intubation was achieved, the extremities were secured to prevent neurovascular and musculoskeletal injuries as best as possible  Subsequently, the abdominal wall was prepped and draped in a surgical standard sterile fashion  After a timeout was done and the patient was properly identified and the type of procedure was confirmed a supra-umbilical transverse skin incision was made, and the subcutaneous tissues dissected  Access to the peritoneal cavity was gained with an optical trocar  With this device, we were able to visualize the layers of the abdominal wall, and enter the peritoneal cavity under direct visualization  Pneumoperitoneum was then established with CO2 insufflation  A four quadrant transversus abdominis plane block was performed under direct laparoscopic vision  After this was completed four additional trocars were placed: a 12 mm in the right upper quadrant subcostal position in the anterior axillary line, a 12-mm port was placed in the right flank midclavicular line, a 12-mm port was placed in the left upper quadrant subcostal position in the midclavicular line and another 12-mm port was placed in the left quadrant anterior axillary line lateral to the supraumbilical port  With the trocars in place, the dissection was begun  The omentum of the transverse colon was identified and elevated, this allowed for the ligament of Treitz to be visualized  The small bowel was run about 60 cm to a point distal from the ligament of Treitz and was divided with a stapler and a 60 mm cartridge   The Michael limb was then measured at 100 cm, and the 100 cm danika was brought in side-to-side opposition to the biliopancreatic limb  A side-to-side jejunojejunostomy was then created  This was accomplished by first making an antimesenteric enterotomy with cautery energy device  We then positioned the laparoscopic stapler with a 60-mm cartridge within the lumen of the bowel to create a stapled side-to-side anastomosis  The enterotomy was then approximated with a 2-0 silk suture, subsequently elevated and the stapler was then reloaded and positioned perpendicularly to the first staple lines, just below the margin of the enterotomy on the antimesenteric border of the bowel and closed transversely utilizing an additional 60-mm cartridge  The resulting mesenteric defect was then closed with a running nonabsorbable suture  A Brolin stitch was placed to prevent kinking  At this point we repositioned the patient into a reverse Trendelenburg and the [facility] liver retractor was placed in the subxiphoid position through the use of a 5-mm trocar incision  We then turned our attention to the gastroesophageal junction  The left heaven was skeletonized dissecting at the angle of His  The pars flaccida was identified and incised  The lesser sac was entered below the lesser curve at the level just inferior to the take off of the left gastric artery  The left gastric artery and hepatic vagal branches were preserved  We then created a 30 cc gastric pouch  To accomplish this, serial firings of a laparoscopic stapler 60-mm cartridge were utilized  The staple lines were reinforced with a butressing material  We created a pouch based on the lesser curve and in vertical orientation  This was accomplished by a  transverse firing of the stapler along the inferior edge of the pouch and then vertical serial firings of the stapler to the angle of His  This completely  the pouch from the gastric remnant   A 25 mm circular stapler anvil was passed through the mouth and into the esophagus and subsequently placed within the pouch  The inferior edge of the pouch was then opened with cautery and the anvil stem was brought through the anterior aspect of the pouch close to the staple line  We proceeded to divide the omentum all the way to the transverse colon  The end of the Michael limb was opened with the cautery and the circular stapling device was brought through the dilated left upper quadrant 12-mm port site, and passed through the open end of the Michael limb  The Michael limb was then passed in a antecolic and antegastric position to the pouch  This was accomplished without tension and without twist   The stem of the stapling device was then brought through the antimesenteric border of the Michael limb adjacent to the pouch  The stem and the anvil were united and the stapler was fired  This created an end-to-side gastrojejunostomy  The excess Michael limb proximal to the anastomosis was then resected with the cautery energy device and a laparoscopic stapler with a 60-mm cartridge  The anastomosis was reinforced with interrupted absorbable sutures at the intersection of the staple lines  The distal Michael limb was occluded and an EGD as well as an air insufflation test were performed  No intraoperative bleeding nor leaks were detected  I then covered the G-J anastomosis with a tongue of omentum in a Moshe patch fashion and secured it in place with a single 2/0 Vicryl stitch  The sponge, needle and instrument count was reported complete  The 12-mm port site on the left flank that was dilated for the circular stapler as well as the Visiport trocar were then closed with the use of a suture closure device and a 0 absorbable suture  The liver retractor was removed under direct laparoscopic visualization, and no bleeding was noted  The remaining ports were then also removed under laparoscopic visualization  The skin incisions were all closed with 4-0 absorbable subcuticular suture   The patient tolerated the procedure well, was extubated uneventfully and was transferred to the recovery room in stable condition  I was present for the entire length of the procedure as the attending of record  No qualified resident was available to assist   The presence of an assistant was necessary for camera holding, traction and counter traction and for help with suturing and stapling in addition to performing the intraop-EGD        Patient Disposition:    PACU     Signature: Casey Watson MD  Date: January 24, 2022  Time: 10:48 AM

## 2022-01-24 NOTE — ANESTHESIA POSTPROCEDURE EVALUATION
Post-Op Assessment Note    CV Status:  Stable    Pain management: adequate     Mental Status:  Alert and awake   Hydration Status:  Euvolemic   PONV Controlled:  Controlled   Airway Patency:  Patent      Post Op Vitals Reviewed: Yes      Staff: Anesthesiologist         No complications documented      BP      Temp      Pulse 70 (01/24/22 1152)   Resp 16 (01/24/22 1152)    SpO2 96 % (01/24/22 1152)

## 2022-01-24 NOTE — ANESTHESIA PREPROCEDURE EVALUATION
Procedure:  LAPAROSCOPIC JONATHAN-EN-Y GASTRIC BYPASS & INTRAOPERATIVE EGD; POSSIBLE SLEEVE GATRECTOMY (N/A Abdomen)    Relevant Problems   ANESTHESIA   (+) PONV (postoperative nausea and vomiting)      CARDIO   (+) Hyperlipidemia      GI/HEPATIC   (+) Gastroesophageal reflux disease without esophagitis      NEURO/PSYCH   (+) Anxiety   (+) Depression   (+) History of syndactyly   (+) Recurrent major depressive disorder (HCC)      Other   (+) Class 3 severe obesity without serious comorbidity with body mass index (BMI) of 45 0 to 49 9 in adult (HCC)   (+) Ground glass opacity present on imaging of lung   (+) Mediastinal lymphadenopathy        Physical Exam    Airway    Mallampati score: II  TM Distance: >3 FB  Neck ROM: full     Dental   No notable dental hx     Cardiovascular  Rhythm: regular, Rate: normal, Cardiovascular exam normal    Pulmonary  Pulmonary exam normal Breath sounds clear to auscultation,     Other Findings        Anesthesia Plan  ASA Score- 3     Anesthesia Type- general with ASA Monitors  Additional Monitors:   Airway Plan: ETT  Comment: SCOP patch ordered  Plan Factors-    Chart reviewed  EKG reviewed  Existing labs reviewed  Patient summary reviewed  Patient is not a current smoker  Patient instructed to abstain from smoking on day of procedure  Patient did not smoke on day of surgery  Induction- intravenous  Postoperative Plan- Plan for postoperative opioid use  Planned trial extubation    Informed Consent- Anesthetic plan and risks discussed with patient

## 2022-01-25 VITALS
RESPIRATION RATE: 18 BRPM | DIASTOLIC BLOOD PRESSURE: 66 MMHG | SYSTOLIC BLOOD PRESSURE: 127 MMHG | TEMPERATURE: 99.4 F | HEIGHT: 63 IN | BODY MASS INDEX: 47.15 KG/M2 | HEART RATE: 72 BPM | OXYGEN SATURATION: 94 % | WEIGHT: 266.1 LBS

## 2022-01-25 DIAGNOSIS — E66.01 OBESITY, CLASS III, BMI 40-49.9 (MORBID OBESITY) (HCC): ICD-10-CM

## 2022-01-25 LAB
ANION GAP SERPL CALCULATED.3IONS-SCNC: 6 MMOL/L (ref 4–13)
BUN SERPL-MCNC: 13 MG/DL (ref 5–25)
CALCIUM SERPL-MCNC: 8.5 MG/DL (ref 8.3–10.1)
CHLORIDE SERPL-SCNC: 100 MMOL/L (ref 100–108)
CO2 SERPL-SCNC: 28 MMOL/L (ref 21–32)
CREAT SERPL-MCNC: 0.62 MG/DL (ref 0.6–1.3)
ERYTHROCYTE [DISTWIDTH] IN BLOOD BY AUTOMATED COUNT: 13.1 % (ref 11.6–15.1)
GFR SERPL CREATININE-BSD FRML MDRD: 106 ML/MIN/1.73SQ M
GLUCOSE SERPL-MCNC: 153 MG/DL (ref 65–140)
HCT VFR BLD AUTO: 40.4 % (ref 34.8–46.1)
HGB BLD-MCNC: 13.8 G/DL (ref 11.5–15.4)
MCH RBC QN AUTO: 31.4 PG (ref 26.8–34.3)
MCHC RBC AUTO-ENTMCNC: 34.2 G/DL (ref 31.4–37.4)
MCV RBC AUTO: 92 FL (ref 82–98)
PLATELET # BLD AUTO: 289 THOUSANDS/UL (ref 149–390)
PMV BLD AUTO: 11.1 FL (ref 8.9–12.7)
POTASSIUM SERPL-SCNC: 5.1 MMOL/L (ref 3.5–5.3)
RBC # BLD AUTO: 4.39 MILLION/UL (ref 3.81–5.12)
SODIUM SERPL-SCNC: 134 MMOL/L (ref 136–145)
WBC # BLD AUTO: 15.14 THOUSAND/UL (ref 4.31–10.16)

## 2022-01-25 PROCEDURE — 85027 COMPLETE CBC AUTOMATED: CPT | Performed by: PHYSICIAN ASSISTANT

## 2022-01-25 PROCEDURE — 99024 POSTOP FOLLOW-UP VISIT: CPT | Performed by: SURGERY

## 2022-01-25 PROCEDURE — NC001 PR NO CHARGE: Performed by: SURGERY

## 2022-01-25 PROCEDURE — 80048 BASIC METABOLIC PNL TOTAL CA: CPT | Performed by: PHYSICIAN ASSISTANT

## 2022-01-25 RX ORDER — DEXAMETHASONE SODIUM PHOSPHATE 10 MG/ML
10 INJECTION, SOLUTION INTRAMUSCULAR; INTRAVENOUS EVERY 8 HOURS SCHEDULED
Status: DISCONTINUED | OUTPATIENT
Start: 2022-01-25 | End: 2022-01-25 | Stop reason: HOSPADM

## 2022-01-25 RX ORDER — ACETAMINOPHEN 160 MG/5ML
975 SUSPENSION, ORAL (FINAL DOSE FORM) ORAL EVERY 8 HOURS SCHEDULED
Qty: 638.4 ML | Refills: 0 | Status: SHIPPED | OUTPATIENT
Start: 2022-01-25 | End: 2022-02-01

## 2022-01-25 RX ORDER — OXYCODONE HYDROCHLORIDE 5 MG/1
5 TABLET ORAL EVERY 4 HOURS PRN
Qty: 10 TABLET | Refills: 0 | Status: SHIPPED | OUTPATIENT
Start: 2022-01-25 | End: 2022-02-07 | Stop reason: ALTCHOICE

## 2022-01-25 RX ORDER — ONDANSETRON 4 MG/1
4 TABLET, ORALLY DISINTEGRATING ORAL EVERY 6 HOURS PRN
Qty: 10 TABLET | Refills: 0 | Status: SHIPPED | OUTPATIENT
Start: 2022-01-25 | End: 2022-01-27 | Stop reason: SDUPTHER

## 2022-01-25 RX ADMIN — ACETAMINOPHEN 975 MG: 325 SUSPENSION ORAL at 05:16

## 2022-01-25 RX ADMIN — ESCITALOPRAM OXALATE 10 MG: 10 TABLET, FILM COATED ORAL at 08:55

## 2022-01-25 RX ADMIN — VALACYCLOVIR HYDROCHLORIDE 500 MG: 500 TABLET, FILM COATED ORAL at 08:55

## 2022-01-25 RX ADMIN — SODIUM CHLORIDE, SODIUM LACTATE, POTASSIUM CHLORIDE, AND CALCIUM CHLORIDE 100 ML/HR: .6; .31; .03; .02 INJECTION, SOLUTION INTRAVENOUS at 00:30

## 2022-01-25 RX ADMIN — FAMOTIDINE 20 MG: 10 INJECTION INTRAVENOUS at 08:55

## 2022-01-25 RX ADMIN — METOCLOPRAMIDE 10 MG: 5 INJECTION, SOLUTION INTRAMUSCULAR; INTRAVENOUS at 12:22

## 2022-01-25 RX ADMIN — DEXAMETHASONE SODIUM PHOSPHATE 10 MG: 10 INJECTION, SOLUTION INTRAMUSCULAR; INTRAVENOUS at 07:51

## 2022-01-25 RX ADMIN — ONDANSETRON 4 MG: 2 INJECTION INTRAMUSCULAR; INTRAVENOUS at 10:09

## 2022-01-25 RX ADMIN — METRONIDAZOLE 500 MG: 500 INJECTION, SOLUTION INTRAVENOUS at 00:26

## 2022-01-25 RX ADMIN — SIMETHICONE 80 MG: 80 TABLET, CHEWABLE ORAL at 05:16

## 2022-01-25 NOTE — TELEPHONE ENCOUNTER
I had to resend this to you because an e-scribing error came over  I called [email or web address] and we cannot call this in as it is a narcotic  So I am resending in hopes it will go through  If not you will need to write a script

## 2022-01-25 NOTE — DISCHARGE INSTR - AVS FIRST PAGE
your medications from [address] in [facility] [address] or cut your pills and open capsules, mix with liquid to drink    Take Tylenol every 8 hours around the clock, unless instructed otherwise  Take your omeprazole daily  It is important to stay hydrated and follow your discharge diet progression   Mild nausea is ok as long as you can drink fluids, sip very slowly and get up and walk during any periods of nausea  You may shower normally after 48 hours, but do not scrub incision sites, blot gently with clean towel to dry incisions  Take home medications as usual unless instructed otherwise while in hospital  Follow up with Dr Anyi Amaya and your PCP within the next week

## 2022-01-25 NOTE — PROGRESS NOTES
Progress Note - Bariatric Surgery   Elridge Manifold 48 y o  female MRN: [id number]  Unit/Bed#: SDS 01 Encounter: [id number]      Subjective/Objective     Subjective:  Patient with morbid obesity POD1 s/p Laparoscopic RNYGB  Patient denies fevers, chills, sweats, SOB, CP, calf pain  Pain adequately controlled on oral pain medication  Ambulating without assistance, voiding well, and using incentive spirometer  Patient tolerating liquid diet with mild to moderate nausea but no vomiting today  Vital signs stable, mild desaturations, currenlty 91 percent  CBC today shows Hgb stable at 13 8 from 14 3 preop with elevated WBC at 15 14  BMP obtained today shows elevated glucose at 153 and hyponatremia at 134  No hx of CPAP  Objective:    BP 94/52   Pulse 72   Temp 99 4 °F (37 4 °C) (Temporal)   Resp 18   Ht 5' 3" (1 6 m)   Wt 121 kg (266 lb 1 5 oz)   LMP  (LMP Unknown)   SpO2 94%   BMI 47 14 kg/m²       Intake/Output Summary (Last 24 hours) at 1/25/2022 0731  Last data filed at 1/25/2022 0510  Gross per 24 hour   Intake 2870 ml   Output 770 ml   Net 2100 ml       Invasive Devices  Report    Peripheral Intravenous Line            Peripheral IV 01/24/22 Left Hand <1 day                ROS: 10-point system completed  All negative except see HPI  Physical Exam    General Appearance:    Alert, cooperative, no distress, appears stated age   Head:    Normocephalic, without obvious abnormality, atraumatic   Lungs:     Respirations unlabored   Heart:    Regular rate and rhythm   Abdomen:     Soft, appropriate tenderness, no masses, no organomegaly, non-distended   Extremities:   Extremities normal, atraumatic, no cyanosis or edema   Neurologic:  Incision:  Psych:   Normal strength and sensation    Clean, dry, and intact, no bleeding, no drain    Normal mood and affect       Lab, Imaging and other studies:  I have personally reviewed pertinent lab results    , CBC:   Lab Results   Component Value Date    WBC 15 14 (H) 01/25/2022    HGB 13 8 01/25/2022    HCT 40 4 01/25/2022    MCV 92 01/25/2022     01/25/2022    MCH 31 4 01/25/2022    MCHC 34 2 01/25/2022    RDW 13 1 01/25/2022    MPV 11 1 01/25/2022   , CMP:   Lab Results   Component Value Date    SODIUM 134 (L) 01/25/2022    K 5 1 01/25/2022     01/25/2022    CO2 28 01/25/2022    BUN 13 01/25/2022    CREATININE 0 62 01/25/2022    CALCIUM 8 5 01/25/2022    EGFR 106 01/25/2022        VTE Mechanical Prophylaxis: sequential compression device    Assessment/Plan  1)  Patient with morbid Obesity s/p Laparoscopic RNYGB with stable post op course  Patient afebrile and hemodynamically stable  - Encourage PO fluids   - Nausea control PRN, added IV Decadron  - Recommend ambulation, use of SCDs when not ambulating, and incentive spirometry  - Complete antibiotic course  - No need to repeat labs  - Plan to D/C patient home today pending anticipated progression    Plan of care was discussed with patient  Care plan discussed with Dr Jb Gee, PA-C  Bariatric Surgery  1/25/2022  7:31 AM

## 2022-01-25 NOTE — DISCHARGE INSTRUCTIONS
Bariatric/Weight Loss Surgery  Hospital Discharge Instructions  1  ACTIVITY:  a  Progress as feels comfortable - a good rule is:  if you are doing something and it begins to hurt, stop doing the activity  Walk every hour while at home  b  Mignon Lindsey may walk stairs if you do so slowly  c  You may shower 48 hours after surgery  Do not scrub incision sites  Blot gently with clean towel to dry incisions  (see #4 below)   d  Use your incentive spirometer 10 times per hour while awake for 1 week after surgery  e  Do NOT drive for 48 hours after surgery  No driving 24 hours after taking certain prescription pain medications  Examples of such medication are Percocet, Darvocet, Oxycodone, Tylenol #3, and Tylenol with Codeine  2  DIET  a  Stay on a liquid diet for 7 days after your surgery date, sipping slowly  Refer to your manual for examples of choices  Remember to keep your liquids sugar free or low calorie  You may have protein drinks  Make sure to drink 48 to 64 ounces per day of fluids  b  Mignon Lindsey may advance to a pureed diet one week after surgery as instructed by your diet progression pamphlet  Once you get approval from your surgeon at your first post operative visit, you may advance to the soft diet and remain on soft diet for 8 weeks unless otherwise instructed  3  MEDICATIONS:  a  The abdominal nerve block will wear off during the first 1-2 days that you are home, and you may become sore (especially over incision site/sites where abdominal wall is sutured)  This may create a pulling sensation, especially while moving around, and will fade over time  Continue to take your Tylenol and your pain medication as instructed  b  Start vitamins and minerals one week after surgery or when you start stage 3/puree diet  c  Anti-acid Medication as per prescription  d  Other medications as indicated on the Physician Patient Discharge Instructions form given to you at the time of discharge    e  Make sure that you are splitting your pill or tablet medications in halves or fourths or even crushing them before you take them  Capsules should be opened and mixed with water or jello  You need to do this for at least 4 weeks after surgery  Eventually you will be able to take your medications the regular way as they were prescribed  Dickson [place] will need to consult with your Family Doctor in regards to all your prescribed medication, particularly those for blood pressure and diabetes  As you lose weight, medical conditions may change, requiring an alteration or elimination of the drug dose  Monitor blood pressure closely and call PCP with any concerns  g  Sleeve Gastrectomy patients ONLY:  Complete full course of lovenox injections!  h  DO NOT TAKE BIRTH CONTROL(BC) MEDICATIONS, INSERT BC VAGINAL RINGS, OR PLACE IUD OR ANY OTHER BC METHODS UNTIL 31 DAYS FROM DAY OF DISCHARGE FROM HOSPITAL  THIS PLACES YOU AT HIGH RISK FOR A POTENTIALLY LIFE THREATENING BLOOD CLOT  Remember to always use barrier methods for birth control and speak to your GYN about using two forms of birth control to start 31 days after surgery  It is very important to avoid pregnancy until at least 18-24 months after surgery  4  INCISION CARE  a  You may shower and get incisions wet 2 days after surgery  No soaking tub baths or swimming for 30 days after surgery  Keep abdominal area and incisions clean  Use soap and water to create a good lather and rinse off  Do not scrub incisions  b  If you have a drain, empty the drain as the nurses instructed  5  FOLLOW-UP APPOINTMENT should be made for one week after discharge  Call surgeons office at [phone] to schedule an appointment      6  CALL YOUR DOCTOR FOR:  pain not controlled by pain medications, a temperature greater than 101 5° F, any increase or change in drainage or redness from any incision, any vomiting or inability to keep liquids down, shortness of breath, shoulder pain, or bleeding

## 2022-01-25 NOTE — DISCHARGE SUMMARY
Discharge Summary - Carly Rice 48 y o  female MRN: [id number]    Unit/Bed#: SDS 01 Encounter: [id number]      Pre-Operative Diagnosis: Pre-Op Diagnosis Codes:     * Morbid obesity ([facility] [address] ) [E66 01]     * Hyperlipidemia, unspecified hyperlipidemia type [E78 5]     * Gastroesophageal reflux disease, unspecified whether esophagitis present [K21 9]    Post-Operative Diagnosis: Post-Op Diagnosis Codes:     * Morbid obesity ([facility] [address] ) [E66 01]     * Hyperlipidemia, unspecified hyperlipidemia type [E78 5]     * Gastroesophageal reflux disease, unspecified whether esophagitis present [K21 9]    Procedures Performed:  Procedure(s):  LAPAROSCOPIC JONATHAN-EN-Y GASTRIC BYPASS & INTRAOPERATIVE EGD    Surgeon: Rhys Allen MD    See H & P for full details of admission and Operative Note for full details of operations performed  Patient tolerated surgery well without complications  In the morning postoperative Day 1, the patient had mild nausea and abdominal pain  Tolerated a clear liquid diet without vomiting  Able to ambulate and voiding independently  Patient was deemed ready for discharge home  Patient was seen and examined prior to discharge  Provisions for Follow-Up Care:  See After Visit Summary/Discharge Instructions for information related to follow-up care and home orders  Disposition: Home, in stable condition  Planned Readmission: No    Discharge Medications:  See After Visit Summary/Discharge Instructions for reconciled discharge medications provided to patient and family  Post Operative instructions: Reviewed with patient and/or family      Signature:   Erich Alvarez PA-C  Date: 1/25/2022 Time: 7:37 AM

## 2022-01-26 ENCOUNTER — TRANSITIONAL CARE MANAGEMENT (OUTPATIENT)
Dept: INTERNAL MEDICINE CLINIC | Facility: OTHER | Age: 47
End: 2022-01-26

## 2022-01-26 ENCOUNTER — TELEPHONE (OUTPATIENT)
Dept: BARIATRICS | Facility: CLINIC | Age: 47
End: 2022-01-26

## 2022-01-27 ENCOUNTER — TELEPHONE (OUTPATIENT)
Dept: BARIATRICS | Facility: CLINIC | Age: 47
End: 2022-01-27

## 2022-01-27 DIAGNOSIS — E66.01 CLASS 3 SEVERE OBESITY DUE TO EXCESS CALORIES WITHOUT SERIOUS COMORBIDITY WITH BODY MASS INDEX (BMI) OF 45.0 TO 49.9 IN ADULT (HCC): ICD-10-CM

## 2022-01-27 RX ORDER — ONDANSETRON 4 MG/1
4 TABLET, ORALLY DISINTEGRATING ORAL EVERY 6 HOURS PRN
Qty: 10 TABLET | Refills: 0 | Status: SHIPPED | OUTPATIENT
Start: 2022-01-27 | End: 2022-02-07 | Stop reason: ALTCHOICE

## 2022-01-27 RX ORDER — METOCLOPRAMIDE 10 MG/1
10 TABLET ORAL EVERY 6 HOURS PRN
Qty: 10 TABLET | Refills: 0 | Status: SHIPPED | OUTPATIENT
Start: 2022-01-27 | End: 2022-02-07 | Stop reason: ALTCHOICE

## 2022-01-27 NOTE — TELEPHONE ENCOUNTER
Patient called the office because she is not feeling well  Patient stated that she had bypass sx on Monday 1/24/2022 with   [facility]  Patient stated that she has been experiencing dry heaving, nausea, cramping, pain and feeling heaviness in her stomach  She stated that she is walking a lot, as much as she can tolerate  She stated that she is also passing gas  Patient stated that she is not taking in the preferred amount of liquids, just sips of water to take her medicine  She stated she is taking her Omeprazole daily and Zofran as needed, which is often so she only has 3 tabs left  Patient stated she does have a fever of 100 1 at this time  She would like to know what she should do or take for some relief

## 2022-01-27 NOTE — TELEPHONE ENCOUNTER
1/28- update- Spoke with pt and she is feeling better today  She had two b/m's and is less nauseous  She's already gotten in 16oz of fluid today including G0 and protein shake  She will continue to take all meds as directed and call if needed before her follow up  Post op phone call completed  She is still very nauseous  Denies actually vomiting but frequent dry heaves  Took in 27 oz of liquids yesterday and today only about 10-12oz at 12:30pm  Almost out of Zofran but it's not really helping  Ambulating as directed  Passing flatus, last b/m Tuesday morning  Did not start Miralax after discharge  Will reach out to team for guidance

## 2022-02-03 ENCOUNTER — OFFICE VISIT (OUTPATIENT)
Dept: BARIATRICS | Facility: CLINIC | Age: 47
End: 2022-02-03

## 2022-02-03 VITALS
BODY MASS INDEX: 45.09 KG/M2 | HEIGHT: 63 IN | DIASTOLIC BLOOD PRESSURE: 78 MMHG | HEART RATE: 78 BPM | TEMPERATURE: 98 F | WEIGHT: 254.5 LBS | SYSTOLIC BLOOD PRESSURE: 120 MMHG

## 2022-02-03 DIAGNOSIS — Z98.84 BARIATRIC SURGERY STATUS: Primary | ICD-10-CM

## 2022-02-03 PROCEDURE — 99024 POSTOP FOLLOW-UP VISIT: CPT | Performed by: SURGERY

## 2022-02-03 PROCEDURE — RECHECK: Performed by: DIETITIAN, REGISTERED

## 2022-02-03 RX ORDER — SIMETHICONE 125 MG
125 TABLET,CHEWABLE ORAL EVERY 6 HOURS PRN
COMMUNITY

## 2022-02-03 NOTE — PROGRESS NOTES
POST OP UP VISIT - BARIATRIC SURGERY  Fatoumata eBthea 48 y o  female MRN: [id number]  Unit/Bed#:  Encounter: [id number]      HPI:  Fatoumata Bethea is a 48 y o  female status post laparoscopic RNYGB performed by Dr Mau Wheatley on 22 returning to office for first post op visit since surgery  Tolerating pureed diet  Denies nausea and vomiting  Taking multivitamins and PPI daily  Review of Systems   Constitutional: Negative for chills and fever  HENT: Negative for ear pain and sore throat  Eyes: Negative for pain and visual disturbance  Respiratory: Negative for cough and shortness of breath  Cardiovascular: Negative for chest pain and palpitations  Gastrointestinal: Negative for abdominal pain and vomiting  Genitourinary: Negative for dysuria and hematuria  Musculoskeletal: Negative for arthralgias and back pain  Skin: Negative for color change and rash  Neurological: Negative for seizures and syncope  All other systems reviewed and are negative        Historical Information   Past Medical History:   Diagnosis Date    Anxiety     Depression     Exercise involving walking     30 min daily and light weights    Fatty liver     GERD (gastroesophageal reflux disease)     Heart murmur     "from birth"    Hiatal hernia     History of COVID-19 2020    recovered at home, moderate symptoms    History of pneumonia     History of UTI     "in 20's and polynephritis"    History of      x2 after 1st C section    Motion sickness     PONV (postoperative nausea and vomiting)     Psychiatric disorder     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    Wears glasses     reading     Past Surgical History:   Procedure Laterality Date    BREAST BIOPSY Right 2004    titanium clip implanted     SECTION      x1    DENTAL IMPLANT      2 lower right    EGD      ENDOMETRIAL ABLATION  2007    GANGLION CYST EXCISION      HAND SURGERY Left     x7 and skin grafts    OVARIAN CYST DRAINAGE      CO LAP GASTRIC BYPASS/JONATHAN-EN-Y N/A 1/24/2022    Procedure: LAPAROSCOPIC JONATHAN-EN-Y GASTRIC BYPASS & INTRAOPERATIVE EGD;  Surgeon: Bossman Resendiz MD;  Location: [facility];  Service: [address] [place] [address] [address]      WRIST SURGERY       Social History   Social History     Substance and Sexual Activity   Alcohol Use Not Currently     Social History     Substance and Sexual Activity   Drug Use No     Social History     Tobacco Use   Smoking Status Former Smoker    Packs/day: 1 00    Years: 10 00    Pack years: 10 00    Types: Cigarettes    Quit date: 2007    Years since quitting: 15 1   Smokeless Tobacco Never Used     Family History: non-contributory    Meds/Allergies   all medications and allergies reviewed  Allergies   Allergen Reactions    Dayquil [Pseudoephedrine-Dm-Gg-Apap] Rash    Duricef [Cefadroxil] Anaphylaxis    Penicillins Anaphylaxis    Medical Tape Blisters       Objective       Current Vitals:   Blood Pressure: 120/78 (02/03/22 1026)  Pulse: 78 (02/03/22 1026)  Temperature: 98 °F (36 7 °C) (02/03/22 1026)  Temp Source: Tympanic (02/03/22 1026)  Height: 5' 3" (160 cm) (02/03/22 1026)  Weight - Scale: 115 kg (254 lb 8 oz) (02/03/22 1026)      Invasive Devices  Report    None                 Physical Exam  Constitutional:       Appearance: Normal appearance  She is obese  HENT:      Head: Normocephalic and atraumatic  Cardiovascular:      Rate and Rhythm: Normal rate  Pulmonary:      Effort: Pulmonary effort is normal    Abdominal:      Palpations: Abdomen is soft  Comments: Incisions c/d/i, healing well, no evidence of infection   Skin:     General: Skin is warm and dry  Neurological:      General: No focal deficit present  Mental Status: She is alert and oriented to person, place, and time     Psychiatric:         Mood and Affect: Mood normal          Behavior: Behavior normal  Assessment/PLAN:    48 y o  female status post laparoscopic RNYGB done on 1/24/22 by Dr Nitesh Grant, doing well post op  No major issues and healing well  Increase physical activity slowly as tolerated and instructed  Advance diet as instructed by our dietitians today and as indicated in the binder  Continue PPI    Follow up in one month as scheduled            Kavya Hernandez MD  Bariatric Surgery   2/3/2022  10:49 AM

## 2022-02-03 NOTE — PROGRESS NOTES
Weight Management Nutrition Class     Diagnosis: Obesity    Bariatric Surgeon: Dr Phill Fitzgerald    Surgery: Gastric Bypass Laparoscopic    Class: first post op note    Topics discussed today include:     fluid goals post op, protein goals post op, constipation, chew food well, exercise, diet progression, hypoglycemia, dumping syndrome, protein supplems, vitamin/mineral supplements, calcium supplements, additional vitamin B12, iron supplements and fat soluble vitamins     Patient was able to verbalize basic diet (protein, fluid, vitamin and mineral) recommendations and possible nutrition-related complications   Yes

## 2022-02-07 ENCOUNTER — OFFICE VISIT (OUTPATIENT)
Dept: INTERNAL MEDICINE CLINIC | Facility: CLINIC | Age: 47
End: 2022-02-07
Payer: COMMERCIAL

## 2022-02-07 VITALS
DIASTOLIC BLOOD PRESSURE: 72 MMHG | HEIGHT: 64 IN | HEART RATE: 83 BPM | WEIGHT: 252.6 LBS | SYSTOLIC BLOOD PRESSURE: 106 MMHG | TEMPERATURE: 98 F | OXYGEN SATURATION: 98 % | BODY MASS INDEX: 43.13 KG/M2

## 2022-02-07 DIAGNOSIS — K21.9 GASTROESOPHAGEAL REFLUX DISEASE WITHOUT ESOPHAGITIS: ICD-10-CM

## 2022-02-07 DIAGNOSIS — R09.82 POST-NASAL DRIP: ICD-10-CM

## 2022-02-07 DIAGNOSIS — R09.82 POSTNASAL DRIP: ICD-10-CM

## 2022-02-07 DIAGNOSIS — E66.01 CLASS 3 SEVERE OBESITY DUE TO EXCESS CALORIES WITHOUT SERIOUS COMORBIDITY WITH BODY MASS INDEX (BMI) OF 40.0 TO 44.9 IN ADULT (HCC): Primary | ICD-10-CM

## 2022-02-07 DIAGNOSIS — F41.9 ANXIETY: ICD-10-CM

## 2022-02-07 DIAGNOSIS — F33.9 DEPRESSION, RECURRENT (HCC): ICD-10-CM

## 2022-02-07 PROBLEM — M79.645 PAIN OF LEFT THUMB: Status: RESOLVED | Noted: 2021-09-02 | Resolved: 2022-02-07

## 2022-02-07 PROBLEM — Z98.84 HISTORY OF ROUX-EN-Y GASTRIC BYPASS: Status: ACTIVE | Noted: 2022-02-07

## 2022-02-07 PROBLEM — Z87.891 HISTORY OF TOBACCO ABUSE: Status: RESOLVED | Noted: 2021-02-24 | Resolved: 2022-02-07

## 2022-02-07 PROBLEM — M79.645 PAIN OF LEFT THUMB: Status: ACTIVE | Noted: 2021-09-02

## 2022-02-07 PROCEDURE — 99495 TRANSJ CARE MGMT MOD F2F 14D: CPT | Performed by: INTERNAL MEDICINE

## 2022-02-07 PROCEDURE — 3008F BODY MASS INDEX DOCD: CPT | Performed by: SURGERY

## 2022-02-07 RX ORDER — ACETAMINOPHEN 500 MG
500 TABLET ORAL EVERY 6 HOURS PRN
COMMUNITY
End: 2022-08-10

## 2022-02-07 RX ORDER — CHLORPHENIRAMINE MALEATE 4 MG/1
4 TABLET ORAL EVERY 6 HOURS PRN
Qty: 30 TABLET | Refills: 0 | Status: SHIPPED | OUTPATIENT
Start: 2022-02-07 | End: 2022-07-20

## 2022-02-07 RX ORDER — LORAZEPAM 0.5 MG/1
1 TABLET ORAL
Qty: 90 TABLET | Refills: 1 | Status: SHIPPED | OUTPATIENT
Start: 2022-02-07 | End: 2022-07-21 | Stop reason: SDUPTHER

## 2022-02-07 NOTE — ASSESSMENT & PLAN NOTE
No changes in current medications  Hopefully this will be a boost to her self-esteem with continued weight loss

## 2022-02-07 NOTE — ASSESSMENT & PLAN NOTE
Currently asymptomatic this has not been a significant issue following her gastric bypass surgery    She continues on omeprazole daily

## 2022-02-07 NOTE — PROGRESS NOTES
Assessment/Plan:    Anxiety  Lorazepam was renewed  Class 3 severe obesity without serious comorbidity with body mass index (BMI) of 40 0 to 44 9 in adult [facility])  Patient doing well post gastric bypass surgery  She has already reduced her BMI from 49 5 to 43 4    Depression, recurrent (HCC)  No changes in current medications  Hopefully this will be a boost to her self-esteem with continued weight loss  Gastroesophageal reflux disease without esophagitis  Currently asymptomatic this has not been a significant issue following her gastric bypass surgery  She continues on omeprazole daily    Postnasal drip  Symptomatic treatment with over-the-counter chlorpheniramine and or Nasonex       Diagnoses and all orders for this visit:    Class 3 severe obesity due to excess calories without serious comorbidity with body mass index (BMI) of 40 0 to 44 9 in adult ([facility])    Anxiety  -     LORazepam (ATIVAN) 0 5 mg tablet; Take 2 tablets (1 mg total) by mouth daily at bedtime    Gastroesophageal reflux disease without esophagitis    Depression, recurrent ([facility])    Post-nasal drip  -     chlorpheniramine (CHLOR-TRIMETON) 4 MG tablet; Take 1 tablet (4 mg total) by mouth every 6 (six) hours as needed for rhinitis    Postnasal drip    Other orders  -     acetaminophen (TYLENOL) 500 mg tablet; Take 500 mg by mouth every 6 (six) hours as needed for mild pain          Subjective:      Patient ID: Ollie Roldan is a 48 y o  female  Patient presents to the office for Transition of Care Management following laparoscopic Michael-en-Y gastric bypass for obesity  She is doing well at the present time  She experienced significant postoperative nausea and vomiting  This was treated with a combination of ondansetron and metoclopramide along with increased intake of water  Her symptoms gradually improved and at the present time she is not experiencing any symptoms in this regard whatsoever  She has met with dietary over her diet    She is transitioning to a regular exercise regimen  She is feeling very comfortable at the present time  She looks well and feels well  She has no complaints at the present time  She is not experiencing any symptoms of her reflux  She does occasionally have some issues with sleep secondary to some stress, anxiety and OCD  Hospital course was uncomplicated  She has had no fevers, chills, dyspnea, chest pain or palpitations  She does complain of some chronic postnasal drip  Denies any sinus pressure, headaches or sinus pain        Family History   Problem Relation Age of Onset   Alexandr Teddy Cancer Mother     Lung cancer Mother 61        small cell lung    Cancer Father     Lung cancer Father 47        adenocarcinoma of lung    No Known Problems Daughter     No Known Problems Maternal Grandmother     Prostate cancer Maternal Grandfather 80    No Known Problems Paternal Grandmother     No Known Problems Paternal Grandfather     No Known Problems Son     No Known Problems Daughter      Social History     Socioeconomic History    Marital status:      Spouse name: Not on file    Number of children: Not on file    Years of education: Not on file    Highest education level: Not on file   Occupational History    Not on file   Tobacco Use    Smoking status: Former Smoker     Packs/day: 1 00     Years: 10 00     Pack years: 10 00     Types: Cigarettes     Quit date: 2007     Years since quitting: 15 1    Smokeless tobacco: Never Used   Vaping Use    Vaping Use: Never used   Substance and Sexual Activity    Alcohol use: Not Currently    Drug use: No    Sexual activity: Not on file     Comment: defer/and pt had ablation"   Other Topics Concern    Not on file   Social History Narrative    Not on file     Social Determinants of Health     Financial Resource Strain: Not on file   Food Insecurity: Not on file   Transportation Needs: Not on file   Physical Activity: Not on file   Stress: Not on file   Social Connections: Not on file   Intimate Partner Violence: Not on file   Housing Stability: Not on file     Past Medical History:   Diagnosis Date    Anxiety     Depression     Exercise involving walking     30 min daily and light weights    Fatty liver     GERD (gastroesophageal reflux disease)     Heart murmur     "from birth"    Hiatal hernia     History of COVID-19 2020    recovered at home, moderate symptoms    History of pneumonia     History of tobacco abuse 2021    History of UTI     "in 20's and polynephritis"    History of      x2 after 1st C section    Motion sickness     PONV (postoperative nausea and vomiting)     Psychiatric disorder     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    Wears glasses     reading       Current Outpatient Medications:     acetaminophen (TYLENOL) 500 mg tablet, Take 500 mg by mouth every 6 (six) hours as needed for mild pain, Disp: , Rfl:     CALCIUM CITRATE PO, Take by mouth 3 chews per day , Disp: , Rfl:     escitalopram (LEXAPRO) 20 mg tablet, Take 1 tablet (20 mg total) by mouth daily, Disp: 30 tablet, Rfl: 5    FIBER ADULT GUMMIES PO, Take by mouth, Disp: , Rfl:     LORazepam (ATIVAN) 0 5 mg tablet, Take 2 tablets (1 mg total) by mouth daily at bedtime, Disp: 90 tablet, Rfl: 1    multivitamin (THERAGRAN) TABS, Take 1 tablet by mouth daily Alive , Disp: , Rfl:     omeprazole (PriLOSEC) 20 mg delayed release capsule, Take 1 capsule (20 mg total) by mouth daily, Disp: 30 capsule, Rfl: 3    polyethylene glycol (MiraLax) 17 GM/SCOOP powder, Take 17 g by mouth every other day  , Disp: , Rfl:     Probiotic Product (PRO-BIOTIC BLEND PO), Take by mouth, Disp: , Rfl:     simethicone (MYLICON) 539 MG chewable tablet, Chew 125 mg every 6 (six) hours as needed for flatulence chews, Disp: , Rfl:     traZODone (DESYREL) 50 mg tablet, Take 1 tablet (50 mg total) by mouth daily at bedtime (Patient taking differently: Take 50 mg by mouth as needed ), Disp: 90 tablet, Rfl: 1    valACYclovir (VALTREX) 500 mg tablet, Take 1 tablet (500 mg total) by mouth every other day, Disp: 45 tablet, Rfl: 1    chlorpheniramine (CHLOR-TRIMETON) 4 MG tablet, Take 1 tablet (4 mg total) by mouth every 6 (six) hours as needed for rhinitis, Disp: 30 tablet, Rfl: 0    Collagen-Vitamin C (COLLAGEN PLUS VITAMIN C PO), Take 1,500 mg by mouth daily   (Patient not taking: Reported on 2022 ), Disp: , Rfl:   Allergies   Allergen Reactions    Dayquil [Pseudoephedrine-Dm-Gg-Apap] Rash    Duricef [Cefadroxil] Anaphylaxis    Penicillins Anaphylaxis    Medical Tape Blisters     Past Surgical History:   Procedure Laterality Date    BREAST BIOPSY Right     titanium clip implanted     SECTION      x1    DENTAL IMPLANT      2 lower right    EGD      ENDOMETRIAL ABLATION      GANGLION CYST EXCISION      HAND SURGERY Left     x7 and skin grafts    OVARIAN CYST DRAINAGE      MT LAP GASTRIC BYPASS/JONATHAN-EN-Y N/A 2022    Procedure: LAPAROSCOPIC JONATHAN-EN-Y GASTRIC BYPASS & INTRAOPERATIVE EGD;  Surgeon: Letitia Schultz MD;  Location: [facility];  Service: [address] [place] [address] [address]     [email or web address] Call (since 2022)     Date and time call was made  2022  2:31 PM    Hospital care reviewed  Records reviewed        Patient was hospitialized at  [facility]        Date of Admission  22    Date of discharge  22    Diagnosis  Class 3 severe obesity without serious comorbidity with body mass index (BMI) of 45 0 to 49 9 in adult     Disposition  Home    Current Symptoms  Neausea; Incisional pain  dry heaving      TCM Call (since 2022)     Post hospital issues  Reduced activity    Scheduled for follow up?   Yes    Did you obtain your prescribed medications  Yes    Do you need help managing your prescriptions or medications  No    I have advised the patient to call PCP with any new or worsening symptoms  Gabino Carvajal [facility]            Review of Systems   Constitutional: Negative  Negative for activity change, appetite change, chills, diaphoresis, fatigue, fever and unexpected weight change  HENT: Negative  Eyes: Negative  Respiratory: Negative  Cardiovascular: Negative  Gastrointestinal: Negative  Endocrine: Negative  Genitourinary: Negative  Musculoskeletal: Negative  Skin: Negative  Neurological: Negative  Hematological: Negative  Psychiatric/Behavioral: The patient is not nervous/anxious  Objective:      /72 (BP Location: Right arm, Patient Position: Sitting, Cuff Size: Large)   Pulse 83   Temp 98 °F (36 7 °C) (Tympanic)   Ht 5' 4" (1 626 m)   Wt 115 kg (252 lb 9 6 oz)   LMP  (LMP Unknown)   SpO2 98%   BMI 43 36 kg/m²          Physical Exam  Vitals reviewed  Constitutional:       General: She is not in acute distress  Appearance: She is obese  She is not ill-appearing, toxic-appearing or diaphoretic  HENT:      Head: Normocephalic and atraumatic  Right Ear: External ear normal       Left Ear: External ear normal       Nose: Rhinorrhea (Minimal) present  Eyes:      Conjunctiva/sclera: Conjunctivae normal       Pupils: Pupils are equal, round, and reactive to light  Cardiovascular:      Rate and Rhythm: Normal rate and regular rhythm  Pulses: Normal pulses  Heart sounds: Normal heart sounds  No murmur heard  Pulmonary:      Effort: Pulmonary effort is normal  No respiratory distress  Breath sounds: Normal breath sounds  No wheezing or rales  Abdominal:      General: Abdomen is flat  There is no distension  Musculoskeletal:         General: No swelling  Cervical back: Neck supple  No rigidity  Right lower leg: No edema  Left lower leg: No edema  Skin:     General: Skin is warm  Capillary Refill: Capillary refill takes less than 2 seconds        Coloration: Skin is not jaundiced  Findings: No bruising, erythema or rash  Neurological:      General: No focal deficit present  Mental Status: She is alert and oriented to person, place, and time  Mental status is at baseline     Psychiatric:         Mood and Affect: Mood normal          Behavior: Behavior normal

## 2022-03-09 ENCOUNTER — CLINICAL SUPPORT (OUTPATIENT)
Dept: BARIATRICS | Facility: CLINIC | Age: 47
End: 2022-03-09

## 2022-03-09 DIAGNOSIS — Z98.84 HISTORY OF ROUX-EN-Y GASTRIC BYPASS: ICD-10-CM

## 2022-03-09 DIAGNOSIS — E66.01 CLASS 3 SEVERE OBESITY DUE TO EXCESS CALORIES WITHOUT SERIOUS COMORBIDITY WITH BODY MASS INDEX (BMI) OF 40.0 TO 44.9 IN ADULT (HCC): Primary | ICD-10-CM

## 2022-03-09 PROCEDURE — RECHECK: Performed by: DIETITIAN, REGISTERED

## 2022-03-09 NOTE — PROGRESS NOTES
Weight Management Nutrition Class     Diagnosis: Morbid Obesity    Bariatric Surgeon: Dr Roseann Sargent    Surgery: Gastric Bypass Laparoscopic    Class: 5 week post op     Topics discussed today include:     fluid goals post op, protein goals post op, constipation, chew food well, exercise, avoidance of alcohol, PPI use, diet progression, hypoglycemia, dumping syndrome, protein supplems, vitamin/mineral supplements, calcium supplements, additional vitamin B12, iron supplements and fat soluble vitamins     Patient was able to verbalize basic diet (protein, fluid, vitamin and mineral) recommendations and possible nutrition-related complications   Yes
I will SWITCH the dose or number of times a day I take the medications listed below when I get home from the hospital:  None

## 2022-05-03 NOTE — ASSESSMENT & PLAN NOTE
Clinically stable and healed epidermal inclusion cyst   Currently no surgical intervention is necessary Pt will have [employer] refax the paperwork to us since our fax was down from Friday till later yesterday

## 2022-05-09 ENCOUNTER — HOSPITAL ENCOUNTER (OUTPATIENT)
Dept: RADIOLOGY | Facility: HOSPITAL | Age: 47
Discharge: HOME | End: 2022-05-09
Attending: INTERNAL MEDICINE
Payer: COMMERCIAL

## 2022-05-09 DIAGNOSIS — Z87.891 HISTORY OF TOBACCO ABUSE: ICD-10-CM

## 2022-05-09 DIAGNOSIS — R91.8 GROUND GLASS OPACITY PRESENT ON IMAGING OF LUNG: ICD-10-CM

## 2022-05-09 DIAGNOSIS — R59.0 MEDIASTINAL LYMPHADENOPATHY: ICD-10-CM

## 2022-05-09 PROCEDURE — G1004 CDSM NDSC: HCPCS

## 2022-05-09 PROCEDURE — 71250 CT THORAX DX C-: CPT

## 2022-05-10 ENCOUNTER — OFFICE VISIT (OUTPATIENT)
Dept: BARIATRICS | Facility: CLINIC | Age: 47
End: 2022-05-10
Payer: COMMERCIAL

## 2022-05-10 VITALS
WEIGHT: 225 LBS | TEMPERATURE: 97.7 F | BODY MASS INDEX: 38.41 KG/M2 | SYSTOLIC BLOOD PRESSURE: 122 MMHG | HEIGHT: 64 IN | DIASTOLIC BLOOD PRESSURE: 80 MMHG | HEART RATE: 80 BPM

## 2022-05-10 DIAGNOSIS — Z12.11 COLON CANCER SCREENING: ICD-10-CM

## 2022-05-10 DIAGNOSIS — Z48.815 ENCOUNTER FOR SURGICAL AFTERCARE FOLLOWING SURGERY OF DIGESTIVE SYSTEM: Primary | ICD-10-CM

## 2022-05-10 DIAGNOSIS — Z98.84 BARIATRIC SURGERY STATUS: ICD-10-CM

## 2022-05-10 DIAGNOSIS — K21.9 GASTROESOPHAGEAL REFLUX DISEASE WITHOUT ESOPHAGITIS: ICD-10-CM

## 2022-05-10 DIAGNOSIS — K91.2 POSTSURGICAL MALABSORPTION: ICD-10-CM

## 2022-05-10 DIAGNOSIS — E66.9 OBESITY, CLASS II, BMI 35-39.9: ICD-10-CM

## 2022-05-10 PROCEDURE — 1036F TOBACCO NON-USER: CPT | Performed by: NURSE PRACTITIONER

## 2022-05-10 PROCEDURE — 99213 OFFICE O/P EST LOW 20 MIN: CPT | Performed by: NURSE PRACTITIONER

## 2022-05-10 RX ORDER — OMEPRAZOLE 20 MG/1
20 CAPSULE, DELAYED RELEASE ORAL DAILY
Qty: 30 CAPSULE | Refills: 3 | Status: SHIPPED | OUTPATIENT
Start: 2022-05-10 | End: 2022-06-20 | Stop reason: SDUPTHER

## 2022-05-10 NOTE — PROGRESS NOTES
Assessment/Plan:     Patient ID: Sherry Felix is a 48 y o  female  Bariatric Surgery Status    -s/p Michael-En-Y Gastric Bypass with Dr Yoni Patel on 01/24/2022  Presents to the office today for 2ND POST OP  Overall doing well, tolerating a regular  Denies having any N/V/D/C, regurgitation, reflux or dysphagia  Taking her MVI and PPI daily  PLAN:     - Continue with PPI for now until next visit  Will try to taper off next visit  - Routine follow up in 3 months    - Continue with healthy lifestyle, adequate protein intake of 60 gm, fluid intake of at least 64 oz  - Continue with MVI daily  - Activity as tolerated  - Labs ordered and will adjust accordingly if any deficiency  - Follow up with RD and SW as needed  · Continued/Maintain healthy weight loss with good nutrition intakes  · Adequate hydration with at least 64oz  fluid intake  · Follow diet as discussed  · Follow vitamin and mineral recommendations as reviewed with you  · Exercise as tolerated  · Colonoscopy referral made: due - referral to GI  · Mammogram - UTD     · Follow-up in 3 months  We kindly ask that your arrive 15 minutes before your scheduled appointment time with your provider to allow our staff to room you, get your vital signs and update your chart  · Get lab work done  Please call the office if you need a script  It is recommended to check with your insurance BEFORE getting labs done to make sure they are covered by your policy  · Call our office if you have any problems with abdominal pain especially associated with fever, chills, nausea, vomiting or any other concerns  · All  Post-bariatric surgery patients should be aware that very small quantities of any alcohol can cause impairment and it is very possible not to feel the effect  The effect can be in the system for several hours  It is also a stomach irritant       · It is advised to AVOID alcohol, Nonsteroidal antiinflammatory drugs (NSAIDS) and nicotine of all forms   Any of these can cause stomach irritation/pain  · Discussed the effects of alcohol on a bariatric patient and the increased impairment risk  · Keep up the good work! Postsurgical Malabsorption   -At risk for malabsorption of vitamins/minerals secondary to malabsorption and restriction of intake from bariatric surgery  -Currently taking adequate postop bariatric surgery vitamin supplementation  -Next set of bariatric labs ordered for approximately 2 weeks  -Patient received education about the importance of adhering to a lifelong supplementation regimen to avoid vitamin/mineral deficiencies      Diagnoses and all orders for this visit:    Encounter for surgical aftercare following surgery of digestive system  -     Zinc; Future  -     Vitamin D 25 hydroxy; Future  -     Vitamin B12; Future  -     Vitamin B1, whole blood; Future  -     Vitamin A; Future  -     PTH, intact; Future  -     Iron Saturation %; Future  -     Ferritin; Future  -     Folate; Future  -     Comprehensive metabolic panel; Future  -     CBC and Platelet; Future  -     omeprazole (PriLOSEC) 20 mg delayed release capsule; Take 1 capsule (20 mg total) by mouth daily    Bariatric surgery status  -     Zinc; Future  -     Vitamin D 25 hydroxy; Future  -     Vitamin B12; Future  -     Vitamin B1, whole blood; Future  -     Vitamin A; Future  -     PTH, intact; Future  -     Iron Saturation %; Future  -     Ferritin; Future  -     Folate; Future  -     Comprehensive metabolic panel; Future  -     CBC and Platelet; Future  -     omeprazole (PriLOSEC) 20 mg delayed release capsule; Take 1 capsule (20 mg total) by mouth daily    Postsurgical malabsorption  -     Zinc; Future  -     Vitamin D 25 hydroxy; Future  -     Vitamin B12; Future  -     Vitamin B1, whole blood; Future  -     Vitamin A; Future  -     PTH, intact; Future  -     Iron Saturation %; Future  -     Ferritin; Future  -     Folate;  Future  - Comprehensive metabolic panel; Future  -     CBC and Platelet; Future  -     omeprazole (PriLOSEC) 20 mg delayed release capsule; Take 1 capsule (20 mg total) by mouth daily    Obesity, Class III, BMI 40-49 9 (morbid obesity) ([facility])  -     Zinc; Future  -     Vitamin D 25 hydroxy; Future  -     Vitamin B12; Future  -     Vitamin B1, whole blood; Future  -     Vitamin A; Future  -     PTH, intact; Future  -     Iron Saturation %; Future  -     Ferritin; Future  -     Folate; Future  -     Comprehensive metabolic panel; Future  -     CBC and Platelet; Future  -     omeprazole (PriLOSEC) 20 mg delayed release capsule; Take 1 capsule (20 mg total) by mouth daily    Gastroesophageal reflux disease without esophagitis  -     Zinc; Future  -     Vitamin D 25 hydroxy; Future  -     Vitamin B12; Future  -     Vitamin B1, whole blood; Future  -     Vitamin A; Future  -     PTH, intact; Future  -     Iron Saturation %; Future  -     Ferritin; Future  -     Folate; Future  -     Comprehensive metabolic panel; Future  -     CBC and Platelet; Future  -     omeprazole (PriLOSEC) 20 mg delayed release capsule; Take 1 capsule (20 mg total) by mouth daily    Colon cancer screening         Subjective:      Patient ID: Abigail Landers is a 48 y o  female  -s/p Michael-En-Y Gastric Bypass with Dr Sandra Perea on 01/24/2022  Presents to the office today for 2ND POST OP  Overall doing well, tolerating a regular  Denies having any N/V/D/C, regurgitation, reflux or dysphagia  Taking her MVI and PPI daily  Initial: 287 lbs  Current: 225  EWL: (Weight loss is ahead of schedule at this post surgical period )  [place]: 223  Current BMI is Body mass index is 38 62 kg/m²  · Tolerating a regular diet-yes  · Eating at least 60 grams of protein per day-yes  · Following 30/60 minute rule with liquids-yes  · Drinking at least 64 ounces of fluid per day-no - have trouble at times  · Drinking carbonated beverages-rarely     · Sufficient exercise-no  · Using NSAIDs regularly-no  · Using nicotine-no  · Using alcohol-no  · Supplements: bariatric chewable, calcium    · EWL is 40%, which places the patient ahead of schedule for expected post surgical weight loss at this time  The following portions of the patient's history were reviewed and updated as appropriate: allergies, current medications, past family history, past medical history, past social history, past surgical history and problem list     Review of Systems   Constitutional: Negative  Respiratory: Negative  Cardiovascular: Negative  Musculoskeletal: Negative  Skin: Negative  Neurological: Negative  Psychiatric/Behavioral: Negative  Objective:    /80 (BP Location: Right arm, Patient Position: Sitting)   Pulse 80   Temp 97 7 °F (36 5 °C) (Tympanic)   Ht 5' 4" (1 626 m)   Wt 102 kg (225 lb)   BMI 38 62 kg/m²      Physical Exam  Vitals and nursing note reviewed  Constitutional:       Appearance: Normal appearance  She is obese  Cardiovascular:      Rate and Rhythm: Normal rate and regular rhythm  Pulses: Normal pulses  Heart sounds: Normal heart sounds  Pulmonary:      Effort: Pulmonary effort is normal       Breath sounds: Normal breath sounds  Abdominal:      General: Bowel sounds are normal       Palpations: Abdomen is soft  Tenderness: There is no abdominal tenderness  Comments: Healed incisions  Musculoskeletal:         General: Normal range of motion  Skin:     General: Skin is warm and dry  Neurological:      General: No focal deficit present  Mental Status: She is alert and oriented to person, place, and time  Psychiatric:         Mood and Affect: Mood normal          Behavior: Behavior normal          Thought Content:  Thought content normal          Judgment: Judgment normal

## 2022-05-13 ENCOUNTER — TELEPHONE (OUTPATIENT)
Dept: PULMONOLOGY | Facility: CLINIC | Age: 47
End: 2022-05-13

## 2022-05-13 NOTE — TELEPHONE ENCOUNTER
----- Message from Luisito Marquez MD sent at 5/12/2022  5:58 PM EDT -----    Would you please let the patient know that her CT scan showed improvement and resolution of the infiltrates on a prior CT scan     ----- Message -----  From: Interface, Radiology Results In  Sent: 5/10/2022   2:09 PM EDT  To: Luisito Marquez MD

## 2022-05-13 NOTE — TELEPHONE ENCOUNTER
Called and informed pt of results, she also stated she has lost over 70lbs and will be at her appt next week  No questions or concerns

## 2022-05-25 ENCOUNTER — OFFICE VISIT (OUTPATIENT)
Dept: PULMONOLOGY | Facility: CLINIC | Age: 47
End: 2022-05-25
Payer: COMMERCIAL

## 2022-05-25 VITALS
HEIGHT: 63 IN | BODY MASS INDEX: 38.8 KG/M2 | TEMPERATURE: 97.1 F | OXYGEN SATURATION: 98 % | HEART RATE: 69 BPM | SYSTOLIC BLOOD PRESSURE: 124 MMHG | DIASTOLIC BLOOD PRESSURE: 74 MMHG | WEIGHT: 219 LBS

## 2022-05-25 DIAGNOSIS — Z86.19 HISTORY OF COLD SORES: ICD-10-CM

## 2022-05-25 DIAGNOSIS — R06.02 SHORTNESS OF BREATH: Primary | ICD-10-CM

## 2022-05-25 DIAGNOSIS — R91.8 ABNORMAL CT SCAN OF LUNG: ICD-10-CM

## 2022-05-25 DIAGNOSIS — Z98.84 HISTORY OF ROUX-EN-Y GASTRIC BYPASS: ICD-10-CM

## 2022-05-25 DIAGNOSIS — F41.9 ANXIETY: ICD-10-CM

## 2022-05-25 PROCEDURE — 99214 OFFICE O/P EST MOD 30 MIN: CPT | Performed by: INTERNAL MEDICINE

## 2022-05-25 PROCEDURE — 3008F BODY MASS INDEX DOCD: CPT | Performed by: NURSE PRACTITIONER

## 2022-05-25 RX ORDER — VALACYCLOVIR HYDROCHLORIDE 500 MG/1
500 TABLET, FILM COATED ORAL EVERY OTHER DAY
Qty: 45 TABLET | Refills: 1 | Status: SHIPPED | OUTPATIENT
Start: 2022-05-25 | End: 2022-11-21

## 2022-05-25 RX ORDER — ESCITALOPRAM OXALATE 20 MG/1
20 TABLET ORAL DAILY
Qty: 30 TABLET | Refills: 5 | Status: SHIPPED | OUTPATIENT
Start: 2022-05-25 | End: 2022-11-21

## 2022-05-25 NOTE — PROGRESS NOTES
Pulmonary Follow Up Note   Keenan Irizarry 48 y o  female MRN: [id number]  5/25/2022      Assessment:    1  Shortness of breath  - Resolved, CT chest now without any groundglass opacities  Mediastinal lymph nodes not significantly enlarged and unchanged from prior CT  - 70 lb weight loss which is definitely helping with the SOB    - Encouraged her to continue with weight loss  No other actions required at this time  I recommended follow up PRN in the event she develops any future symptoms  2  History of tobacco abuse  Smoked 10 pack years and quit 2007    3  Groundglass opacity of the right upper lobe  - Resolved on most recent CT, no indication to reimage the lungs at this time     4  Obesity  - s/p huyen-en-y gastric bypass in jan 2022 and has lost 70 lbs    Plan:    There are no diagnoses linked to this encounter  No follow-ups on file  History of Present Illness   HPI:  Keenan Irizarry is a 48 y o  female who presents for follow up of shortness of breath  Sinc her last appointment she has underwent huyen-en-y gastric bypass  She lost 70lbs so far and continues to lose weight  Overall she feels great and now denies any shortness of breath at all  Denies any cough, chest pain  She has returned to being more active  She is taking all her multivitamins  No recent sick contacts, denies any wheezing or coughing  Denies any weight loss or fevers  Review of Systems   Constitutional: Positive for appetite change  Negative for chills and fever  HENT: Negative for congestion, ear pain, postnasal drip, rhinorrhea, sneezing, sore throat and trouble swallowing  Eyes: Negative for itching  Respiratory: Negative for cough, shortness of breath, wheezing and stridor  Cardiovascular: Negative for chest pain, palpitations and leg swelling  Gastrointestinal: Negative for abdominal distention, abdominal pain, nausea and vomiting  Genitourinary: Negative for dysuria and flank pain     Musculoskeletal: Negative for arthralgias and myalgias  Skin: Negative for color change  Neurological: Negative for dizziness, light-headedness and headaches  Psychiatric/Behavioral: Negative          Historical Information   Past Medical History:   Diagnosis Date    Anxiety     Depression     Exercise involving walking     30 min daily and light weights    Fatty liver     GERD (gastroesophageal reflux disease)     Heart murmur     "from birth"    Hiatal hernia     History of COVID-19 2020    recovered at home, moderate symptoms    History of pneumonia     History of tobacco abuse 2021    History of UTI     "in 's and polynephritis"    History of      x2 after 1st C section    Motion sickness     PONV (postoperative nausea and vomiting)     Psychiatric disorder     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    Wears glasses     reading     Past Surgical History:   Procedure Laterality Date    BREAST BIOPSY Right 2004    titanium clip implanted     SECTION      x1    DENTAL IMPLANT      2 lower right    EGD      ENDOMETRIAL ABLATION  2007    GANGLION CYST EXCISION      HAND SURGERY Left     x7 and skin grafts    OVARIAN CYST DRAINAGE      ID LAP GASTRIC BYPASS/JONATHAN-EN-Y N/A 2022    Procedure: LAPAROSCOPIC JONATHAN-EN-Y GASTRIC BYPASS & INTRAOPERATIVE EGD;  Surgeon: Vivian Norris MD;  Location: [facility];  Service: Bariatrics    REMOVAL OF INTRAUTERINE DEVICE (IUD)      WRIST SURGERY       Family History   Problem Relation Age of Onset   [place] Cancer Mother     Lung cancer Mother 61        small cell lung    Cancer Father     Lung cancer Father 47        adenocarcinoma of lung    No Known Problems Daughter     No Known Problems Maternal Grandmother     Prostate cancer Maternal Grandfather 80    No Known Problems Paternal Grandmother     No Known Problems Paternal Grandfather     No Known Problems Son     No Known Problems Daughter Meds/Allergies     Current Outpatient Medications:     acetaminophen (TYLENOL) 500 mg tablet, Take 500 mg by mouth every 6 (six) hours as needed for mild pain, Disp: , Rfl:     CALCIUM CITRATE PO, Take by mouth 3 chews per day , Disp: , Rfl:     chlorpheniramine (CHLOR-TRIMETON) 4 MG tablet, Take 1 tablet (4 mg total) by mouth every 6 (six) hours as needed for rhinitis, Disp: 30 tablet, Rfl: 0    Collagen-Vitamin C (COLLAGEN PLUS VITAMIN C PO), Take 1,500 mg by mouth daily  , Disp: , Rfl:     escitalopram (LEXAPRO) 20 mg tablet, Take 1 tablet (20 mg total) by mouth daily, Disp: 30 tablet, Rfl: 5    FIBER ADULT GUMMIES PO, Take by mouth, Disp: , Rfl:     LORazepam (ATIVAN) 0 5 mg tablet, Take 2 tablets (1 mg total) by mouth daily at bedtime, Disp: 90 tablet, Rfl: 1    multivitamin (THERAGRAN) TABS, Take 1 tablet by mouth daily Alive , Disp: , Rfl:     omeprazole (PriLOSEC) 20 mg delayed release capsule, Take 1 capsule (20 mg total) by mouth daily, Disp: 30 capsule, Rfl: 3    polyethylene glycol (MiraLax) 17 GM/SCOOP powder, Take 17 g by mouth every other day  , Disp: , Rfl:     Probiotic Product (PRO-BIOTIC BLEND PO), Take by mouth, Disp: , Rfl:     simethicone (MYLICON) 033 MG chewable tablet, Chew 125 mg every 6 (six) hours as needed for flatulence chews (Patient not taking: Reported on 5/10/2022 ), Disp: , Rfl:     traZODone (DESYREL) 50 mg tablet, Take 1 tablet (50 mg total) by mouth daily at bedtime (Patient not taking: Reported on 5/10/2022 ), Disp: 90 tablet, Rfl: 1    valACYclovir (VALTREX) 500 mg tablet, Take 1 tablet (500 mg total) by mouth every other day, Disp: 45 tablet, Rfl: 1  Allergies   Allergen Reactions    Dayquil [Pseudoephedrine-Dm-Gg-Apap] Rash    Duricef [Cefadroxil] Anaphylaxis    Penicillins Anaphylaxis    Medical Tape Blisters       Vitals: not currently breastfeeding  There is no height or weight on file to calculate BMI          Physical Exam  Physical Exam  Constitutional:       General: She is not in acute distress  Appearance: She is not diaphoretic  HENT:      Head: Normocephalic and atraumatic  Nose: Nose normal       Mouth/Throat:      Pharynx: No oropharyngeal exudate  Eyes:      General: No scleral icterus  Conjunctiva/sclera: Conjunctivae normal       Pupils: Pupils are equal, round, and reactive to light  Neck:      Thyroid: No thyromegaly  Vascular: No JVD  Trachea: No tracheal deviation  Cardiovascular:      Rate and Rhythm: Normal rate and regular rhythm  Heart sounds: Normal heart sounds  No murmur heard  No friction rub  No gallop  Pulmonary:      Effort: Pulmonary effort is normal  No respiratory distress  Breath sounds: Normal breath sounds  No stridor  No wheezing or rales  Abdominal:      General: Bowel sounds are normal  There is no distension  Palpations: Abdomen is soft  Tenderness: There is no abdominal tenderness  There is no guarding or rebound  Musculoskeletal:         General: No deformity  Normal range of motion  Cervical back: Normal range of motion and neck supple  Lymphadenopathy:      Cervical: No cervical adenopathy  Skin:     General: Skin is warm  Findings: No erythema or rash  Neurological:      Mental Status: She is alert and oriented to person, place, and time  Cranial Nerves: No cranial nerve deficit  Sensory: No sensory deficit  Labs: I have personally reviewed pertinent lab results    Lab Results   Component Value Date    WBC 15 14 (H) 01/25/2022    HGB 13 8 01/25/2022    HCT 40 4 01/25/2022    MCV 92 01/25/2022     01/25/2022     Lab Results   Component Value Date    CALCIUM 8 5 01/25/2022    K 5 1 01/25/2022    CO2 28 01/25/2022     01/25/2022    BUN 13 01/25/2022    CREATININE 0 62 01/25/2022     No results found for: IGE  Lab Results   Component Value Date    ALT 37 10/05/2021    AST 20 10/05/2021    ALKPHOS 57 10/05/2021       Imaging and other studies: I have personally reviewed pertinent reports  CT chest- normal bilateral lung parenchyma, resolved lymphadenopathy, some GGO in the RUL    Pulmonary function testing:   Complete pulmonary function testing from February 23, 2021 reviewed  FEV1/FVC ratio 82%  FEV1 90% predicted  FVC 88% predicted  % predicted  % predicted  DLCO 95% predicted  Normal spirometry, air trapping is indicated by the elevated artery, normal diffusion capacity    EKG, Pathology, and Other Studies: I have personally reviewed pertinent reports     and I have personally reviewed pertinent films in PACS    Louis Agosto MD  Pulmonary and Critical Care Fellow- PGY 6  [facility]    Answers for HPI/ROS submitted by the patient on 5/24/2022  Since you first noticed this problem, how has it changed?: resolved  Do you have shortness of breath that occurs with effort or exertion?: No  Do you have ear congestion?: No  Do you have heartburn?: No  Do you have fatigue?: No  Do you have nasal congestion?: No  Do you have shortness of breath when lying flat?: No  Do you have shortness of breath when you wake up?: No  Do you have sweats?: No  Have you experienced weight loss?: Yes

## 2022-05-27 ENCOUNTER — TELEPHONE (OUTPATIENT)
Dept: PULMONOLOGY | Facility: CLINIC | Age: 47
End: 2022-05-27

## 2022-05-27 NOTE — TELEPHONE ENCOUNTER
Patient calling stating you had seen something concerning on her CT that you wanted a second opinion on and advised her you would call her and she never heard from you   She is just looking for an update, Please advise [phone]

## 2022-06-07 NOTE — TELEPHONE ENCOUNTER
Patient is calling, she still has not heard back from you about this CT scan  She said you were going to have it reviewed with Radiology about something you saw in her breast area to see if it was concerning or not  She said it has been about 2 weeks and she thought she was going to hear back from you that same day  Please advise        Patient is going into a meeting with work 3pm-4:30pm  It is okay to leave her a voicemail if for some reason she cannot

## 2022-06-08 NOTE — TELEPHONE ENCOUNTER
Spoke to patient, explained that she can continue with regular mammogram screening   No issues per my discussion with radiology regarding her most recent CT

## 2022-06-20 ENCOUNTER — CONSULT (OUTPATIENT)
Dept: GASTROENTEROLOGY | Facility: CLINIC | Age: 47
End: 2022-06-20
Payer: COMMERCIAL

## 2022-06-20 VITALS
OXYGEN SATURATION: 97 % | DIASTOLIC BLOOD PRESSURE: 82 MMHG | HEIGHT: 63 IN | TEMPERATURE: 99 F | WEIGHT: 217 LBS | RESPIRATION RATE: 18 BRPM | HEART RATE: 68 BPM | SYSTOLIC BLOOD PRESSURE: 120 MMHG | BODY MASS INDEX: 38.45 KG/M2

## 2022-06-20 DIAGNOSIS — K21.9 GASTROESOPHAGEAL REFLUX DISEASE WITHOUT ESOPHAGITIS: ICD-10-CM

## 2022-06-20 DIAGNOSIS — Z98.84 BARIATRIC SURGERY STATUS: ICD-10-CM

## 2022-06-20 DIAGNOSIS — Z48.815 ENCOUNTER FOR SURGICAL AFTERCARE FOLLOWING SURGERY OF DIGESTIVE SYSTEM: ICD-10-CM

## 2022-06-20 DIAGNOSIS — K21.9 GASTROESOPHAGEAL REFLUX DISEASE WITHOUT ESOPHAGITIS: Primary | ICD-10-CM

## 2022-06-20 DIAGNOSIS — K91.2 POSTSURGICAL MALABSORPTION: ICD-10-CM

## 2022-06-20 DIAGNOSIS — Z12.11 COLON CANCER SCREENING: ICD-10-CM

## 2022-06-20 PROCEDURE — 99204 OFFICE O/P NEW MOD 45 MIN: CPT | Performed by: PHYSICIAN ASSISTANT

## 2022-06-20 PROCEDURE — 3008F BODY MASS INDEX DOCD: CPT | Performed by: PHYSICIAN ASSISTANT

## 2022-06-20 PROCEDURE — 1036F TOBACCO NON-USER: CPT | Performed by: PHYSICIAN ASSISTANT

## 2022-06-20 RX ORDER — OMEPRAZOLE 20 MG/1
20 CAPSULE, DELAYED RELEASE ORAL DAILY
Qty: 30 CAPSULE | Refills: 3 | Status: SHIPPED | OUTPATIENT
Start: 2022-06-20

## 2022-06-20 RX ORDER — POLYETHYLENE GLYCOL 3350 17 G/17G
POWDER, FOR SOLUTION ORAL
Qty: 238 G | Refills: 0 | Status: SHIPPED | OUTPATIENT
Start: 2022-06-20 | End: 2022-07-20

## 2022-06-20 NOTE — PATIENT INSTRUCTIONS
Scheduled date of colonoscopy (as of today):07 20 22  Physician performing colonoscopy:DR REBOLLAR  Location of colonoscopy:[facility]  Bowel prep reviewed with patient:DULCOLAX/MIRALAX  Instructions reviewed with patient by:RENATA  Clearances:  N/A

## 2022-06-20 NOTE — PROGRESS NOTES
[facility] - Outpatient Consultation  Jeannine Gracia 48 y o  female MRN: [id number]  Encounter: [id number]      Assessment and Plan    1  Colon cancer screening  The patient has yet to undergo colon cancer screening  She has no family history of colon cancer to her knowledge  At this time she is having daily formed bowel movements without blood  She does have history of some mild constipation prior to her gastric bypass and was taking as needed MiraLax  She does continue to take as needed MiraLax although this has become a lot less frequent since her surgery  She also takes a daily fiber supplement  - discussed colonoscopy in detail including the risks of bleeding, infection, bowel perforation, and missed polyp  - MiraLax/Dulcolax bowel instructions reviewed and provided with the patient    2  GERD  Well controlled on omeprazole 20 mg once daily  EGD prior to her gastric bypass revealed a hiatal hernia and gastritis  Stomach biopsy negative for H pylori  The patient states her hiatal hernia was not repaired during a Michael-en-Y as it was very small   - continue omeprazole 20 mg once daily  - diet/lifestyle modificaiton for GERD prevention    Follow up as needed after colonoscopy     ______________________________________________________________________    History of Present Illness  Jeannine Gracia is a 48 y o  female with a recent history of Michael-en-Y gastric bypass here for consultation of colon cancer screening  The patient has yet to undergo colorectal cancer screening  She denies any family history of colorectal cancer  She states that she has regular daily formed bowel movements without blood  Prior to her gastric bypass she did have history of constipation and she took as needed MiraLax for that  She does have acid reflux currently well controlled on omeprazole 20 mg once daily    EGD prior to her gastric bypass revealed hiatal hernia and gastritis with her stomach biopsy the negative for H pylori        Review of Systems      Past Medical History  Past Medical History:   Diagnosis Date    Anxiety     Depression     Exercise involving walking     30 min daily and light weights    Fatty liver     GERD (gastroesophageal reflux disease)     Heart murmur     "from birth"    Hiatal hernia     History of COVID-19 2020    recovered at home, moderate symptoms    History of pneumonia     History of tobacco abuse 2021    History of UTI     "in 20's and polynephritis"    History of      x2 after 1st C section    Motion sickness     PONV (postoperative nausea and vomiting)     Psychiatric disorder     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    Wears glasses     reading       Past Social history  Past Surgical History:   Procedure Laterality Date    BREAST BIOPSY Right 2004    titanium clip implanted     SECTION      x1    DENTAL IMPLANT      2 lower right    EGD      ENDOMETRIAL ABLATION  2007    GANGLION CYST EXCISION      HAND SURGERY Left     x7 and skin grafts    OVARIAN CYST DRAINAGE      [place] LAP GASTRIC BYPASS/JONATHAN-EN-Y N/A 2022    Procedure: LAPAROSCOPIC JONATHAN-EN-Y GASTRIC BYPASS & INTRAOPERATIVE EGD;  Surgeon: Neal Orozco MD;  Location: [facility];  Service: Bariatrics    REMOVAL OF INTRAUTERINE DEVICE (IUD)      WRIST SURGERY       Social History     Socioeconomic History    Marital status:      Spouse name: Not on file    Number of children: Not on file    Years of education: Not on file    Highest education level: Not on file   Occupational History    Not on file   Tobacco Use    Smoking status: Former Smoker     Packs/day: 1 00     Years: 10 00     Pack years: 10      Types: Cigarettes     Start date:      Quit date:      Years since quitting: 15 4    Smokeless tobacco: Never Used   Vaping Use    Vaping Use: Never used   Substance and Sexual Activity    Alcohol use: Not Currently    Drug use: No    Sexual activity: Not on file     Comment: defer/and pt had ablation"   Other Topics Concern    Not on file   Social History Narrative    Not on file     Social Determinants of Health     Financial Resource Strain: Not on file   Food Insecurity: Not on file   Transportation Needs: Not on file   Physical Activity: Not on file   Stress: Not on file   Social Connections: Not on file   Intimate Partner Violence: Not on file   Housing Stability: Not on file     Social History     Substance and Sexual Activity   Alcohol Use Not Currently     Social History     Substance and Sexual Activity   Drug Use No     Social History     Tobacco Use   Smoking Status Former Smoker    Packs/day: 1 00    Years: 10 00    Pack years: 10 00    Types: Cigarettes    Start date: 10    Quit date: 2007    Years since quitting: 15 4   Smokeless Tobacco Never Used       Past Family History  Family History   Problem Relation Age of Onset    Cancer Mother     Lung cancer Mother 61        small cell lung    Cancer Father     Lung cancer Father 47        adenocarcinoma of lung    No Known Problems Daughter     No Known Problems Maternal Grandmother     Prostate cancer Maternal Grandfather 80    No Known Problems Paternal Grandmother     No Known Problems Paternal Grandfather     No Known Problems Son     No Known Problems Daughter        Current Medications  Current Outpatient Medications   Medication Sig Dispense Refill    acetaminophen (TYLENOL) 500 mg tablet Take 500 mg by mouth every 6 (six) hours as needed for mild pain      CALCIUM CITRATE PO Take by mouth 3 chews per day       chlorpheniramine (CHLOR-TRIMETON) 4 MG tablet Take 1 tablet (4 mg total) by mouth every 6 (six) hours as needed for rhinitis 30 tablet 0    Collagen-Vitamin C (COLLAGEN PLUS VITAMIN C PO) Take 1,500 mg by mouth daily        escitalopram (LEXAPRO) 20 mg tablet Take 1 tablet (20 mg total) by mouth daily 30 tablet 5    FIBER ADULT GUMMIES PO Take by mouth      LORazepam (ATIVAN) 0 5 mg tablet Take 2 tablets (1 mg total) by mouth daily at bedtime 90 tablet 1    multivitamin (THERAGRAN) TABS Take 1 tablet by mouth daily Alive       omeprazole (PriLOSEC) 20 mg delayed release capsule Take 1 capsule (20 mg total) by mouth daily 30 capsule 3    polyethylene glycol (GLYCOLAX) 17 GM/SCOOP powder Take 17 g by mouth every other day        Probiotic Product (PRO-BIOTIC BLEND PO) Take by mouth      simethicone (MYLICON) 204 MG chewable tablet Chew 125 mg every 6 (six) hours as needed for flatulence chews (Patient not taking: No sig reported)      traZODone (DESYREL) 50 mg tablet Take 1 tablet (50 mg total) by mouth daily at bedtime (Patient not taking: No sig reported) 90 tablet 1    valACYclovir (VALTREX) 500 mg tablet Take 1 tablet (500 mg total) by mouth every other day 45 tablet 1     No current facility-administered medications for this visit  Allergies  Allergies   Allergen Reactions    Dayquil [Pseudoephedrine-Dm-Gg-Apap] Rash    Duricef [Cefadroxil] Anaphylaxis    Penicillins Anaphylaxis    Medical Tape Blisters         The following portions of the patient's history were reviewed and updated as appropriate: allergies, current medications, past medical history, past social history, past surgical history and problem list       Vitals  There were no vitals filed for this visit  Physical Exam  Constitutional   General appearance: Patient is seated and in no acute distress, well appearing and well nourished  Head and Face   Head and face: Normal     Eyes   Conjunctiva and lids: No erythema, swelling or discharge  Anicteric  Ears, Nose, Mouth, and Throat   Hearing: Normal     Neck: Supple, trachea midline  Pulmonary   Respiratory effort: No increased work of breathing or signs of respiratory distress      Lungs: Clear to ascultation, no wheezes, rhonchi, or rales  Cardiovascular   Heart: Regular rate and rhythm, no murmurs gallops or rubs   Examination of extremities for edema and/or varicosities: Normal     Abdomen   Abdomen: Soft, non-tender, no masses, no organomegaly  Normal bowel sounds  Musculoskeletal   Gait and station: Normal     Skin   Skin and subcutaneous tissue: Warm, dry, and intact  No visible jaundice, lesions or rashes  Psychiatric   Judgment and insight: Normal  Recent and remote memory:  Normal  Mood and affect: Normal      Results  No visits with results within 1 Day(s) from this visit  Latest known visit with results is:   Admission on 01/24/2022, Discharged on 01/25/2022   Component Date Value    EXT Preg Test, Ur 01/24/2022 Negative     Control 01/24/2022 Valid     Sodium 01/25/2022 134 (A)    Potassium 01/25/2022 5 1     Chloride 01/25/2022 100     CO2 01/25/2022 28     ANION GAP 01/25/2022 6     BUN 01/25/2022 13     Creatinine 01/25/2022 0 62     Glucose 01/25/2022 153 (A)    Calcium 01/25/2022 8 5     eGFR 01/25/2022 106     WBC 01/25/2022 15 14 (A)    RBC 01/25/2022 4 39     Hemoglobin 01/25/2022 13 8     Hematocrit 01/25/2022 40 4     MCV 01/25/2022 92     MCH 01/25/2022 31 4     MCHC 01/25/2022 34 2     RDW 01/25/2022 13 1     Platelets 19/81/7790 289     MPV 01/25/2022 11 1        Radiology Results  No results found  Orders  No orders of the defined types were placed in this encounter

## 2022-06-21 ENCOUNTER — TELEPHONE (OUTPATIENT)
Dept: BARIATRICS | Facility: CLINIC | Age: 47
End: 2022-06-21

## 2022-06-21 LAB
PTH-INTACT SERPL-MCNC: 24 PG/ML (ref 15–65)
ZINC SERPL-MCNC: 94 UG/DL (ref 44–115)

## 2022-06-21 NOTE — TELEPHONE ENCOUNTER
Called patient and let her know that the only 2 labs that we received the results from were in normal limits  Let her know that when receive the results of her other lab work still pending, we will call her and review  the results with her  Patient understood

## 2022-06-21 NOTE — TELEPHONE ENCOUNTER
----- Message from Margaret Spring sent at 6/21/2022 11:31 AM EDT -----  Hello,     Can we please call the patient to let her know I have only received two of her labs - parathyroid and zinc and it was normal limits however, there are still more labs to be done but we're not sure why it wasn't collected       Thanks, RENATO hendrix

## 2022-06-22 LAB
25(OH)D3+25(OH)D2 SERPL-MCNC: 39.9 NG/ML (ref 30–100)
ALBUMIN SERPL-MCNC: 4 G/DL (ref 3.8–4.8)
ALBUMIN/GLOB SERPL: 2 {RATIO} (ref 1.2–2.2)
ALP SERPL-CCNC: 52 IU/L (ref 44–121)
ALT SERPL-CCNC: 15 IU/L (ref 0–32)
AST SERPL-CCNC: 18 IU/L (ref 0–40)
BASOPHILS # BLD AUTO: 0.1 X10E3/UL (ref 0–0.2)
BASOPHILS NFR BLD AUTO: 1 %
BILIRUB SERPL-MCNC: 0.4 MG/DL (ref 0–1.2)
BUN SERPL-MCNC: 15 MG/DL (ref 6–24)
BUN/CREAT SERPL: 22 (ref 9–23)
CALCIUM SERPL-MCNC: 9 MG/DL (ref 8.7–10.2)
CAROTENE SERPL-MCNC: NORMAL UG/DL
CHLORIDE SERPL-SCNC: 103 MMOL/L (ref 96–106)
CO2 SERPL-SCNC: 22 MMOL/L (ref 20–29)
CREAT SERPL-MCNC: 0.69 MG/DL (ref 0.57–1)
EGFR: 107 ML/MIN/1.73
EOSINOPHIL # BLD AUTO: 0.2 X10E3/UL (ref 0–0.4)
EOSINOPHIL NFR BLD AUTO: 2 %
ERYTHROCYTE [DISTWIDTH] IN BLOOD BY AUTOMATED COUNT: 13 % (ref 11.7–15.4)
FERRITIN SERPL-MCNC: 130 NG/ML (ref 15–150)
FOLATE SERPL-MCNC: 14.1 NG/ML
GLOBULIN SER-MCNC: 2 G/DL (ref 1.5–4.5)
GLUCOSE SERPL-MCNC: 103 MG/DL (ref 65–99)
HCT VFR BLD AUTO: 43.8 % (ref 34–46.6)
HGB BLD-MCNC: 14.7 G/DL (ref 11.1–15.9)
IMM GRANULOCYTES # BLD: 0 X10E3/UL (ref 0–0.1)
IMM GRANULOCYTES NFR BLD: 0 %
IRON SATN MFR SERPL: 30 % (ref 15–55)
IRON SERPL-MCNC: 92 UG/DL (ref 27–159)
LYMPHOCYTES # BLD AUTO: 1.9 X10E3/UL (ref 0.7–3.1)
LYMPHOCYTES NFR BLD AUTO: 25 %
MCH RBC QN AUTO: 29.9 PG (ref 26.6–33)
MCHC RBC AUTO-ENTMCNC: 33.6 G/DL (ref 31.5–35.7)
MCV RBC AUTO: 89 FL (ref 79–97)
MONOCYTES # BLD AUTO: 0.5 X10E3/UL (ref 0.1–0.9)
MONOCYTES NFR BLD AUTO: 6 %
NEUTROPHILS # BLD AUTO: 4.9 X10E3/UL (ref 1.4–7)
NEUTROPHILS NFR BLD AUTO: 66 %
PLATELET # BLD AUTO: 273 X10E3/UL (ref 150–450)
POTASSIUM SERPL-SCNC: 4.5 MMOL/L (ref 3.5–5.2)
PROT SERPL-MCNC: 6 G/DL (ref 6–8.5)
PTH-INTACT SERPL-MCNC: 29 PG/ML (ref 15–65)
RBC # BLD AUTO: 4.92 X10E6/UL (ref 3.77–5.28)
SODIUM SERPL-SCNC: 142 MMOL/L (ref 134–144)
TIBC SERPL-MCNC: 308 UG/DL (ref 250–450)
UIBC SERPL-MCNC: 216 UG/DL (ref 131–425)
VIT A SERPL-MCNC: 36.5 UG/DL (ref 20.1–62)
VIT B1 BLD-SCNC: 247.5 NMOL/L (ref 66.5–200)
VIT B12 SERPL-MCNC: 442 PG/ML (ref 232–1245)
WBC # BLD AUTO: 7.5 X10E3/UL (ref 3.4–10.8)
ZINC SERPL-MCNC: 77 UG/DL (ref 44–115)

## 2022-07-11 ENCOUNTER — TELEPHONE (OUTPATIENT)
Dept: BARIATRICS | Facility: CLINIC | Age: 47
End: 2022-07-11

## 2022-07-11 NOTE — TELEPHONE ENCOUNTER
Returned patient's phone call  She wanted to know if she could change to capsule bariatric vitamins and minerals    Returned phone call and told it was fine to switch  Pt appreciative of the call

## 2022-07-20 ENCOUNTER — HOSPITAL ENCOUNTER (OUTPATIENT)
Dept: GASTROENTEROLOGY | Facility: AMBULARY SURGERY CENTER | Age: 47
Setting detail: OUTPATIENT SURGERY
Discharge: HOME | End: 2022-07-20
Payer: COMMERCIAL

## 2022-07-20 ENCOUNTER — ANESTHESIA EVENT (OUTPATIENT)
Dept: GASTROENTEROLOGY | Facility: AMBULARY SURGERY CENTER | Age: 47
End: 2022-07-20

## 2022-07-20 ENCOUNTER — ANESTHESIA (OUTPATIENT)
Dept: GASTROENTEROLOGY | Facility: AMBULARY SURGERY CENTER | Age: 47
End: 2022-07-20

## 2022-07-20 VITALS
DIASTOLIC BLOOD PRESSURE: 57 MMHG | BODY MASS INDEX: 36.86 KG/M2 | RESPIRATION RATE: 16 BRPM | TEMPERATURE: 97.3 F | SYSTOLIC BLOOD PRESSURE: 92 MMHG | HEIGHT: 63 IN | WEIGHT: 208 LBS | OXYGEN SATURATION: 96 % | HEART RATE: 80 BPM

## 2022-07-20 DIAGNOSIS — Z12.11 COLON CANCER SCREENING: ICD-10-CM

## 2022-07-20 PROCEDURE — 88305 TISSUE EXAM BY PATHOLOGIST: CPT | Performed by: PATHOLOGY

## 2022-07-20 PROCEDURE — 45380 COLONOSCOPY AND BIOPSY: CPT | Performed by: INTERNAL MEDICINE

## 2022-07-20 RX ORDER — SODIUM CHLORIDE, SODIUM LACTATE, POTASSIUM CHLORIDE, CALCIUM CHLORIDE 600; 310; 30; 20 MG/100ML; MG/100ML; MG/100ML; MG/100ML
INJECTION, SOLUTION INTRAVENOUS CONTINUOUS PRN
Status: DISCONTINUED | OUTPATIENT
Start: 2022-07-20 | End: 2022-07-20

## 2022-07-20 RX ORDER — PROPOFOL 10 MG/ML
INJECTION, EMULSION INTRAVENOUS AS NEEDED
Status: DISCONTINUED | OUTPATIENT
Start: 2022-07-20 | End: 2022-07-20

## 2022-07-20 RX ADMIN — PROPOFOL 50 MG: 10 INJECTION, EMULSION INTRAVENOUS at 11:36

## 2022-07-20 RX ADMIN — PROPOFOL 50 MG: 10 INJECTION, EMULSION INTRAVENOUS at 11:39

## 2022-07-20 RX ADMIN — PROPOFOL 100 MG: 10 INJECTION, EMULSION INTRAVENOUS at 11:27

## 2022-07-20 RX ADMIN — PROPOFOL 50 MG: 10 INJECTION, EMULSION INTRAVENOUS at 11:28

## 2022-07-20 RX ADMIN — PROPOFOL 50 MG: 10 INJECTION, EMULSION INTRAVENOUS at 11:38

## 2022-07-20 RX ADMIN — PROPOFOL 50 MG: 10 INJECTION, EMULSION INTRAVENOUS at 11:31

## 2022-07-20 RX ADMIN — SODIUM CHLORIDE, SODIUM LACTATE, POTASSIUM CHLORIDE, AND CALCIUM CHLORIDE: .6; .31; .03; .02 INJECTION, SOLUTION INTRAVENOUS at 11:18

## 2022-07-20 RX ADMIN — PROPOFOL 50 MG: 10 INJECTION, EMULSION INTRAVENOUS at 11:29

## 2022-07-20 NOTE — ANESTHESIA POSTPROCEDURE EVALUATION
Post-Op Assessment Note    CV Status:  Stable  Pain Score: 0    Pain management: adequate     Mental Status:  Sleepy   Hydration Status:  Euvolemic   PONV Controlled:  Controlled   Airway Patency:  Patent      Post Op Vitals Reviewed: Yes      Staff: CRNA         No complications documented      BP   97/57   Temp     Pulse  67   Resp   17   SpO2   95

## 2022-07-20 NOTE — H&P
History and Physical - [facility]  Tanna Garcia 48 y o  female MRN: [id number]                  HPI: Tanna Garcia is a 48y o  year old female who presents for colonoscopy for CRC screening      REVIEW OF SYSTEMS: Per the HPI, and otherwise unremarkable      Historical Information   Past Medical History:   Diagnosis Date    Anxiety     Depression     Exercise involving walking     30 min daily and light weights    Fatty liver     GERD (gastroesophageal reflux disease)     Heart murmur     "from birth"    Hiatal hernia     History of COVID-19 2020    recovered at home, moderate symptoms    History of pneumonia     History of tobacco abuse 2021    History of UTI     "in  and polynephritis"    History of      x2 after 1st C section    Motion sickness     PONV (postoperative nausea and vomiting)     Psychiatric disorder     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    Wears glasses     reading     Past Surgical History:   Procedure Laterality Date    BREAST BIOPSY Right 2004    titanium clip implanted     SECTION      x1    DENTAL IMPLANT      2 lower right    EGD      ENDOMETRIAL ABLATION  2007    GANGLION CYST EXCISION      HAND SURGERY Left     x7 and skin grafts    OVARIAN CYST DRAINAGE      [place] LAP GASTRIC BYPASS/JONATHAN-EN-Y N/A 2022    Procedure: LAPAROSCOPIC JONATHAN-EN-Y GASTRIC BYPASS & INTRAOPERATIVE EGD;  Surgeon: Sindi Olmstead MD;  Location: [facility];  Service: Bariatrics    REMOVAL OF INTRAUTERINE DEVICE (IUD)      WRIST SURGERY       Social History   Social History     Substance and Sexual Activity   Alcohol Use Not Currently     Social History     Substance and Sexual Activity   Drug Use No     Social History     Tobacco Use   Smoking Status Former Smoker    Packs/day: 1 00    Years: 10 00    Pack years: 10 00    Types: Cigarettes    Start date:     Quit date:     Years since quitting: 15 5   Smokeless Tobacco Never Used     Family History   Problem Relation Age of Onset   [facility] Cancer Mother     Lung cancer Mother 61        small cell lung    Cancer Father     Lung cancer Father 47        adenocarcinoma of lung    No Known Problems Daughter     No Known Problems Maternal Grandmother     Prostate cancer Maternal Grandfather 80    No Known Problems Paternal Grandmother     No Known Problems Paternal Grandfather     No Known Problems Son     No Known Problems Daughter        Meds/Allergies       Current Outpatient Medications:     CALCIUM CITRATE PO    Collagen-Vitamin C (COLLAGEN PLUS VITAMIN C PO)    escitalopram (LEXAPRO) 20 mg tablet    FIBER ADULT GUMMIES PO    LORazepam (ATIVAN) 0 5 mg tablet    multivitamin (THERAGRAN) TABS    omeprazole (PriLOSEC) 20 mg delayed release capsule    Probiotic Product (PRO-BIOTIC BLEND PO)    simethicone (MYLICON) 154 MG chewable tablet    valACYclovir (VALTREX) 500 mg tablet    acetaminophen (TYLENOL) 500 mg tablet    traZODone (DESYREL) 50 mg tablet    Allergies   Allergen Reactions    Dayquil [Pseudoephedrine-Dm-Gg-Apap] Rash    Duricef [Cefadroxil] Anaphylaxis    Penicillins Anaphylaxis    Medical Tape Blisters       Objective     /70   Pulse 71   Temp (!) 96 6 °F (35 9 °C) (Temporal)   Resp 18   Ht 5' 3" (1 6 m)   Wt 94 3 kg (208 lb)   SpO2 98%   BMI 36 85 kg/m²       PHYSICAL EXAM    Gen: NAD  Head: NCAT  CV: RRR  CHEST: Clear  ABD: soft, NT/ND  EXT: no edema      ASSESSMENT/PLAN:  This is a 48y o  year old female here for colonoscopy, and she is stable and optimized for her procedure

## 2022-07-20 NOTE — ANESTHESIA PREPROCEDURE EVALUATION
Procedure:  COLONOSCOPY    Relevant Problems   CARDIO   (+) Hyperlipidemia      GI/HEPATIC   (+) Gastroesophageal reflux disease without esophagitis      NEURO/PSYCH   (+) Anxiety   (+) Depression, recurrent (HCC)   (+) History of syndactyly   (+) Recurrent major depressive disorder (HCC)      Other   (+) Mediastinal lymphadenopathy        Physical Exam    Airway    Mallampati score: III  TM Distance: >3 FB  Neck ROM: full     Dental   No notable dental hx     Cardiovascular  Cardiovascular exam normal    Pulmonary  Pulmonary exam normal     Other Findings        Anesthesia Plan  ASA Score- 2     Anesthesia Type- IV sedation with anesthesia with ASA Monitors  Additional Monitors:   Airway Plan:           Plan Factors-Exercise tolerance (METS): >4 METS  Chart reviewed  Patient summary reviewed  Patient is not a current smoker  Induction- intravenous  Postoperative Plan-     Informed Consent- Anesthetic plan and risks discussed with patient

## 2022-07-21 DIAGNOSIS — F41.9 ANXIETY: ICD-10-CM

## 2022-07-21 RX ORDER — LORAZEPAM 0.5 MG/1
1 TABLET ORAL
Qty: 90 TABLET | Refills: 0 | Status: SHIPPED | OUTPATIENT
Start: 2022-07-21 | End: 2022-09-21 | Stop reason: SDUPTHER

## 2022-08-03 ENCOUNTER — TELEPHONE (OUTPATIENT)
Dept: GASTROENTEROLOGY | Facility: CLINIC | Age: 47
End: 2022-08-03

## 2022-08-03 DIAGNOSIS — R93.3 ABNORMAL COLONOSCOPY: Primary | ICD-10-CM

## 2022-08-03 NOTE — TELEPHONE ENCOUNTER
Patients GI provider:  Dr Jose L Marsh    Number to return call: [phone]    Reason for call: Pt calling asking if she should schedule her follow up appt first or if she needs her CT scan first     Scheduled procedure/appointment date if applicable: N/A

## 2022-08-05 ENCOUNTER — TELEPHONE (OUTPATIENT)
Dept: GASTROENTEROLOGY | Facility: CLINIC | Age: 47
End: 2022-08-05

## 2022-08-05 NOTE — TELEPHONE ENCOUNTER
Spoke to patient and let her know that the [facility] team will work on her prior auth and if for any reason it would be denied they will call her to cancel or we will harrison her  I also informed her that she can go to the location that is closest radiology location to her to  barium

## 2022-08-05 NOTE — TELEPHONE ENCOUNTER
Patients GI provider:  Dr Rylan Licea    Number to return call: [phone]      Reason for call: Pt calling stating she needs pre certification for her CT scan scheduled on 8/11/2022  She states her insurance will pay for all of it, she just needs the pre certification  Pt states she also needs a script sent over to her pharmacy for barium  Please reach out to pt regarding this matter as she would like to updated      Scheduled procedure/appointment date if applicable: Appt: 6/45/6456

## 2022-08-09 NOTE — TELEPHONE ENCOUNTER
Patient called to let Dr Filiberto Mccullough know she had some vaginal bleeding on 8/6/22 after intercourse that has subsided and she is still having the abdominal pain s/p colonoscopy  She would like to make sure that will all be checked out as well  She would like a call back to follow up

## 2022-08-09 NOTE — TELEPHONE ENCOUNTER
Spoke with pt and she explained she has had mild lower abdomen discomfort since colonoscopy  She stated she is aware of her results and I better explained that it is possible colitis may be playing a role in the pain she is experiencing however, we advise keeping CT appt and f/u OV  Pt agreeable and explained she is not concerned with abdominal pain but rather vaginal bleeding and if it is related to colitis  I explained it is possible but unlikely  She explained she had sex recently and hasnt done so in a long time  After intercourse she did experience bleeding and increase in abdominal pain  She inquired if GI was related in any way to this and if upcoming imaging would show anything  Pt confirmed she no longer has periods  I went over with pt what we would see on CT of abdomen and pelvis but I also explained that a GYN issue may exacerbate abdominal pain and it can be difficult to decipher what is actually causing pain  I advised seeing OBGYN and provided her with number  I also recommended informing them she does have CT scheduled and is f/u with us next week

## 2022-08-10 ENCOUNTER — OFFICE VISIT (OUTPATIENT)
Dept: BARIATRICS | Facility: CLINIC | Age: 47
End: 2022-08-10
Payer: COMMERCIAL

## 2022-08-10 VITALS
TEMPERATURE: 98 F | HEART RATE: 68 BPM | BODY MASS INDEX: 36.59 KG/M2 | HEIGHT: 63 IN | DIASTOLIC BLOOD PRESSURE: 78 MMHG | WEIGHT: 206.5 LBS | SYSTOLIC BLOOD PRESSURE: 110 MMHG

## 2022-08-10 DIAGNOSIS — K21.9 GASTROESOPHAGEAL REFLUX DISEASE WITHOUT ESOPHAGITIS: ICD-10-CM

## 2022-08-10 DIAGNOSIS — Z48.815 ENCOUNTER FOR SURGICAL AFTERCARE FOLLOWING SURGERY OF DIGESTIVE SYSTEM: Primary | ICD-10-CM

## 2022-08-10 DIAGNOSIS — Z98.84 BARIATRIC SURGERY STATUS: ICD-10-CM

## 2022-08-10 DIAGNOSIS — E66.9 OBESITY, CLASS II, BMI 35-39.9: ICD-10-CM

## 2022-08-10 DIAGNOSIS — K91.2 POSTSURGICAL MALABSORPTION: ICD-10-CM

## 2022-08-10 DIAGNOSIS — R14.0 ABDOMINAL BLOATING: ICD-10-CM

## 2022-08-10 PROCEDURE — 99213 OFFICE O/P EST LOW 20 MIN: CPT | Performed by: NURSE PRACTITIONER
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
## 2022-08-10 NOTE — PATIENT INSTRUCTIONS
[facility]   OB-GYN Office is there  Wean off omeprazole for now  Follow up in 6 months for annual visit

## 2022-08-11 ENCOUNTER — HOSPITAL ENCOUNTER (OUTPATIENT)
Dept: RADIOLOGY | Facility: HOSPITAL | Age: 47
Discharge: HOME | End: 2022-08-11
Payer: COMMERCIAL

## 2022-08-11 DIAGNOSIS — R93.3 ABNORMAL COLONOSCOPY: ICD-10-CM

## 2022-08-11 PROCEDURE — G1004 CDSM NDSC: HCPCS

## 2022-08-11 PROCEDURE — 74177 CT ABD & PELVIS W/CONTRAST: CPT

## 2022-08-11 RX ADMIN — IOHEXOL 65 ML: 350 INJECTION, SOLUTION INTRAVENOUS at 08:28

## 2022-08-18 ENCOUNTER — OFFICE VISIT (OUTPATIENT)
Dept: GASTROENTEROLOGY | Facility: CLINIC | Age: 47
End: 2022-08-18
Payer: COMMERCIAL

## 2022-08-18 VITALS
RESPIRATION RATE: 18 BRPM | SYSTOLIC BLOOD PRESSURE: 116 MMHG | HEART RATE: 68 BPM | WEIGHT: 207 LBS | OXYGEN SATURATION: 97 % | BODY MASS INDEX: 36.68 KG/M2 | TEMPERATURE: 99 F | HEIGHT: 63 IN | DIASTOLIC BLOOD PRESSURE: 70 MMHG

## 2022-08-18 DIAGNOSIS — R93.89 ABNORMAL CT SCAN: ICD-10-CM

## 2022-08-18 DIAGNOSIS — R93.3 ABNORMAL COLONOSCOPY: ICD-10-CM

## 2022-08-18 DIAGNOSIS — Z98.84 HISTORY OF ROUX-EN-Y GASTRIC BYPASS: Primary | ICD-10-CM

## 2022-08-18 PROCEDURE — 99214 OFFICE O/P EST MOD 30 MIN: CPT | Performed by: INTERNAL MEDICINE

## 2022-08-18 NOTE — PROGRESS NOTES
[facility] - Outpatient Consultation  Tony Lozoya 48 y o  female MRN: [id number]  Encounter: [id number]          ASSESSMENT AND PLAN:      1  History of Michael-en-Y gastric bypass  2  Abnormal colonoscopy  - Ambulatory Referral to General Surgery; Future  3  Abnormal CT scan  - Ambulatory Referral to General Surgery; Future    53-year-old female presenting for follow-up after recent abnormal screening colonoscopy including findings of submucosal lesion at the appendiceal orifice as well as mild ascending colon colitis  Biopsies of submucosal lesion were benign, ascending colon biopsies with mild chronic active colitis  Regarding this submucosal lesion at the appendiceal orifice, it appeared to be mucin secreting, making a mucocele possible in the differential   An atypical lipoma is also possible  Given the follow-up CT scan demonstrating fluid-filled appendix along with colonoscopy finding, I will provide a referral to General surgery for their opinion on indications for appendectomy  Regarding finding of segmental mild colitis, this is not consistent with either Crohn's disease or ulcerative colitis  This may be possibly transient from infection versus chronic nonspecific colitis  At this time in the absence of diarrhea or other concerning symptoms I would hold off on treatment  However if she develops worsening GI complaints, could consider starting low dose mesalamine    Follow up in 6 months or sooner if worsened symptoms    ______________________________________________________________________    HPI: Ms Juve Don is a pleasant 53-year-old female status post gastric bypass presenting for follow-up after recent screening colonoscopy  Colonoscopy did not reveal any polyps or colon cancer  However incidentally found was mild ascending colon colitis, biopsies consistent with mild chronic active colitis    In addition at the appendiceal orifice there was roughly 1 cm submucosal lesion that appeared to be mucin secreting  She had a follow-up CT scan with contrast of the abdomen and pelvis which demonstrated a fluid-filled but nondilated appendix  There is no local lymphadenopathy  She has mild GI complaints including bloating, however no rectal bleeding or regular diarrhea  She is doing well from the perspective of being post gastric bypass on 2022  She recently followed up with bariatric office and is tapering off PPI  REVIEW OF SYSTEMS:    CONSTITUTIONAL: Denies any fever, chills, rigors, and weight loss  HEENT: Denies odynophagia, tinnitus  CARDIOVASCULAR: No chest pain or palpitations  RESPIRATORY: Denies any cough, hemoptysis, shortness of breath or dyspnea on exertion  GASTROINTESTINAL: As noted in the History of Present Illness  GENITOURINARY: No problems with urination  Denies any hematuria or dysuria  NEUROLOGIC: No dizziness or vertigo, denies headaches  MUSCULOSKELETAL: Denies any muscle or joint pain  SKIN: Denies skin rashes or itching  ENDOCRINE:  Denies intolerance to heat or cold  PSYCHOSOCIAL: Denies depression or anxiety  Denies any recent memory loss         Historical Information   Past Medical History:   Diagnosis Date    Anxiety     Depression     Exercise involving walking     30 min daily and light weights    Fatty liver     GERD (gastroesophageal reflux disease)     Heart murmur     "from birth"    Hiatal hernia     History of COVID-19 2020    recovered at home, moderate symptoms    History of pneumonia     History of tobacco abuse 2021    History of UTI     "in 20's and polynephritis"    History of      x2 after 1st C section    Motion sickness     PONV (postoperative nausea and vomiting)     Psychiatric disorder     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    Wears glasses     reading     Past Surgical History:   Procedure Laterality Date    BREAST BIOPSY Right 2004    titanium clip implanted     SECTION      x1    DENTAL IMPLANT      2 lower right    EGD      ENDOMETRIAL ABLATION  2007    GANGLION CYST EXCISION      HAND SURGERY Left     x7 and skin grafts    OVARIAN CYST DRAINAGE      NC LAP GASTRIC BYPASS/JONATHAN-EN-Y N/A 2022    Procedure: LAPAROSCOPIC JONATHAN-EN-Y GASTRIC BYPASS & INTRAOPERATIVE EGD;  Surgeon: Felicitas Weathers MD;  Location: [facility] OR;  Service: [address] [place] [address] [address]      WRIST SURGERY       Social History   Social History     Substance and Sexual Activity   Alcohol Use Not Currently     Social History     Substance and Sexual Activity   Drug Use No     Social History     Tobacco Use   Smoking Status Former Smoker    Packs/day:  00    Years: 10 00    Pack years: 10 00    Types: Cigarettes    Start date:     Quit date:     Years since quitting: 15 6   Smokeless Tobacco Never Used     Family History   Problem Relation Age of Onset    Cancer Mother     Lung cancer Mother 61        small cell lung    Cancer Father     Lung cancer Father 47        adenocarcinoma of lung    No Known Problems Daughter     No Known Problems Maternal Grandmother     Prostate cancer Maternal Grandfather 80    No Known Problems Paternal Grandmother     No Known Problems Paternal Grandfather     No Known Problems Son     No Known Problems Daughter        Meds/Allergies       Current Outpatient Medications:     CALCIUM CITRATE PO    Collagen-Vitamin C (COLLAGEN PLUS VITAMIN C PO)    escitalopram (LEXAPRO) 20 mg tablet    FIBER ADULT GUMMIES PO    LORazepam (ATIVAN) 0 5 mg tablet    multivitamin (THERAGRAN) TABS    omeprazole (PriLOSEC) 20 mg delayed release capsule    simethicone (MYLICON) 563 MG chewable tablet    valACYclovir (VALTREX) 500 mg tablet    Probiotic Product (PRO-BIOTIC BLEND PO)    Allergies   Allergen Reactions    Dayquil [Pseudoephedrine-Dm-Gg-Apap] Rash    Duricef [Cefadroxil] Anaphylaxis    Penicillins Anaphylaxis    Medical Tape Blisters           Objective     Blood pressure 116/70, pulse 68, temperature 99 °F (37 2 °C), temperature source Tympanic, resp  rate 18, height 5' 3" (1 6 m), weight 93 9 kg (207 lb), SpO2 97 %, not currently breastfeeding  Body mass index is 36 67 kg/m²  PHYSICAL EXAM:      General Appearance:   Alert, cooperative, no distress   HEENT:   Normocephalic, atraumatic, anicteric  Neck:  Supple, symmetrical, trachea midline   Lungs:   Clear to auscultation bilaterally; no rales, rhonchi or wheezing; respirations unlabored    Heart[de-identified]   Regular rate and rhythm; no murmur, rub, or gallop  Abdomen:   Soft, non-tender, non-distended; normal bowel sounds; no masses, no organomegaly    Genitalia:   Deferred    Rectal:   Deferred    Extremities:  No cyanosis, clubbing or edema    Pulses:  2+ and symmetric    Skin:  No jaundice, rashes, or lesions          Lab Results:   No visits with results within 1 Day(s) from this visit  Latest known visit with results is:   Hospital Outpatient Visit on 07/20/2022   Component Date Value    Case Report 07/20/2022                      Value:Surgical Pathology Report                         Case: [id number]                                   Authorizing Provider:  Carol Santamaria DO    Collected:           07/20/2022 1136              Ordering Location:     [facility]        Received:            07/20/2022 [facility]                                                    Pathologist:           Se Shukla MD                                                    Specimens:   A) - appendiceal nodule biopsy cold forcep                                                          B) - Colon, ascending colon biopsy r/o colitis                                             Final Diagnosis 07/20/2022                      Value: This result contains rich text formatting which cannot be displayed here   Additional Information 07/20/2022                      Value: This result contains rich text formatting which cannot be displayed here  Sarah Velásquez Description 07/20/2022                      Value: This result contains rich text formatting which cannot be displayed here  Radiology Results:   Colonoscopy    Result Date: 7/20/2022  Narrative: Rachael Lutz & Laurie Faulkner [phone] DATE OF SERVICE: 7/20/22 PHYSICIAN(S): Attending: Juanita Mccarthy DO Fellow: No Staff Documented INDICATION: Colon cancer screening POST-OP DIAGNOSIS: See the impression below  HISTORY: Prior colonoscopy: No prior colonoscopy  BOWEL PREPARATION: Miralax/Dulcolax PREPROCEDURE: Informed consent was obtained for the procedure, including sedation  Risks including but not limited to bleeding, infection, perforation, adverse drug reaction and aspiration were explained in detail  Also explained about less than 100% sensitivity with the exam and other alternatives  The patient was placed in the left lateral decubitus position  DETAILS OF PROCEDURE: Patient was taken to the procedure room where a time out was performed to confirm correct patient and correct procedure  The patient underwent monitored anesthesia care, which was administered by an anesthesia professional  The patient's blood pressure, heart rate, level of consciousness, oxygen and respirations were monitored throughout the procedure  A digital rectal exam was performed  The scope was introduced through the anus and advanced to the cecum  Retroflexion was performed in the rectum  The quality of bowel preparation was evaluated using the [facility] Bowel Preparation Scale with scores of: right colon = 2, transverse colon = 2, left colon = 2  The total BBPS score was 6  Bowel prep was adequate  The patient experienced no blood loss  The procedure was not difficult   The patient tolerated the procedure well  There were no apparent complications  ANESTHESIA INFORMATION: ASA: II Anesthesia Type: IV Sedation with Anesthesia MEDICATIONS: No administrations occurring from 1114 to 1149 on 07/20/22 FINDINGS: Single nodule in the cecum; performed cold forceps biopsy Mild, generalized granular mucosa with loss of vascular pattern in the ascending colon; performed cold forceps biopsy EVENTS: Procedure Events Event Event Time ENDO CECUM REACHED 7/20/2022 11:33 AM ENDO SCOPE OUT TIME 7/20/2022 11:40 AM SPECIMENS: ID Type Source Tests Collected by Time Destination 1 : appendiceal nodule biopsy cold forcep Tissue Appendiceal orifice TISSUE EXAM Azul Baldwin DO 7/20/2022 11:36 AM  2 : ascending colon biopsy r/o colitis Tissue Colon TISSUE EXAM Azul Baldwin DO 7/20/2022 11:37 AM  EQUIPMENT: Colonoscope - [id number]     Impression: Submucosal nodule at the appendiceal orifice, lipoma or possible mucocele  Biopsied Mild changes to the ascending colon, biopsied to rule out colitis RECOMMENDATION: Repeat screening colonoscopy in 10 years or earlier if clinically indicated Await pathology  Ana Paula Baldwin DO     CT abdomen pelvis w contrast    Result Date: 8/14/2022  Narrative: CT ABDOMEN AND PELVIS WITH IV CONTRAST INDICATION:   R93 3: Abnormal findings on diagnostic imaging of other parts of digestive tract  COMPARISON:  CT abdomen/pelvis dated July 18, 2021  TECHNIQUE:  CT examination of the abdomen and pelvis was performed  Axial, sagittal, and coronal 2D reformatted images were created from the source data and submitted for interpretation  Radiation dose length product (DLP) for this visit:  720 81 mGy-cm   This examination, like all CT scans performed in the [facility], was performed utilizing techniques to minimize radiation dose exposure, including the use of iterative  reconstruction and automated exposure control   IV Contrast:  65 mL of iohexol (OMNIPAQUE) Enteric Contrast:  Enteric contrast was administered  FINDINGS: ABDOMEN LOWER CHEST:  No clinically significant abnormality identified in the visualized lower chest  LIVER/BILIARY TREE:  Liver is diffusely decreased in density consistent with fatty change  No CT evidence of suspicious hepatic mass  Normal hepatic contours  No biliary dilatation  GALLBLADDER:  No calcified gallstones  No pericholecystic inflammatory change  SPLEEN:  Unremarkable  PANCREAS:  Unremarkable  ADRENAL GLANDS:  Unremarkable  KIDNEYS/URETERS:  Unremarkable  No hydronephrosis  STOMACH AND BOWEL:  Postoperative changes of Michael-en-Y gastric bypass surgery are present  There is no evidence of large or small bowel obstruction  There is a small sliding hiatal hernia  APPENDIX:  A fluid-filled nondilated appendix is noted with no periappendiceal inflammatory stranding  ABDOMINOPELVIC CAVITY:  No ascites  No pneumoperitoneum  No lymphadenopathy  VESSELS:  Unremarkable for patient's age  PELVIS REPRODUCTIVE ORGANS:  Unremarkable for patient's age  URINARY BLADDER:  Unremarkable  ABDOMINAL WALL/INGUINAL REGIONS:  Unremarkable  OSSEOUS STRUCTURES:  No acute fracture or destructive osseous lesion  Impression: No acute intra-abdominal abnormality  Nondilated fluid-filled appendix noted  No periappendiceal inflammatory stranding  Postoperative changes of prior gastric bypass surgery  No large or small bowel obstruction  No intra-abdominal or pelvic lymphadenopathy or focal fluid collection   Workstation performed: [id number]   Answers for HPI/ROS submitted by the patient on 8/16/2022  Chronicity: recurrent  Onset: 1 to 4 weeks ago  Onset quality: gradual  Frequency: daily  Progression since onset: waxing and waning  Pain location: right flank  Pain - numeric: 4/10  Pain quality: dull, a sensation of fullness  Radiates to: RLQ  anorexia: Yes  arthralgias: No  belching: Yes  constipation: Yes  diarrhea: Yes  dysuria: No  fever: No  flatus: Yes  frequency: No  headaches: No  hematochezia: No  hematuria: No  melena: No  myalgias: Yes  nausea: Yes  weight loss: Yes  vomiting: Yes  Aggravated by: certain positions, movement  Relieved by: nothing  Diagnostic workup: CT scan, lower endoscopy, upper endoscopy

## 2022-08-22 ENCOUNTER — OFFICE VISIT (OUTPATIENT)
Dept: GYNECOLOGY | Facility: CLINIC | Age: 47
End: 2022-08-22
Payer: COMMERCIAL

## 2022-08-22 VITALS
SYSTOLIC BLOOD PRESSURE: 110 MMHG | BODY MASS INDEX: 35.1 KG/M2 | HEIGHT: 64 IN | HEART RATE: 79 BPM | WEIGHT: 205.6 LBS | DIASTOLIC BLOOD PRESSURE: 78 MMHG

## 2022-08-22 DIAGNOSIS — Z12.4 ENCOUNTER FOR PAPANICOLAOU SMEAR FOR CERVICAL CANCER SCREENING: ICD-10-CM

## 2022-08-22 DIAGNOSIS — Z01.411 ENCOUNTER FOR GYNECOLOGICAL EXAMINATION (GENERAL) (ROUTINE) WITH ABNORMAL FINDINGS: Primary | ICD-10-CM

## 2022-08-22 DIAGNOSIS — Z12.31 SCREENING MAMMOGRAM FOR BREAST CANCER: ICD-10-CM

## 2022-08-22 DIAGNOSIS — Z11.3 ROUTINE SCREENING FOR STI (SEXUALLY TRANSMITTED INFECTION): ICD-10-CM

## 2022-08-22 DIAGNOSIS — R10.2 PELVIC PAIN: ICD-10-CM

## 2022-08-22 PROCEDURE — 99386 PREV VISIT NEW AGE 40-64: CPT | Performed by: OBSTETRICS & GYNECOLOGY

## 2022-08-22 PROCEDURE — G0124 SCREEN C/V THIN LAYER BY MD: HCPCS | Performed by: PATHOLOGY

## 2022-08-22 PROCEDURE — 81514 NFCT DS BV&VAGINITIS DNA ALG: CPT | Performed by: OBSTETRICS & GYNECOLOGY

## 2022-08-22 PROCEDURE — 87591 N.GONORRHOEAE DNA AMP PROB: CPT | Performed by: OBSTETRICS & GYNECOLOGY

## 2022-08-22 PROCEDURE — G0476 HPV COMBO ASSAY CA SCREEN: HCPCS | Performed by: OBSTETRICS & GYNECOLOGY

## 2022-08-22 PROCEDURE — 87491 CHLMYD TRACH DNA AMP PROBE: CPT | Performed by: OBSTETRICS & GYNECOLOGY

## 2022-08-22 PROCEDURE — G0145 SCR C/V CYTO,THINLAYER,RESCR: HCPCS | Performed by: PATHOLOGY

## 2022-08-22 PROCEDURE — 0503F POSTPARTUM CARE VISIT: CPT | Performed by: OBSTETRICS & GYNECOLOGY

## 2022-08-22 NOTE — PROGRESS NOTES
Assessment/Plan:    Script given for TVU  Will notify pt of results  Advised monthly SBE, annual CBE and continued annual mammography  Reviewed ASCCP guidelines and recommended pap with cotesting q3 yrs for this low risk patient  Pap done today  GC/CT added to pap, vaginal cultures done per pt request, STI blood work script given  Will notify pt of results  RTO in one year or sooner PRN  Diagnoses and all orders for this visit:    Encounter for gynecological examination (general) (routine) with abnormal findings    Screening mammogram for breast cancer  -     Mammo screening bilateral w 3d & cad; Future    Pelvic pain  -     US pelvis complete w transvaginal; Future  -     Molecular Vaginal Panel    Routine screening for STI (sexually transmitted infection)  -     Hepatitis B e antigen; Future  -     HIV 1/2 Antigen/Antibody (4th Generation) w Reflex [facility]; Future  -     RPR; Future  -     Molecular Vaginal Panel  -     Chlamydia/GC amplified DNA by PCR    Encounter for Papanicolaou smear for cervical cancer screening  -     Liquid-based pap, screening        Subjective:      Patient ID: Eliane Coates is a 48 y o  female  This new patient presents for routine annual gyn exam with a few concerns  History of ablation many years ago  No menses since ablation  Concerns with PCB with new partner a couple of months ago  PCB occurred on two separate occasions  This was the first time she was sexually active in 1 1/2 years  Would like STI testing as well as cultures for BV/yeast  She is aware of possible financial responsibility  She also reports pelvic pain for a few months and is concerned because a hx of ovarian cyst which required surgical removal  A recent CT scan revealed normal reproductive organs  She has a surgical consult tomorrow for appendectomy pp  She denies breast concerns, although she states Dr Edmonds Dad mentioned a possible abnormality in chest CT from 5/2022   I do not see reference to that in imaging or notes  She states she was told she could wait until her screening mammography which is due in September  Of note, patient had recent 75 lb weight loss since gastric bypass and counseled on need for further imaging given possible changes in breast tissue following that amount of weight loss  She denies abnormal discharge, bowel/bladder dysfunction  Pap/HPV normal 8/2020  Has had new partner since that time  Last Mammography normal, 9/13/21  The following portions of the patient's history were reviewed and updated as appropriate: allergies, current medications, past family history, past medical history, past social history, past surgical history and problem list     Review of Systems   Constitutional: Negative  HENT: Negative  Respiratory: Negative  Cardiovascular: Negative  Gastrointestinal: Negative  Endocrine: Negative  Genitourinary: Positive for pelvic pain and vaginal bleeding  Negative for difficulty urinating, dyspareunia, dysuria, frequency, menstrual problem, urgency, vaginal discharge and vaginal pain  Musculoskeletal: Negative  Skin: Negative  Neurological: Negative  Psychiatric/Behavioral: Negative  Objective:      /78   Pulse 79   Ht 5' 3 75" (1 619 m)   Wt 93 3 kg (205 lb 9 6 oz)   BMI 35 57 kg/m²          Physical Exam  Vitals and nursing note reviewed  Exam conducted with a chaperone present  Constitutional:       Appearance: Normal appearance  She is well-developed  HENT:      Head: Normocephalic and atraumatic  Neck:      Thyroid: No thyroid mass or thyromegaly  Cardiovascular:      Rate and Rhythm: Normal rate and regular rhythm  Heart sounds: Normal heart sounds  Pulmonary:      Effort: Pulmonary effort is normal       Breath sounds: Normal breath sounds  Chest:   Breasts: Breasts are symmetrical       Right: No inverted nipple, mass, nipple discharge, skin change or tenderness  Left: No inverted nipple, mass, nipple discharge, skin change or tenderness  Abdominal:      General: Bowel sounds are normal       Palpations: Abdomen is soft  Tenderness: There is no abdominal tenderness  Hernia: There is no hernia in the left inguinal area or right inguinal area  Genitourinary:     General: Normal vulva  Exam position: Supine  Pubic Area: No rash  Labia:         Right: No rash, tenderness, lesion or injury  Left: No rash, tenderness, lesion or injury  Urethra: No prolapse, urethral pain, urethral swelling or urethral lesion  Vagina: No signs of injury and foreign body  Vaginal discharge (moderate amount of thin white discharge) present  No erythema, tenderness, bleeding, lesions or prolapsed vaginal walls  Cervix: Friability present  No cervical motion tenderness, lesion, erythema, cervical bleeding or eversion  Uterus: Not deviated, not enlarged, not fixed, not tender and no uterine prolapse  Adnexa:         Right: No mass, tenderness or fullness  Left: No mass, tenderness or fullness  Rectum: No external hemorrhoid  Comments: Urethra normal without lesions  No bladder tenderness  Musculoskeletal:         General: Normal range of motion  Cervical back: Normal range of motion and neck supple  Lymphadenopathy:      Lower Body: No right inguinal adenopathy  No left inguinal adenopathy  Skin:     General: Skin is warm and dry  Neurological:      Mental Status: She is alert and oriented to person, place, and time  Psychiatric:         Speech: Speech normal          Behavior: Behavior normal  Behavior is cooperative

## 2022-08-23 ENCOUNTER — TELEPHONE (OUTPATIENT)
Dept: GYNECOLOGY | Facility: CLINIC | Age: 47
End: 2022-08-23

## 2022-08-23 DIAGNOSIS — B96.89 BV (BACTERIAL VAGINOSIS): Primary | ICD-10-CM

## 2022-08-23 DIAGNOSIS — N76.0 BV (BACTERIAL VAGINOSIS): Primary | ICD-10-CM

## 2022-08-23 LAB
C GLABRATA DNA VAG QL NAA+PROBE: NEGATIVE
C KRUSEI DNA VAG QL NAA+PROBE: NEGATIVE
C TRACH DNA SPEC QL NAA+PROBE: NEGATIVE
CANDIDA SP 6 PNL VAG NAA+PROBE: NEGATIVE
HBV E AG SERPL QL IA: NEGATIVE
HIV 1+2 AB+HIV1 P24 AG SERPL QL IA: NON REACTIVE
HPV HR 12 DNA CVX QL NAA+PROBE: POSITIVE
HPV16 DNA CVX QL NAA+PROBE: POSITIVE
HPV18 DNA CVX QL NAA+PROBE: NEGATIVE
N GONORRHOEA DNA SPEC QL NAA+PROBE: NEGATIVE
RPR SER QL: NON REACTIVE
T VAGINALIS DNA VAG QL NAA+PROBE: NEGATIVE
VAGINOSIS/ITIS DNA PNL VAG PROBE+SIG AMP: POSITIVE

## 2022-08-23 RX ORDER — METRONIDAZOLE 7.5 MG/G
1 GEL VAGINAL
Qty: 70 G | Refills: 0 | Status: SHIPPED | OUTPATIENT
Start: 2022-08-23 | End: 2022-08-28

## 2022-08-25 ENCOUNTER — CONSULT (OUTPATIENT)
Dept: SURGERY | Facility: HOSPITAL | Age: 47
End: 2022-08-25
Payer: COMMERCIAL

## 2022-08-25 ENCOUNTER — TELEPHONE (OUTPATIENT)
Dept: GYNECOLOGY | Facility: CLINIC | Age: 47
End: 2022-08-25

## 2022-08-25 VITALS
HEIGHT: 63 IN | TEMPERATURE: 98.3 F | DIASTOLIC BLOOD PRESSURE: 80 MMHG | WEIGHT: 203.2 LBS | SYSTOLIC BLOOD PRESSURE: 130 MMHG | RESPIRATION RATE: 16 BRPM | HEART RATE: 74 BPM | BODY MASS INDEX: 36 KG/M2

## 2022-08-25 DIAGNOSIS — R93.3 ABNORMAL COLONOSCOPY: ICD-10-CM

## 2022-08-25 DIAGNOSIS — K38.8 MUCOCELE OF APPENDIX: Primary | ICD-10-CM

## 2022-08-25 DIAGNOSIS — R10.9 ABDOMINAL PAIN, UNSPECIFIED ABDOMINAL LOCATION: ICD-10-CM

## 2022-08-25 DIAGNOSIS — R93.89 ABNORMAL CT SCAN: ICD-10-CM

## 2022-08-25 LAB
LAB AP GYN PRIMARY INTERPRETATION: ABNORMAL
Lab: ABNORMAL
PATH INTERP SPEC-IMP: ABNORMAL

## 2022-08-25 PROCEDURE — 99243 OFF/OP CNSLTJ NEW/EST LOW 30: CPT | Performed by: SURGERY

## 2022-08-25 NOTE — TELEPHONE ENCOUNTER
Joni Ramirez called and seen her pap and HPV results and is going for the U/S tomorrow morning Pt  Has appendix surgery scheduled for Wends  Joni Ramirez wants to know if a cosurgery can be done if needed

## 2022-08-26 ENCOUNTER — HOSPITAL ENCOUNTER (OUTPATIENT)
Dept: RADIOLOGY | Age: 47
Discharge: HOME | End: 2022-08-26
Payer: COMMERCIAL

## 2022-08-26 DIAGNOSIS — R10.2 PELVIC PAIN: ICD-10-CM

## 2022-08-26 PROCEDURE — 76856 US EXAM PELVIC COMPLETE: CPT

## 2022-08-26 PROCEDURE — 76830 TRANSVAGINAL US NON-OB: CPT

## 2022-08-30 ENCOUNTER — ANESTHESIA EVENT (OUTPATIENT)
Dept: PERIOP | Facility: HOSPITAL | Age: 47
End: 2022-08-30
Payer: COMMERCIAL

## 2022-08-30 NOTE — ANESTHESIA PREPROCEDURE EVALUATION
Procedure:  LAPAROSCOPY DIAGNOSTIC (N/A Abdomen)  APPENDECTOMY LAPAROSCOPIC (N/A Abdomen)    Relevant Problems   CARDIO   (+) Hyperlipidemia      GI/HEPATIC   (+) Gastroesophageal reflux disease without esophagitis      NEURO/PSYCH   (+) Anxiety   (+) Depression, recurrent (HCC)   (+) History of syndactyly   (+) Recurrent major depressive disorder (HCC)      Other   (+) Mediastinal lymphadenopathy     PONV (postoperative nausea and vomiting)  Psychiatric disorder          Physical Exam    Airway    Mallampati score: II  TM Distance: >3 FB  Neck ROM: full     Dental       Cardiovascular      Pulmonary      Other Findings        Anesthesia Plan  ASA Score- 2     Anesthesia Type- general with ASA Monitors  Additional Monitors:   Airway Plan: ETT  Plan Factors-    Induction- intravenous  Postoperative Plan-     Informed Consent- Anesthetic plan and risks discussed with patient  I personally reviewed this patient with the CRNA  Discussed and agreed on the Anesthesia Plan with the CRNA  Madie Thompson

## 2022-08-30 NOTE — PRE-PROCEDURE INSTRUCTIONS
Pre-Surgery Instructions:   Medication Instructions    CALCIUM CITRATE PO Hold day of surgery   escitalopram (LEXAPRO) 20 mg tablet Take day of surgery   FIBER ADULT GUMMIES PO Hold day of surgery   LORazepam (ATIVAN) 0 5 mg tablet Take night before surgery    multivitamin (THERAGRAN) TABS Hold day of surgery   Probiotic Product (PRO-BIOTIC BLEND PO) Hold day of surgery   simethicone (MYLICON) 027 MG chewable tablet Hold day of surgery   valACYclovir (VALTREX) 500 mg tablet Uses PRN- OK to take day of surgery        NPO after midnight, meds in am with sips of water  Understands when to expect preop phone call  Remove all jewelry  Advised of visitation policy  Before your operation, you play an important role in decreasing your risk for infection by washing with special antiseptic soap  This is an effective way to reduce bacteria on the skin which may help to prevent infections at the surgical site  Please read the following directions in advance  1  In the week before your operation purchase a 4 ounce bottle of antiseptic soap containing chlorhexidine gluconate 4%  Some brand names include: Aplicare, Endure, and Hibiclens  The cost is usually less than $5 00  · For your convenience, the [address] [place] [address] carries the soap  · It may also be available at your doctor's office or pre-admission testing center, and at most retail pharmacies  · If you are allergic or sensitive to soaps containing chlorhexidine gluconate (CHG), please let your doctor know so another antiseptic soap can be suggested  · CHG antiseptic soap is for external use only  2      The day before your operation follow these directions carefully to get ready  · Place clean lines (sheets) on your bed; you should sleep on clean sheets after your evening shower    · Get clean towels and washcloths ready - you need enough for 2 showers  · Set aside clean underwear, pajamas, and clothing to wear after the shower  Reminders:  · DO NOT use any other soap or body rinse on your skin during or after the antiseptic showers  · DO NOT use lotion , powder, deodorant, or perfume/aftershave of any kind on your skin after your antiseptic shower  · DO NOT shave any body parts in the 24 hours/the day before your operation  · DO NOT get the antiseptic soap in your eyes, ears, nose, mouth, or vaginal area  3      You will need to shower the night before AND the morning of your Surgery  Shower 1:  · The evening before your operation, take the fist shower  · First, shampoo your hair with regular shampoo and rinse it completely before you use the anitseptic soap  After washing and rinsing your hair, rinse your body  · Next, use a clean wash cloth to apply the antiseptic soap and wash your body from the neck down to your toes using 1/2 bottle of the antiseptic soap  You will use the other 1/2 bottle for the second shower  · Clean the area where your incision will be; later this area well for about 2 minutes  · If you ar having head or neck surgery, wash areas with the antiseptic soap  · Rinse yourself completely with running water  · Use a clean towel to dry off  · Wear clean underwear and clothing/pajamas  Shower 2:  · The Morning of your operation, take the second shower following the same steps as Shower 1 using the second 1/2 of the bottle of antiseptic soap  · Use clean cloths and towels to was and dry yourself off  · Wear clean underwear and clothing

## 2022-08-31 ENCOUNTER — ANESTHESIA (OUTPATIENT)
Dept: PERIOP | Facility: HOSPITAL | Age: 47
End: 2022-08-31
Payer: COMMERCIAL

## 2022-08-31 ENCOUNTER — HOSPITAL ENCOUNTER (OUTPATIENT)
Facility: HOSPITAL | Age: 47
Setting detail: OUTPATIENT SURGERY
Discharge: HOME | End: 2022-08-31
Attending: SURGERY | Admitting: SURGERY
Payer: COMMERCIAL

## 2022-08-31 VITALS
HEART RATE: 48 BPM | WEIGHT: 203.6 LBS | DIASTOLIC BLOOD PRESSURE: 68 MMHG | RESPIRATION RATE: 15 BRPM | TEMPERATURE: 98 F | OXYGEN SATURATION: 95 % | SYSTOLIC BLOOD PRESSURE: 103 MMHG | BODY MASS INDEX: 36.07 KG/M2

## 2022-08-31 DIAGNOSIS — Z90.49 S/P APPENDECTOMY: Primary | ICD-10-CM

## 2022-08-31 DIAGNOSIS — R93.89 ABNORMAL CT SCAN: ICD-10-CM

## 2022-08-31 DIAGNOSIS — K38.8 MUCOCELE OF APPENDIX: ICD-10-CM

## 2022-08-31 LAB
EXT PREGNANCY TEST URINE: NEGATIVE
EXT. CONTROL: NORMAL

## 2022-08-31 PROCEDURE — 44970 LAPAROSCOPY APPENDECTOMY: CPT | Performed by: PHYSICIAN ASSISTANT

## 2022-08-31 PROCEDURE — 81025 URINE PREGNANCY TEST: CPT | Performed by: SURGERY

## 2022-08-31 PROCEDURE — 88304 TISSUE EXAM BY PATHOLOGIST: CPT | Performed by: SPECIALIST

## 2022-08-31 PROCEDURE — 44970 LAPAROSCOPY APPENDECTOMY: CPT | Performed by: SURGERY

## 2022-08-31 RX ORDER — SODIUM CHLORIDE, SODIUM LACTATE, POTASSIUM CHLORIDE, CALCIUM CHLORIDE 600; 310; 30; 20 MG/100ML; MG/100ML; MG/100ML; MG/100ML
125 INJECTION, SOLUTION INTRAVENOUS CONTINUOUS
Status: DISCONTINUED | OUTPATIENT
Start: 2022-08-31 | End: 2022-08-31 | Stop reason: HOSPADM

## 2022-08-31 RX ORDER — LIDOCAINE HYDROCHLORIDE 20 MG/ML
INJECTION, SOLUTION EPIDURAL; INFILTRATION; INTRACAUDAL; PERINEURAL AS NEEDED
Status: DISCONTINUED | OUTPATIENT
Start: 2022-08-31 | End: 2022-08-31

## 2022-08-31 RX ORDER — ONDANSETRON 2 MG/ML
4 INJECTION INTRAMUSCULAR; INTRAVENOUS EVERY 6 HOURS PRN
Status: DISCONTINUED | OUTPATIENT
Start: 2022-08-31 | End: 2022-08-31 | Stop reason: HOSPADM

## 2022-08-31 RX ORDER — FENTANYL CITRATE/PF 50 MCG/ML
50 SYRINGE (ML) INJECTION
Status: DISCONTINUED | OUTPATIENT
Start: 2022-08-31 | End: 2022-08-31 | Stop reason: HOSPADM

## 2022-08-31 RX ORDER — PROPOFOL 10 MG/ML
INJECTION, EMULSION INTRAVENOUS CONTINUOUS PRN
Status: DISCONTINUED | OUTPATIENT
Start: 2022-08-31 | End: 2022-08-31

## 2022-08-31 RX ORDER — ONDANSETRON 2 MG/ML
INJECTION INTRAMUSCULAR; INTRAVENOUS AS NEEDED
Status: DISCONTINUED | OUTPATIENT
Start: 2022-08-31 | End: 2022-08-31

## 2022-08-31 RX ORDER — ROCURONIUM BROMIDE 10 MG/ML
INJECTION, SOLUTION INTRAVENOUS AS NEEDED
Status: DISCONTINUED | OUTPATIENT
Start: 2022-08-31 | End: 2022-08-31

## 2022-08-31 RX ORDER — KETOROLAC TROMETHAMINE 30 MG/ML
INJECTION, SOLUTION INTRAMUSCULAR; INTRAVENOUS AS NEEDED
Status: DISCONTINUED | OUTPATIENT
Start: 2022-08-31 | End: 2022-08-31

## 2022-08-31 RX ORDER — GLYCOPYRROLATE 0.2 MG/ML
INJECTION INTRAMUSCULAR; INTRAVENOUS AS NEEDED
Status: DISCONTINUED | OUTPATIENT
Start: 2022-08-31 | End: 2022-08-31

## 2022-08-31 RX ORDER — FENTANYL CITRATE 50 UG/ML
INJECTION, SOLUTION INTRAMUSCULAR; INTRAVENOUS AS NEEDED
Status: DISCONTINUED | OUTPATIENT
Start: 2022-08-31 | End: 2022-08-31

## 2022-08-31 RX ORDER — LEVOFLOXACIN 5 MG/ML
500 INJECTION, SOLUTION INTRAVENOUS ONCE
Status: CANCELLED | OUTPATIENT
Start: 2022-08-31

## 2022-08-31 RX ORDER — METOCLOPRAMIDE HYDROCHLORIDE 5 MG/ML
10 INJECTION INTRAMUSCULAR; INTRAVENOUS ONCE AS NEEDED
Status: DISCONTINUED | OUTPATIENT
Start: 2022-08-31 | End: 2022-08-31 | Stop reason: HOSPADM

## 2022-08-31 RX ORDER — METRONIDAZOLE 500 MG/100ML
500 INJECTION, SOLUTION INTRAVENOUS ONCE
Status: COMPLETED | OUTPATIENT
Start: 2022-08-31 | End: 2022-08-31

## 2022-08-31 RX ORDER — METRONIDAZOLE 500 MG/100ML
500 INJECTION, SOLUTION INTRAVENOUS ONCE
Status: CANCELLED | OUTPATIENT
Start: 2022-08-31

## 2022-08-31 RX ORDER — OXYCODONE HYDROCHLORIDE 5 MG/1
5 TABLET ORAL EVERY 6 HOURS PRN
Qty: 10 TABLET | Refills: 0 | Status: SHIPPED | OUTPATIENT
Start: 2022-08-31 | End: 2022-09-03

## 2022-08-31 RX ORDER — PROPOFOL 10 MG/ML
INJECTION, EMULSION INTRAVENOUS AS NEEDED
Status: DISCONTINUED | OUTPATIENT
Start: 2022-08-31 | End: 2022-08-31

## 2022-08-31 RX ORDER — SODIUM CHLORIDE, SODIUM LACTATE, POTASSIUM CHLORIDE, CALCIUM CHLORIDE 600; 310; 30; 20 MG/100ML; MG/100ML; MG/100ML; MG/100ML
125 INJECTION, SOLUTION INTRAVENOUS CONTINUOUS
Status: CANCELLED | OUTPATIENT
Start: 2022-08-31

## 2022-08-31 RX ORDER — ACETAMINOPHEN 325 MG/1
650 TABLET ORAL EVERY 6 HOURS PRN
Qty: 60 TABLET | Refills: 0
Start: 2022-08-31

## 2022-08-31 RX ORDER — ONDANSETRON 2 MG/ML
4 INJECTION INTRAMUSCULAR; INTRAVENOUS ONCE AS NEEDED
Status: DISCONTINUED | OUTPATIENT
Start: 2022-08-31 | End: 2022-08-31 | Stop reason: HOSPADM

## 2022-08-31 RX ORDER — HYDROMORPHONE HCL/PF 1 MG/ML
SYRINGE (ML) INJECTION AS NEEDED
Status: DISCONTINUED | OUTPATIENT
Start: 2022-08-31 | End: 2022-08-31

## 2022-08-31 RX ORDER — ACETAMINOPHEN 325 MG/1
650 TABLET ORAL EVERY 6 HOURS PRN
Status: DISCONTINUED | OUTPATIENT
Start: 2022-08-31 | End: 2022-08-31 | Stop reason: HOSPADM

## 2022-08-31 RX ORDER — MIDAZOLAM HYDROCHLORIDE 2 MG/2ML
INJECTION, SOLUTION INTRAMUSCULAR; INTRAVENOUS AS NEEDED
Status: DISCONTINUED | OUTPATIENT
Start: 2022-08-31 | End: 2022-08-31

## 2022-08-31 RX ORDER — DEXAMETHASONE SODIUM PHOSPHATE 10 MG/ML
INJECTION, SOLUTION INTRAMUSCULAR; INTRAVENOUS AS NEEDED
Status: DISCONTINUED | OUTPATIENT
Start: 2022-08-31 | End: 2022-08-31

## 2022-08-31 RX ORDER — OXYCODONE HYDROCHLORIDE 5 MG/1
5 TABLET ORAL EVERY 4 HOURS PRN
Status: DISCONTINUED | OUTPATIENT
Start: 2022-08-31 | End: 2022-08-31 | Stop reason: HOSPADM

## 2022-08-31 RX ORDER — OXYCODONE HYDROCHLORIDE 5 MG/1
10 TABLET ORAL EVERY 4 HOURS PRN
Status: DISCONTINUED | OUTPATIENT
Start: 2022-08-31 | End: 2022-08-31 | Stop reason: HOSPADM

## 2022-08-31 RX ORDER — NEOSTIGMINE METHYLSULFATE 1 MG/ML
INJECTION INTRAVENOUS AS NEEDED
Status: DISCONTINUED | OUTPATIENT
Start: 2022-08-31 | End: 2022-08-31

## 2022-08-31 RX ORDER — SODIUM CHLORIDE, SODIUM LACTATE, POTASSIUM CHLORIDE, CALCIUM CHLORIDE 600; 310; 30; 20 MG/100ML; MG/100ML; MG/100ML; MG/100ML
INJECTION, SOLUTION INTRAVENOUS CONTINUOUS PRN
Status: DISCONTINUED | OUTPATIENT
Start: 2022-08-31 | End: 2022-08-31

## 2022-08-31 RX ORDER — LEVOFLOXACIN 5 MG/ML
500 INJECTION, SOLUTION INTRAVENOUS ONCE
Status: COMPLETED | OUTPATIENT
Start: 2022-08-31 | End: 2022-08-31

## 2022-08-31 RX ADMIN — HYDROMORPHONE HYDROCHLORIDE 0.5 MG: 1 INJECTION, SOLUTION INTRAMUSCULAR; INTRAVENOUS; SUBCUTANEOUS at 09:42

## 2022-08-31 RX ADMIN — PROPOFOL 200 MG: 10 INJECTION, EMULSION INTRAVENOUS at 09:06

## 2022-08-31 RX ADMIN — SODIUM CHLORIDE, SODIUM LACTATE, POTASSIUM CHLORIDE, AND CALCIUM CHLORIDE: .6; .31; .03; .02 INJECTION, SOLUTION INTRAVENOUS at 08:50

## 2022-08-31 RX ADMIN — KETOROLAC TROMETHAMINE 15 MG: 30 INJECTION, SOLUTION INTRAMUSCULAR; INTRAVENOUS at 09:53

## 2022-08-31 RX ADMIN — LIDOCAINE HYDROCHLORIDE 100 MG: 20 INJECTION, SOLUTION EPIDURAL; INFILTRATION; INTRACAUDAL; PERINEURAL at 09:06

## 2022-08-31 RX ADMIN — LEVOFLOXACIN: 5 INJECTION, SOLUTION INTRAVENOUS at 09:13

## 2022-08-31 RX ADMIN — DEXAMETHASONE SODIUM PHOSPHATE 10 MG: 10 INJECTION, SOLUTION INTRAMUSCULAR; INTRAVENOUS at 09:11

## 2022-08-31 RX ADMIN — SODIUM CHLORIDE, SODIUM LACTATE, POTASSIUM CHLORIDE, AND CALCIUM CHLORIDE: .6; .31; .03; .02 INJECTION, SOLUTION INTRAVENOUS at 10:11

## 2022-08-31 RX ADMIN — PROPOFOL 150 MCG/KG/MIN: 10 INJECTION, EMULSION INTRAVENOUS at 09:06

## 2022-08-31 RX ADMIN — OXYCODONE HYDROCHLORIDE 10 MG: 5 TABLET ORAL at 10:56

## 2022-08-31 RX ADMIN — SODIUM CHLORIDE, SODIUM LACTATE, POTASSIUM CHLORIDE, AND CALCIUM CHLORIDE: .6; .31; .03; .02 INJECTION, SOLUTION INTRAVENOUS at 09:00

## 2022-08-31 RX ADMIN — ONDANSETRON 4 MG: 2 INJECTION INTRAMUSCULAR; INTRAVENOUS at 09:11

## 2022-08-31 RX ADMIN — MIDAZOLAM 2 MG: 1 INJECTION INTRAMUSCULAR; INTRAVENOUS at 08:59

## 2022-08-31 RX ADMIN — ROCURONIUM BROMIDE 50 MG: 10 INJECTION, SOLUTION INTRAVENOUS at 09:06

## 2022-08-31 RX ADMIN — NEOSTIGMINE METHYLSULFATE 3 MG: 1 INJECTION INTRAVENOUS at 09:53

## 2022-08-31 RX ADMIN — GLYCOPYRROLATE 0.4 MCG: 0.2 INJECTION, SOLUTION INTRAMUSCULAR; INTRAVENOUS at 09:53

## 2022-08-31 RX ADMIN — FENTANYL CITRATE 50 MCG: 50 INJECTION, SOLUTION INTRAMUSCULAR; INTRAVENOUS at 09:40

## 2022-08-31 RX ADMIN — METRONIDAZOLE: 500 INJECTION, SOLUTION INTRAVENOUS at 09:00

## 2022-08-31 RX ADMIN — FENTANYL CITRATE 50 MCG: 50 INJECTION, SOLUTION INTRAMUSCULAR; INTRAVENOUS at 09:06

## 2022-08-31 NOTE — ANESTHESIA POSTPROCEDURE EVALUATION
Post-Op Assessment Note    CV Status:  Stable  Pain Score: 0    Pain management: adequate     Mental Status:  Arousable and sleepy   Hydration Status:  Stable   PONV Controlled:  Controlled   Airway Patency:  Patent      Post Op Vitals Reviewed: Yes      Staff: CRNA         No complications documented      BP   112/64   Temp 97 6   Pulse 77   Resp 14   SpO2 99

## 2022-08-31 NOTE — INTERIM OP NOTE
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]    Procedure(s) (LRB):  LAPAROSCOPY DIAGNOSTIC (N/A)  APPENDECTOMY LAPAROSCOPIC (N/A)  PARTIAL CECECTOMY, LAPAROSCOPIC (N/A)    Surgeon(s) and Role:     * Sumaya Mistry MD - Primary     Christina Horn PA-C - Assisting    Specimens:  ID Type Source Tests Collected by Time Destination   1 : Appendix and partial cecectomy Tissue Appendix TISSUE EXAM Sumaya Mistry MD 2022 [id number]        Estimated Blood Loss:   Minimal    Anesthesia Type:   General ETA    Findings:   Normal appearing appendix, retrocecal  No definitive mass  No SB abnormaility  Complications:   None    SIGNATURE: Merrick Horn PA-C   DATE: 2022   TIME: 10:03 AM

## 2022-08-31 NOTE — DISCHARGE INSTRUCTIONS
Juliocesar Sanders Instructions  Dr Andrea Meeks MD, [facility]  [phone]    1  General: You will feel pulling sensations around the wound or funny aches and pains around the incisions  This is normal  Even minor surgery is a change in your body and this is your body's way of reacting to it  If you have had abdominal surgery, it may help to support the incision with a small pillow or blanket for comfort when moving or coughing  2  Wound care:  Okay to shower  The glue will fall off over the next week or 2  Use ice for the first 5 days after surgery  Do not use for longer than 20 minutes at a time  Use ice 5 times per day  3  Water: You may shower over the wounds  Do not bathe or use a pool or hot tub until cleared by the physician  If you were discharged with a drain, make sure drain site is covered with plastic wrap before showering  4  Activity: You may go up and down stairs, walk as much as you are comfortable, but walk at least 3 times each day  If you have had abdominal surgery, do not lift anything heavier than 15 lbs for the 1st 2 weeks and 25 lbs for weeks 3 and 4      5  Diet: You may resume a regular diet  If you had a same-day surgery or overnight stay surgery, you may wish to eat lightly for a few days: soups, crackers, and sandwiches  You may resume a regular diet when ready  6  Medications: Resume all of your previous medications, unless told otherwise by the doctor  Avoid aspirin products for 2-3 days after the date of surgery  You may, at that time, begin to take them again  Use Tylenol and Ibuprofen for pain control  You may alternate these medications every 3 hours  For example: you may take Tylenol at noon, Ibuprofen at 3:00 p m , and Tylenol again at 6:00 p m , etc   You should use ice to assist with pain control as above  You do not need to take narcotic pain medication unless you are having significant pain  7  Driving:  You will need someone to drive you home on the day of surgery or discharge  Do not drive or make any important decisions while on narcotic pain medication or 24 hours and after anesthesia or sedation for surgery  Generally, you may drive when your off all narcotic pain medications and you are comfortable  8  Upset Stomach: You may take Maalox, Tums, or similar items for an upset stomach  If your narcotic pain medication causes an upset stomach, do not take it on an empty stomach  Try taking it with at least some crackers or toast      9  Constipation: Patients often experience constipation after surgery  You may take over-the-counter medication for this, such as Metamucil, Senokot, Dulcolax, milk of magnesia, etc  You may take a suppository unless you have had anorectal surgery such as a procedure on your hemorrhoids  If you experience significant nausea or vomiting after abdominal surgery, call the office before trying any of these medications  10  Call the office: If you are experiencing any of the following: fevers above 101 5°, significant nausea or vomiting, if the wound develops drainage and/or there is excessive redness around the wound, or if you have significant diarrhea or other worsening symptoms  11  Pain: You may be given a prescription for pain medication  This will be sent to your pharmacy prior to discharge  12  Sexual Activity: You may resume sexual activity when you feel ready and comfortable and your incision is sealed and healed without apparent infection risk  13  Urination: If you have not urinated in 6 hours, go directly to the ER for evaluation for urinary retention  14  Follow-up in 2 weeks

## 2022-08-31 NOTE — INTERVAL H&P NOTE
H&P reviewed  After examining the patient I find no changes in the patients condition since the H&P had been written      Vitals:    08/31/22 0809   BP: 117/67   Pulse: 60   Resp: 19   Temp: 97 8 °F (36 6 °C)   SpO2: 96%

## 2022-08-31 NOTE — OP NOTE
Appendectomy, Lap, Procedure Note    Name: Rama Barahona   : 1975  MRN: [id number]  Date: 2022    Indications: The patient presented with a history , symptoms, and signs of abdominal pain, abnormal appendix on c-scope, fluid filled appendix on CT, discussed operative vs conservative mgt and patient wishes to proceed with appendectomy     Pre-operative Diagnosis: Appendical mass, abdominal pain    Post-operative Diagnosis: Same    Procedure: Laparoscopic Appendectomy and partial cecotomy, dx laparoscopy    Surgeon: Sohail Schroeder MD    Assistants: Kirti Horn PA-C    Anesthesia: General endotracheal anesthesia         Note: No qualified resident available  Physician Assistant medically necessary for surgical safety of case and for suturing, retraction, and hemostasis  Procedure Details: The patient was seen again in the Holding Room  The risks, benefits, complications, treatment options, and expected outcomes were discussed with the patient and/or family  The possibilities of reaction to medication, pulmonary aspiration, perforation of viscus, bleeding, recurrent infection, finding a normal appendix, the need for additional procedures, failure to diagnose a condition, and creating a complication requiring transfusion or operation were discussed  There was concurrence with the proposed plan and informed consent was obtained  The site of surgery was properly noted/marked  The patient was taken to Operating Room, identified as Rama Barahona and the procedure verified as Appendectomy  A Time Out was held after the prep and draping,  and the above information confirmed  The patient was placed in the supine position and general anesthesia was induced, along with placement of orogastric tube, Venodyne boots, and a Bentley catheter  The abdomen was prepped and draped in a sterile fashion  An infraumbilical incision was made and an open technique was used to enter the abdomen    A Nancy port was placed and a pneumoperitoneum created  Additional ports were placed under direct vision in the routine positions  A careful evaluation of the entire abdomen was carried out  The patient was placed in Trendelenburg and left lateral decubitus position  The small intestines were retracted in the cephalad and left lateral direction away from the pelvis and right lower quadrant  There was visualized an  inflamed appendix that was extending into the pelvis  There was no evidence of perforation  The appendix was carefully dissected  A window was made in the mesoappendix at the base of the appendix using a [place]  A endo-FINN stapler with a blue load was fire across the cecum  A white load was then fired across the mesoappendix  There was no evidence of bleeding, leakage, or complication after division of the appendix and mesoappendix  Irrigation was also performed and irrigate suctioned from the abdomen as well  The bowel was now run given her history of chronic abdominal pain and RYGB, the bowel was run from TI to [address] [place] anastomosis with no abnormalities and no evidence of internal hernias  The Nancy port site fascia was closed using a 0-Vicryl figure of eight and well as tying down the stay sutures  All skin incisions were closed using MonocryI suture in a subcuticular fashion  The instrument, sponge, and needle counts were correct at the conclusion of the case  Findings: The appendix was found to be inflamed  There were not signs of necrosis  There was not perforation  There was not abscess formation  Estimated Blood Loss:  Minimal           Drains: No                  Specimens: Appendix  Order Name Source Comment Collection Info Order Time   TISSUE EXAM Appendix  Collected By: Chester Concepcion MD 8/31/2022  9:51 AM     [facility]   Immediate                   Complications:  None; patient tolerated the procedure well             Disposition: PACU Condition: Stable    Attending Attestation: I was present for the entire procedure and qualified resident physician was not available      Signature:   Michael Simon MD  Date: 8/31/2022 Time: 2:03 PM

## 2022-08-31 NOTE — H&P (VIEW-ONLY)
Assessment/Plan:   Alia Negron is a 48 y  o female who is here for Other (Abnormal Colonoscopy  New patient referred by [facility] for evaluation of this  Colonoscopy done 6/20 that noted abnormality of appendix  Patient states she has been having pain in right lower quadrant area since procedure  Also feels bloated )    Plan: Abdominal pain/Appendiceal mass/dilation - discussed operative vs conservative mgt, surgical approaches, risks and benefits and patient understands that her pain not resolve after surgery  Pt understands and wishes to proceed with laparoscopic appendectomy  I will also perform a diagnostic laparoscopy as she is a bypass patient and some of her pain could be related to an internal hernia although I dont see any radiologic evidence on her CT    Preoperative Clearance: None    HPI:  Alia Negron is a 48 y  o female who was referred for evaluation of Other (Abnormal Colonoscopy  New patient referred by [facility] for evaluation of this  Colonoscopy done 6/20 that noted abnormality of appendix  Patient states she has been having pain in right lower quadrant area since procedure  Also feels bloated )    Currently abd pain  ROS:  General ROS: negative  negative for - chills, fatigue, fever or night sweats, weight loss  Respiratory ROS: no cough, shortness of breath, or wheezing  Cardiovascular ROS: no chest pain or dyspnea on exertion  Genito-Urinary ROS: no dysuria, trouble voiding, or hematuria  Musculoskeletal ROS: negative for - gait disturbance, joint pain or muscle pain  Neurological ROS: no TIA or stroke symptoms  abdominal pain    [unfilled]  Duricef [cefadroxil], Penicillins, Dayquil [pseudoephedrine-[id number]], and Medical tape  No current facility-administered medications for this visit  No current outpatient medications on file      Facility-Administered Medications Ordered in Other Visits:     lactated ringers infusion, , Intravenous, Continuous PRN, Graham Ayers CRNA, New Bag at 22 0701    midazolam (VERSED) injection, , Intravenous, PRN, Graham Ayers CRNA, 2 mg at 22 [id number]  Past Medical History:   Diagnosis Date    Anxiety     Depression     Exercise involving walking     30 min daily and light weights    Fatty liver     GERD (gastroesophageal reflux disease)     Heart murmur     "from birth"    Hiatal hernia     History of COVID-19 2020    recovered at home, moderate symptoms    History of pneumonia     History of tobacco abuse 2021    History of UTI     "in  and polynephritis"    History of      x2 after 1st C section    Motion sickness     PONV (postoperative nausea and vomiting)     Psychiatric disorder     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    Wears glasses     reading     Past Surgical History:   Procedure Laterality Date    BREAST BIOPSY Right 2004    titanium clip implanted     SECTION      x1    DENTAL IMPLANT      2 lower right    EGD      ENDOMETRIAL ABLATION  2007    GANGLION CYST EXCISION      HAND SURGERY Left     x7 and skin grafts    OVARIAN CYST DRAINAGE      DE LAP GASTRIC BYPASS/JONATHAN-EN-Y N/A 2022    Procedure: LAPAROSCOPIC JONATHAN-EN-Y GASTRIC BYPASS & INTRAOPERATIVE EGD;  Surgeon: Israel Garcia MD;  Location: [facility] OR;  Service: [address] [place] [address] [address])      WRIST SURGERY       Family History   Problem Relation Age of Onset    Cancer Mother     Lung cancer Mother 61        small cell lung    Cancer Father     Lung cancer Father 47        adenocarcinoma of lung    No Known Problems Daughter     No Known Problems Maternal Grandmother     Prostate cancer Maternal Grandfather 80    No Known Problems Paternal Grandmother     No Known Problems Paternal Grandfather     No Known Problems Son     No Known Problems Daughter       reports that she quit smoking about 15 years ago  Her smoking use included cigarettes  She started smoking about 30 years ago  She has a 10 00 pack-year smoking history  She quit smokeless tobacco use about 15 years ago  She reports previous alcohol use  She reports that she does not use drugs  Labs:   Lab Results   Component Value Date    WBC 7 5 06/15/2022    WBC 15 14 (H) 01/25/2022    WBC 9 16 10/05/2021    WBC 11 34 (H) 07/18/2021    HGB 14 7 06/15/2022    HGB 13 8 01/25/2022    HGB 14 3 10/05/2021    HGB 15 6 (H) 07/18/2021     06/15/2022     01/25/2022     10/05/2021     07/18/2021     Lab Results   Component Value Date    ALT 15 06/15/2022    ALT 37 10/05/2021    ALT 31 07/18/2021    AST 18 06/15/2022    AST 20 10/05/2021    AST 22 07/18/2021     This SmartLink has not been configured with any valid records  PHYSICAL EXAM  General Appearance:    Alert, cooperative, no distress,    Head:    Normocephalic without obvious abnormality   Eyes:    PERRL, conjunctiva/corneas clear, EOM's intact        Neck:   Supple, no adenopathy, no JVD   Back:     Symmetric, no spinal or CVA tenderness   Lungs:     Clear to auscultation bilaterally, no wheezing or rhonchi   Heart:    Regular rate and rhythm, S1 and S2 normal, no murmur   Abdomen:     Soft +BS ND NT   Extremities:   Extremities normal  No clubbing, cyanosis or edema   Psych:   Normal Affect, AOx3  Neurologic:  Skin:   CNII-XII intact  Strength symmetric, speech intact    Warm, dry, intact, no visible rashes or lesions         Physical Exam              Some portions of this record may have been generated with voice recognition software  There may be translation, syntax,  or grammatical errors  Occasional wrong word or "sound-a-like" substitutions may have occurred due to the inherent limitations of the voice recognition software  Read the chart carefully and recognize, using context, where substitutions may have occurred   If you have any questions, please contact the dictating provider for clarification or correction, as needed  This encounter has been coded by a non-certified coder

## 2022-08-31 NOTE — PROGRESS NOTES
Assessment/Plan:   Alden Pablo is a 48 y  o female who is here for Other (Abnormal Colonoscopy  New patient referred by [facility] for evaluation of this  Colonoscopy done 6/20 that noted abnormality of appendix  Patient states she has been having pain in right lower quadrant area since procedure  Also feels bloated )    Plan: Abdominal pain/Appendiceal mass/dilation - discussed operative vs conservative mgt, surgical approaches, risks and benefits and patient understands that her pain not resolve after surgery  Pt understands and wishes to proceed with laparoscopic appendectomy  I will also perform a diagnostic laparoscopy as she is a bypass patient and some of her pain could be related to an internal hernia although I dont see any radiologic evidence on her CT    Preoperative Clearance: None    HPI:  Alden Pablo is a 48 y  o female who was referred for evaluation of Other (Abnormal Colonoscopy  New patient referred by [facility] for evaluation of this  Colonoscopy done 6/20 that noted abnormality of appendix  Patient states she has been having pain in right lower quadrant area since procedure  Also feels bloated )    Currently abd pain  ROS:  General ROS: negative  negative for - chills, fatigue, fever or night sweats, weight loss  Respiratory ROS: no cough, shortness of breath, or wheezing  Cardiovascular ROS: no chest pain or dyspnea on exertion  Genito-Urinary ROS: no dysuria, trouble voiding, or hematuria  Musculoskeletal ROS: negative for - gait disturbance, joint pain or muscle pain  Neurological ROS: no TIA or stroke symptoms  abdominal pain    [unfilled]  Duricef [cefadroxil], Penicillins, Dayquil [pseudoephedrine-[id number]], and Medical tape  No current facility-administered medications for this visit  No current outpatient medications on file      Facility-Administered Medications Ordered in Other Visits:     lactated ringers infusion, , Intravenous, Continuous PRN, Rupinder Davidia CRNA, New Bag at 22 [phone]    midazolam (VERSED) injection, , Intravenous, PRN, Rupinder Cordia, CRNA, 2 mg at 22 8231  Past Medical History:   Diagnosis Date    Anxiety     Depression     Exercise involving walking     30 min daily and light weights    Fatty liver     GERD (gastroesophageal reflux disease)     Heart murmur     "from birth"    Hiatal hernia     History of COVID-19 2020    recovered at home, moderate symptoms    History of pneumonia     History of tobacco abuse 2021    History of UTI     "in  and polynephritis"    History of      x2 after 1st C section    Motion sickness     PONV (postoperative nausea and vomiting)     Psychiatric disorder     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    Wears glasses     reading     Past Surgical History:   Procedure Laterality Date    BREAST BIOPSY Right 2004    titanium clip implanted     SECTION      x1    DENTAL IMPLANT      2 lower right    EGD      ENDOMETRIAL ABLATION  2007    GANGLION CYST EXCISION      HAND SURGERY Left     x7 and skin grafts    OVARIAN CYST DRAINAGE      ME LAP GASTRIC BYPASS/JONATHAN-EN-Y N/A 2022    Procedure: LAPAROSCOPIC JONATHAN-EN-Y GASTRIC BYPASS & INTRAOPERATIVE EGD;  Surgeon: Carlitos Mireles MD;  Location: [facility] OR;  Service: [address] [place] [address] [address])      WRIST SURGERY       Family History   Problem Relation Age of Onset    Cancer Mother     Lung cancer Mother 61        small cell lung    Cancer Father     Lung cancer Father 47        adenocarcinoma of lung    No Known Problems Daughter     No Known Problems Maternal Grandmother     Prostate cancer Maternal Grandfather 80    No Known Problems Paternal Grandmother     No Known Problems Paternal Grandfather     No Known Problems Son     No Known Problems Daughter       reports that she quit smoking about 15 years ago  Her smoking use included cigarettes  She started smoking about 30 years ago  She has a 10 00 pack-year smoking history  She quit smokeless tobacco use about 15 years ago  She reports previous alcohol use  She reports that she does not use drugs  Labs:   Lab Results   Component Value Date    WBC 7 5 06/15/2022    WBC 15 14 (H) 01/25/2022    WBC 9 16 10/05/2021    WBC 11 34 (H) 07/18/2021    HGB 14 7 06/15/2022    HGB 13 8 01/25/2022    HGB 14 3 10/05/2021    HGB 15 6 (H) 07/18/2021     06/15/2022     01/25/2022     10/05/2021     07/18/2021     Lab Results   Component Value Date    ALT 15 06/15/2022    ALT 37 10/05/2021    ALT 31 07/18/2021    AST 18 06/15/2022    AST 20 10/05/2021    AST 22 07/18/2021     This SmartLink has not been configured with any valid records  PHYSICAL EXAM  General Appearance:    Alert, cooperative, no distress,    Head:    Normocephalic without obvious abnormality   Eyes:    PERRL, conjunctiva/corneas clear, EOM's intact        Neck:   Supple, no adenopathy, no JVD   Back:     Symmetric, no spinal or CVA tenderness   Lungs:     Clear to auscultation bilaterally, no wheezing or rhonchi   Heart:    Regular rate and rhythm, S1 and S2 normal, no murmur   Abdomen:     Soft +BS ND NT   Extremities:   Extremities normal  No clubbing, cyanosis or edema   Psych:   Normal Affect, AOx3  Neurologic:  Skin:   CNII-XII intact  Strength symmetric, speech intact    Warm, dry, intact, no visible rashes or lesions         Physical Exam              Some portions of this record may have been generated with voice recognition software  There may be translation, syntax,  or grammatical errors  Occasional wrong word or "sound-a-like" substitutions may have occurred due to the inherent limitations of the voice recognition software  Read the chart carefully and recognize, using context, where substitutions may have occurred   If you have any questions, please contact the dictating provider for clarification or correction, as needed  This encounter has been coded by a non-certified coder

## 2022-09-08 PROCEDURE — 88304 TISSUE EXAM BY PATHOLOGIST: CPT | Performed by: SPECIALIST

## 2022-09-14 ENCOUNTER — HOSPITAL ENCOUNTER (OUTPATIENT)
Dept: RADIOLOGY | Facility: IMAGING CENTER | Age: 47
Discharge: HOME | End: 2022-09-14
Payer: COMMERCIAL

## 2022-09-14 VITALS — HEIGHT: 63 IN | WEIGHT: 199 LBS | BODY MASS INDEX: 35.26 KG/M2

## 2022-09-14 DIAGNOSIS — Z12.31 SCREENING MAMMOGRAM FOR BREAST CANCER: ICD-10-CM

## 2022-09-14 PROCEDURE — 77063 BREAST TOMOSYNTHESIS BI: CPT

## 2022-09-14 PROCEDURE — 77067 SCR MAMMO BI INCL CAD: CPT

## 2022-09-15 ENCOUNTER — OFFICE VISIT (OUTPATIENT)
Dept: SURGERY | Facility: HOSPITAL | Age: 47
End: 2022-09-15

## 2022-09-15 ENCOUNTER — PROCEDURE VISIT (OUTPATIENT)
Dept: GYNECOLOGY | Facility: CLINIC | Age: 47
End: 2022-09-15
Payer: COMMERCIAL

## 2022-09-15 VITALS
DIASTOLIC BLOOD PRESSURE: 70 MMHG | WEIGHT: 202 LBS | HEIGHT: 63 IN | SYSTOLIC BLOOD PRESSURE: 118 MMHG | HEART RATE: 90 BPM | BODY MASS INDEX: 35.79 KG/M2

## 2022-09-15 VITALS — WEIGHT: 202.6 LBS | TEMPERATURE: 98.4 F | RESPIRATION RATE: 16 BRPM | BODY MASS INDEX: 35.9 KG/M2 | HEIGHT: 63 IN

## 2022-09-15 DIAGNOSIS — R87.613 HGSIL (HIGH GRADE SQUAMOUS INTRAEPITHELIAL LESION) ON PAP SMEAR OF CERVIX: Primary | ICD-10-CM

## 2022-09-15 DIAGNOSIS — Z09 POSTOP CHECK: Primary | ICD-10-CM

## 2022-09-15 PROCEDURE — 88305 TISSUE EXAM BY PATHOLOGIST: CPT | Performed by: PATHOLOGY

## 2022-09-15 PROCEDURE — 99024 POSTOP FOLLOW-UP VISIT: CPT | Performed by: SURGERY

## 2022-09-15 PROCEDURE — 57455 BIOPSY OF CERVIX W/SCOPE: CPT | Performed by: OBSTETRICS & GYNECOLOGY

## 2022-09-15 NOTE — PROGRESS NOTES
Colposcopy     Date/Time 9/15/2022 2:52 PM     Universal Protocol   Consent: Verbal consent obtained  Written consent obtained  Risks and benefits: risks, benefits and alternatives were discussed  Consent given by: patient  Patient understanding: patient states understanding of the procedure being performed  Patient consent: the patient's understanding of the procedure matches consent given  Patient identity confirmed: verbally with patient       Performed by  Piper Smith DO     Authorized by Piper Smith DO        Pre-procedure details     Prepped with: acetic acid       Indication    HSIL     Procedure Details   Procedure: Colposcopy w/ biopsy of cervix      Under satisfactory analgesia the patient was prepped and draped in the dorsal lithotomy position: yes      Lucernemines speculum was placed in the vagina: yes      Under colposcopic examination the transition zone was seen in entirety: yes      Cervical biopsy performed with a cervical biopsy punch: yes      Monsel's solution was applied: yes      Biopsy(s): yes      Location:  4;00    Specimen to pathology: yes       Post-procedure      Findings: Mosaicism and Punctation      Impression: High grade cervical dysplasia      Patient tolerance of procedure:   Tolerated well, no immediate complications

## 2022-09-16 ENCOUNTER — PREP FOR PROCEDURE (OUTPATIENT)
Dept: GYNECOLOGY | Facility: CLINIC | Age: 47
End: 2022-09-16

## 2022-09-16 ENCOUNTER — ANESTHESIA EVENT (OUTPATIENT)
Dept: PERIOP | Facility: HOSPITAL | Age: 47
End: 2022-09-16
Payer: COMMERCIAL

## 2022-09-16 PROCEDURE — NC001 PR NO CHARGE: Performed by: OBSTETRICS & GYNECOLOGY

## 2022-09-16 NOTE — H&P
HSIL      49-year-old with high-grade MARVIN now admitted for a LEEP cone biopsy  Procedure and risks discussed  Allergies; Duricef, penicillins, medical tape,  Medications:  Ativan    Past medical history; depression, hyperlipidemia     past surgical history endometrial ablation/appendectomy/gastric bypass/ section/and surgery    Physical exam vital stable    Lungs clear to auscultate  Cardiovascular: Regular rate and rhythm  Abdomen: Benign  Pelvic: Uterus anteverted normal size and contour freely mobile nontender  No palpable adnexal masses or tenderness  Cervix friable

## 2022-09-16 NOTE — PRE-PROCEDURE INSTRUCTIONS
Pre-Surgery Instructions:   Medication Instructions    acetaminophen (TYLENOL) 325 mg tablet Uses PRN- OK to take day of surgery    CALCIUM CITRATE PO Hold day of surgery   escitalopram (LEXAPRO) 20 mg tablet Take day of surgery   FIBER ADULT GUMMIES PO Hold day of surgery   LORazepam (ATIVAN) 0 5 mg tablet Take night before surgery    multivitamin (THERAGRAN) TABS Hold day of surgery   simethicone (MYLICON) 064 MG chewable tablet Uses PRN- OK to take day of surgery    valACYclovir (VALTREX) 500 mg tablet Uses PRN- OK to take day of surgery    You will receive a phone call from hospital for arrival time  Please call surgeons office if any changes in your condition  Wear easy on/off clothing; consider type of surgery;  Valuables, jewelry, piercing's please keep at home  **COVID-19  education/surgical guidelines  Updated covid    Visitation policy  Please: No contact lenses or eye make up, artificial eyelashes    Please secure transportation     Follow pre surgery showering or cleaning instructions as  Reviewed by nurse or surgeons office      Questions answered and concerns addressed

## 2022-09-19 ENCOUNTER — HOSPITAL ENCOUNTER (OUTPATIENT)
Facility: HOSPITAL | Age: 47
Setting detail: OUTPATIENT SURGERY
Discharge: HOME | End: 2022-09-19
Attending: OBSTETRICS & GYNECOLOGY | Admitting: OBSTETRICS & GYNECOLOGY
Payer: COMMERCIAL

## 2022-09-19 ENCOUNTER — ANESTHESIA (OUTPATIENT)
Dept: PERIOP | Facility: HOSPITAL | Age: 47
End: 2022-09-19
Payer: COMMERCIAL

## 2022-09-19 VITALS
OXYGEN SATURATION: 96 % | BODY MASS INDEX: 35.86 KG/M2 | SYSTOLIC BLOOD PRESSURE: 105 MMHG | RESPIRATION RATE: 16 BRPM | HEIGHT: 63 IN | WEIGHT: 202.38 LBS | DIASTOLIC BLOOD PRESSURE: 70 MMHG | HEART RATE: 68 BPM | TEMPERATURE: 97.3 F

## 2022-09-19 DIAGNOSIS — Z98.890 S/P LEEP: Primary | ICD-10-CM

## 2022-09-19 DIAGNOSIS — R87.613 HIGH GRADE SQUAMOUS INTRAEPITHELIAL LESION (HGSIL) ON CYTOLOGIC SMEAR OF CERVIX: ICD-10-CM

## 2022-09-19 DIAGNOSIS — G89.18 POST-OP PAIN: ICD-10-CM

## 2022-09-19 DIAGNOSIS — R87.613 PAPANICOLAOU SMEAR OF CERVIX WITH HIGH GRADE SQUAMOUS INTRAEPITHELIAL LESION (HGSIL): ICD-10-CM

## 2022-09-19 DIAGNOSIS — Z01.812 PRE-OPERATIVE LABORATORY EXAMINATION: ICD-10-CM

## 2022-09-19 DIAGNOSIS — R87.613 HGSIL (HIGH GRADE SQUAMOUS INTRAEPITHELIAL LESION) ON PAP SMEAR OF CERVIX: ICD-10-CM

## 2022-09-19 DIAGNOSIS — G89.18 POSTOPERATIVE PAIN: ICD-10-CM

## 2022-09-19 PROBLEM — N93.0 PCB (POST COITAL BLEEDING): Status: ACTIVE | Noted: 2022-09-19

## 2022-09-19 PROBLEM — R87.810 CERVICAL HIGH RISK HPV (HUMAN PAPILLOMAVIRUS) TEST POSITIVE: Status: ACTIVE | Noted: 2022-09-19

## 2022-09-19 LAB
ERYTHROCYTE [DISTWIDTH] IN BLOOD BY AUTOMATED COUNT: 12.3 % (ref 11.6–15.1)
EXT PREGNANCY TEST URINE: NEGATIVE
EXT. CONTROL: NORMAL
HCT VFR BLD AUTO: 41.6 % (ref 34.8–46.1)
HGB BLD-MCNC: 14.1 G/DL (ref 11.5–15.4)
MCH RBC QN AUTO: 30.5 PG (ref 26.8–34.3)
MCHC RBC AUTO-ENTMCNC: 33.9 G/DL (ref 31.4–37.4)
MCV RBC AUTO: 90 FL (ref 82–98)
PLATELET # BLD AUTO: 256 THOUSANDS/UL (ref 149–390)
PMV BLD AUTO: 10.5 FL (ref 8.9–12.7)
RBC # BLD AUTO: 4.62 MILLION/UL (ref 3.81–5.12)
WBC # BLD AUTO: 8.02 THOUSAND/UL (ref 4.31–10.16)

## 2022-09-19 PROCEDURE — 57522 CONIZATION OF CERVIX: CPT | Performed by: OBSTETRICS & GYNECOLOGY

## 2022-09-19 PROCEDURE — 81025 URINE PREGNANCY TEST: CPT | Performed by: OBSTETRICS & GYNECOLOGY

## 2022-09-19 PROCEDURE — 85027 COMPLETE CBC AUTOMATED: CPT | Performed by: OBSTETRICS & GYNECOLOGY

## 2022-09-19 PROCEDURE — 88342 IMHCHEM/IMCYTCHM 1ST ANTB: CPT | Performed by: PATHOLOGY

## 2022-09-19 PROCEDURE — 88307 TISSUE EXAM BY PATHOLOGIST: CPT | Performed by: PATHOLOGY

## 2022-09-19 PROCEDURE — 88344 IMHCHEM/IMCYTCHM EA MLT ANTB: CPT | Performed by: PATHOLOGY

## 2022-09-19 PROCEDURE — 88313 SPECIAL STAINS GROUP 2: CPT | Performed by: PATHOLOGY

## 2022-09-19 RX ORDER — ONDANSETRON 2 MG/ML
INJECTION INTRAMUSCULAR; INTRAVENOUS AS NEEDED
Status: DISCONTINUED | OUTPATIENT
Start: 2022-09-19 | End: 2022-09-19

## 2022-09-19 RX ORDER — PROPOFOL 10 MG/ML
INJECTION, EMULSION INTRAVENOUS AS NEEDED
Status: DISCONTINUED | OUTPATIENT
Start: 2022-09-19 | End: 2022-09-19

## 2022-09-19 RX ORDER — LIDOCAINE HYDROCHLORIDE 20 MG/ML
INJECTION, SOLUTION EPIDURAL; INFILTRATION; INTRACAUDAL; PERINEURAL AS NEEDED
Status: DISCONTINUED | OUTPATIENT
Start: 2022-09-19 | End: 2022-09-19

## 2022-09-19 RX ORDER — IBUPROFEN 600 MG/1
600 TABLET ORAL EVERY 6 HOURS PRN
Status: DISCONTINUED | OUTPATIENT
Start: 2022-09-19 | End: 2022-09-19 | Stop reason: HOSPADM

## 2022-09-19 RX ORDER — PROPOFOL 10 MG/ML
INJECTION, EMULSION INTRAVENOUS CONTINUOUS PRN
Status: DISCONTINUED | OUTPATIENT
Start: 2022-09-19 | End: 2022-09-19

## 2022-09-19 RX ORDER — SENNOSIDES 8.6 MG
1300 CAPSULE ORAL EVERY 8 HOURS PRN
Qty: 30 TABLET | Refills: 0
Start: 2022-09-19

## 2022-09-19 RX ORDER — EPHEDRINE SULFATE 50 MG/ML
INJECTION INTRAVENOUS AS NEEDED
Status: DISCONTINUED | OUTPATIENT
Start: 2022-09-19 | End: 2022-09-19

## 2022-09-19 RX ORDER — DEXAMETHASONE SODIUM PHOSPHATE 10 MG/ML
INJECTION, SOLUTION INTRAMUSCULAR; INTRAVENOUS AS NEEDED
Status: DISCONTINUED | OUTPATIENT
Start: 2022-09-19 | End: 2022-09-19

## 2022-09-19 RX ORDER — SODIUM CHLORIDE 9 MG/ML
125 INJECTION, SOLUTION INTRAVENOUS CONTINUOUS
Status: DISCONTINUED | OUTPATIENT
Start: 2022-09-19 | End: 2022-09-19 | Stop reason: HOSPADM

## 2022-09-19 RX ORDER — MIDAZOLAM HYDROCHLORIDE 2 MG/2ML
INJECTION, SOLUTION INTRAMUSCULAR; INTRAVENOUS AS NEEDED
Status: DISCONTINUED | OUTPATIENT
Start: 2022-09-19 | End: 2022-09-19

## 2022-09-19 RX ORDER — ONDANSETRON 2 MG/ML
4 INJECTION INTRAMUSCULAR; INTRAVENOUS EVERY 6 HOURS PRN
Status: DISCONTINUED | OUTPATIENT
Start: 2022-09-19 | End: 2022-09-19 | Stop reason: HOSPADM

## 2022-09-19 RX ORDER — IBUPROFEN 200 MG
600 TABLET ORAL EVERY 6 HOURS PRN
Start: 2022-09-19 | End: 2022-09-19 | Stop reason: HOSPADM

## 2022-09-19 RX ORDER — ACETAMINOPHEN 325 MG/1
975 TABLET ORAL EVERY 6 HOURS PRN
Status: DISCONTINUED | OUTPATIENT
Start: 2022-09-19 | End: 2022-09-19 | Stop reason: HOSPADM

## 2022-09-19 RX ORDER — ONDANSETRON 2 MG/ML
4 INJECTION INTRAMUSCULAR; INTRAVENOUS ONCE AS NEEDED
Status: DISCONTINUED | OUTPATIENT
Start: 2022-09-19 | End: 2022-09-19 | Stop reason: HOSPADM

## 2022-09-19 RX ORDER — DOCUSATE SODIUM 100 MG/1
100 CAPSULE, LIQUID FILLED ORAL 2 TIMES DAILY
Status: DISCONTINUED | OUTPATIENT
Start: 2022-09-19 | End: 2022-09-19 | Stop reason: HOSPADM

## 2022-09-19 RX ORDER — FENTANYL CITRATE/PF 50 MCG/ML
25 SYRINGE (ML) INJECTION
Status: COMPLETED | OUTPATIENT
Start: 2022-09-19 | End: 2022-09-19

## 2022-09-19 RX ORDER — ACETAMINOPHEN AND CODEINE PHOSPHATE 300; 30 MG/1; MG/1
1 TABLET ORAL EVERY 4 HOURS PRN
Qty: 4 TABLET | Refills: 0 | Status: SHIPPED | OUTPATIENT
Start: 2022-09-19

## 2022-09-19 RX ORDER — FENTANYL CITRATE 50 UG/ML
INJECTION, SOLUTION INTRAMUSCULAR; INTRAVENOUS AS NEEDED
Status: DISCONTINUED | OUTPATIENT
Start: 2022-09-19 | End: 2022-09-19

## 2022-09-19 RX ADMIN — LIDOCAINE HYDROCHLORIDE 100 MG: 20 INJECTION, SOLUTION EPIDURAL; INFILTRATION; INTRACAUDAL at 10:34

## 2022-09-19 RX ADMIN — PROPOFOL 200 MG: 10 INJECTION, EMULSION INTRAVENOUS at 10:34

## 2022-09-19 RX ADMIN — EPHEDRINE SULFATE 5 MG: 50 INJECTION, SOLUTION INTRAVENOUS at 10:57

## 2022-09-19 RX ADMIN — FENTANYL CITRATE 25 MCG: 50 INJECTION INTRAMUSCULAR; INTRAVENOUS at 11:46

## 2022-09-19 RX ADMIN — ONDANSETRON 4 MG: 2 INJECTION INTRAMUSCULAR; INTRAVENOUS at 10:38

## 2022-09-19 RX ADMIN — MIDAZOLAM 2 MG: 1 INJECTION INTRAMUSCULAR; INTRAVENOUS at 10:30

## 2022-09-19 RX ADMIN — SODIUM CHLORIDE: 0.9 INJECTION, SOLUTION INTRAVENOUS at 10:22

## 2022-09-19 RX ADMIN — DEXAMETHASONE SODIUM PHOSPHATE 8 MG: 10 INJECTION, SOLUTION INTRAMUSCULAR; INTRAVENOUS at 10:38

## 2022-09-19 RX ADMIN — FENTANYL CITRATE 50 MCG: 50 INJECTION INTRAMUSCULAR; INTRAVENOUS at 10:39

## 2022-09-19 RX ADMIN — PROPOFOL 120 MCG/KG/MIN: 10 INJECTION, EMULSION INTRAVENOUS at 10:34

## 2022-09-19 RX ADMIN — FENTANYL CITRATE 25 MCG: 50 INJECTION INTRAMUSCULAR; INTRAVENOUS at 11:34

## 2022-09-19 RX ADMIN — ACETAMINOPHEN 975 MG: 325 TABLET ORAL at 12:37

## 2022-09-19 RX ADMIN — DOCUSATE SODIUM 100 MG: 100 CAPSULE ORAL at 12:37

## 2022-09-19 RX ADMIN — FENTANYL CITRATE 25 MCG: 50 INJECTION INTRAMUSCULAR; INTRAVENOUS at 11:39

## 2022-09-19 RX ADMIN — FENTANYL CITRATE 25 MCG: 50 INJECTION INTRAMUSCULAR; INTRAVENOUS at 11:26

## 2022-09-19 NOTE — ANESTHESIA POSTPROCEDURE EVALUATION
Post-Op Assessment Note    CV Status:  Stable    Pain management: adequate     Mental Status:  Alert and awake   Hydration Status:  Euvolemic   PONV Controlled:  Controlled   Airway Patency:  Patent      Post Op Vitals Reviewed: Yes      Staff: Anesthesiologist         No complications documented      BP      Temp     Pulse     Resp      SpO2      /70   Pulse 68   Temp (!) 97 3 °F (36 3 °C) (Temporal)   Resp 16   Ht 5' 3" (1 6 m)   Wt 91 8 kg (202 lb 6 1 oz)   SpO2 96%   BMI 35 85 kg/m²

## 2022-09-19 NOTE — OP NOTE
OPERATIVE REPORT  PATIENT NAME: Rupinder Latham    :  1975  MRN: [id number]  Pt Location: [facility] OR ROOM 01    SURGERY DATE: 2022    Surgeon(s) and Role:     Lakeshia Mata, DO - Primary     * Nataliya Dowling, DO - Assisting     * Chayo Moseley MD - Fellow    Preop Diagnosis:  *Papanicolaou smear of cervix with high grade squamous intraepithelial lesion *(HGSIL) [R87 613]  *HPV 16 positive  *Postcoital bleeding     Post-Op Diagnosis Codes:     * Papanicolaou smear of cervix with high grade squamous intraepithelial lesion (HGSIL) [R87 613]    *HPv 16 positive  * Postcoital bleeding       Procedure(s) (LRB):  BIOPSY LEEP CERVIX (N/A)    Specimen(s):  ID Type Source Tests Collected by Time Destination   1 : Cervical Transformation Tissue Cervix TISSUE Danyel Topete, DO 2022 1048    2 : Anterior Cervix Tissue Cervix TISSUE EXAM Nansam Colbert, DO 2022 1050        Estimated Blood Loss: 10 ml    Drains:  * No LDAs found *    Anesthesia Type:   General LMA    Operative Indications:  Papanicolaou smear of cervix with high grade squamous intraepithelial lesion (HGSIL) [R87 613]  HPV 16 positive  Postcoital bleeding     Operative Findings:  1  External genitalia grossly normal in appearance  No ulcerations, no lacerations, no lesions  Bimanual exam revealed anteverted uterus with normal contours and freely mobile  No adnexal masses palpated bilaterally  2   Vagina was grossly normal in appearance without any lacerations or lesions  3  Cervix parous  Transformation zone with ectropion easily seen with prior biopsy site noted at 4 o'clock position  Well healing  Complications:   None apparent    Procedure and Technique:    The patient was taken to the operating room  General anesthsia was administered and found to be adequate  The patient was then positioned on the operating table in dorsolithotomy position with legs supported by stirrups and SCDs bilaterally   All pressure points were padded  Warm blanket was placed to maintain control of core body temperature  The patient was then prepped and draped in usual sterile fashion  A bimanual exam was performed and uterus is found to be anteverted  Operative technique: A timeout was performed to confirm correct patient and correct procedure  A bivalved coated speculum was inserted into the vagina and the cervix was visualized  The 20 x 15 mm (blue) loop electrode was selected and used to excise the cervical sample  The gross specimen was removed in two pieces  Lower specimen was tagged at 12 o'clock position  Upper specimen was tagged at 12 o'clock position and sent separately to pathology  Ball electrocautery was used to obtain adequate hemostasis  Monsel's was then applied to maintain hemostasis  Good hemostasis was confirmed  The bivalve speculum was removed from the vagina  At the completion of the procedure all needle, sponge, instrument counts were noted to be correct ×2  Dr Pretty Beckett was present for the entirety of the procedure      Patient Disposition:  PACU     SIGNATURE: Wing Stone DO  DATE: September 19, 2022  TIME: 11:06 AM

## 2022-09-19 NOTE — ANESTHESIA PREPROCEDURE EVALUATION
Procedure:  BIOPSY LEEP CERVIX (N/A Cervix)    Relevant Problems   CARDIO   (+) Hyperlipidemia      GI/HEPATIC   (+) Gastroesophageal reflux disease without esophagitis      NEURO/PSYCH   (+) Anxiety   (+) Depression, recurrent (HCC)   (+) History of syndactyly   (+) Recurrent major depressive disorder (HCC)      Other   (+) Mediastinal lymphadenopathy        Physical Exam    Airway    Mallampati score: II  TM Distance: >3 FB  Neck ROM: full     Dental   No notable dental hx     Cardiovascular  Rhythm: regular, Rate: normal, Cardiovascular exam normal    Pulmonary  Pulmonary exam normal Breath sounds clear to auscultation,     Other Findings        Anesthesia Plan  ASA Score- 2     Anesthesia Type- general with ASA Monitors  Additional Monitors:   Airway Plan:           Plan Factors-    Chart reviewed  EKG reviewed  Imaging results reviewed  Existing labs reviewed  Patient summary reviewed  Induction- intravenous  Postoperative Plan-     Informed Consent- Anesthetic plan and risks discussed with patient

## 2022-09-21 DIAGNOSIS — F41.9 ANXIETY: ICD-10-CM

## 2022-09-21 PROCEDURE — 88305 TISSUE EXAM BY PATHOLOGIST: CPT | Performed by: PATHOLOGY

## 2022-09-22 RX ORDER — LORAZEPAM 0.5 MG/1
1 TABLET ORAL
Qty: 90 TABLET | Refills: 0 | Status: SHIPPED | OUTPATIENT
Start: 2022-09-22

## 2022-09-23 NOTE — PROGRESS NOTES
Seen and examined no acute events doing well  AVSS afebrile  Soft +BS ND NT  Incisions cdi    S/p lap appy and partial cecotomy   Cont light duty for two more weeks, path reviewed and benign, f/u prn

## 2022-09-26 ENCOUNTER — TELEMEDICINE (OUTPATIENT)
Dept: INTERNAL MEDICINE CLINIC | Facility: CLINIC | Age: 47
End: 2022-09-26
Payer: COMMERCIAL

## 2022-09-26 VITALS — WEIGHT: 199.4 LBS | HEIGHT: 63 IN | BODY MASS INDEX: 35.33 KG/M2

## 2022-09-26 DIAGNOSIS — F41.9 ANXIETY: ICD-10-CM

## 2022-09-26 DIAGNOSIS — R59.0 MEDIASTINAL LYMPHADENOPATHY: ICD-10-CM

## 2022-09-26 DIAGNOSIS — F33.40 RECURRENT MAJOR DEPRESSIVE DISORDER, IN REMISSION (HCC): ICD-10-CM

## 2022-09-26 DIAGNOSIS — R87.613 HIGH GRADE SQUAMOUS INTRAEPITHELIAL LESION (HGSIL) ON CYTOLOGIC SMEAR OF CERVIX: Primary | ICD-10-CM

## 2022-09-26 DIAGNOSIS — Z98.84 HISTORY OF ROUX-EN-Y GASTRIC BYPASS: ICD-10-CM

## 2022-09-26 PROBLEM — K21.9 GASTROESOPHAGEAL REFLUX DISEASE WITHOUT ESOPHAGITIS: Status: RESOLVED | Noted: 2021-02-24 | Resolved: 2022-09-26

## 2022-09-26 PROBLEM — E66.813 CLASS 3 SEVERE OBESITY WITHOUT SERIOUS COMORBIDITY WITH BODY MASS INDEX (BMI) OF 40.0 TO 44.9 IN ADULT (HCC): Status: RESOLVED | Noted: 2021-02-25 | Resolved: 2022-09-26

## 2022-09-26 PROBLEM — E66.01 CLASS 3 SEVERE OBESITY WITHOUT SERIOUS COMORBIDITY WITH BODY MASS INDEX (BMI) OF 40.0 TO 44.9 IN ADULT (HCC): Status: RESOLVED | Noted: 2021-02-25 | Resolved: 2022-09-26

## 2022-09-26 PROBLEM — R09.82 POSTNASAL DRIP: Status: RESOLVED | Noted: 2022-02-07 | Resolved: 2022-09-26

## 2022-09-26 PROCEDURE — 88344 IMHCHEM/IMCYTCHM EA MLT ANTB: CPT | Performed by: PATHOLOGY

## 2022-09-26 PROCEDURE — 3725F SCREEN DEPRESSION PERFORMED: CPT | Performed by: INTERNAL MEDICINE

## 2022-09-26 PROCEDURE — 99213 OFFICE O/P EST LOW 20 MIN: CPT | Performed by: INTERNAL MEDICINE

## 2022-09-26 PROCEDURE — 88307 TISSUE EXAM BY PATHOLOGIST: CPT | Performed by: PATHOLOGY

## 2022-09-26 PROCEDURE — 88342 IMHCHEM/IMCYTCHM 1ST ANTB: CPT | Performed by: PATHOLOGY

## 2022-09-26 PROCEDURE — 88313 SPECIAL STAINS GROUP 2: CPT | Performed by: PATHOLOGY

## 2022-09-26 NOTE — ASSESSMENT & PLAN NOTE
Lorazepam p r n     Awaiting additional biopsy be pulse regarding her HGSIL  Patient likely will hysterectomy in the future

## 2022-09-26 NOTE — PROGRESS NOTES
Depression Screening Follow-up Plan: Patient's depression screening was positive with a PHQ-2 score of   Their PHQ-9 score was    {Depression screen follow-up plan:[id number]}
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
## 2022-09-26 NOTE — ASSESSMENT & PLAN NOTE
Most recent imaging shows resolution of adenopathy and ground-glass opacities in the lungs    Most likely inflammatory in nature possibly secondary to history of COVID infection

## 2022-09-29 ENCOUNTER — OFFICE VISIT (OUTPATIENT)
Dept: GYNECOLOGY | Facility: CLINIC | Age: 47
End: 2022-09-29

## 2022-09-29 VITALS
HEIGHT: 63 IN | DIASTOLIC BLOOD PRESSURE: 76 MMHG | BODY MASS INDEX: 35.26 KG/M2 | HEART RATE: 85 BPM | SYSTOLIC BLOOD PRESSURE: 116 MMHG | WEIGHT: 199 LBS

## 2022-09-29 DIAGNOSIS — N76.0 BV (BACTERIAL VAGINOSIS): ICD-10-CM

## 2022-09-29 DIAGNOSIS — B96.89 BV (BACTERIAL VAGINOSIS): ICD-10-CM

## 2022-09-29 DIAGNOSIS — Z48.89 POSTOPERATIVE VISIT: Primary | ICD-10-CM

## 2022-09-29 PROCEDURE — 99024 POSTOP FOLLOW-UP VISIT: CPT | Performed by: OBSTETRICS & GYNECOLOGY

## 2022-09-29 RX ORDER — METRONIDAZOLE 500 MG/1
500 TABLET ORAL EVERY 12 HOURS SCHEDULED
Qty: 14 TABLET | Refills: 0 | Status: SHIPPED | OUTPATIENT
Start: 2022-09-29 | End: 2022-10-06

## 2022-09-29 NOTE — PROGRESS NOTES
Patient presents for postoperative check  She is status post LEEP cone biopsy in September 19th she is doing well since the surgery other than an odorous vaginal discharge    Path report:  Diagnosis   A  Cervix, transformation, LEEP conization:  -  High-grade squamous intraepithelial lesion (HSIL/KASIA 3 with involvement of endocervical glands and extension to the yellow inked cervical os margin  -  The ectocervical margin is negative for dysplasia  -  Multiplex immunohistochemical stain for p16 and Ki-67 and p40 stain performed with appropriate controls show p40 expression in the squamous epithelium with areas of abnormal strong P 16 expression and elevated Ki-67 proliferative activity with intact reticulin meshwork supporting the diagnosis  B  Cervix, anterior, LEEP conization:  -  Low-grade squamous intraepithelial lesion (LSIL/KASIA 1) with focal changes suspicious for high-grade squamous intraepithelial lesion (HSIL/KASIA 2-3) involving the transformation zone  -  The resection edges are negative for features of high-grade dysplasia  -  Multiplex immunohistochemical stain for p16 and Ki-67 and p40 stain performed with appropriate controls show p40 expression in the squamous epithelium with areas of abnormal strong p16 expression and elevated Ki-67 proliferative activity with intact reticulin meshwork supporting the diagnosis  Electronically signed by Catalino Mejia MD     Physical exam: Cervix is healing well  There is evidence of a bacterial vaginosis  Uterus is anteverted normal size and contour freely mobile and nontender  There are no palpable adnexal masses or tenderness  Impression:  Postoperative check/bacterial vaginosis     Plan:  Metronidazole 500 mg b i d  for 1 week  She has been advised that she needs to return to the office for repeat Pap smear every 3-4 months for the next year

## 2022-10-06 ENCOUNTER — TELEPHONE (OUTPATIENT)
Dept: GYNECOLOGY | Facility: CLINIC | Age: 47
End: 2022-10-06

## 2022-10-06 NOTE — TELEPHONE ENCOUNTER
Pt called  Had surgery about 2 weeks ago w/ Dr Maximus Sidhu    Pt states that she is having some spotting, cramping, and is having "pulling sensation"  States she has finished antibiotic  If prescription needed, states pharmacy is yet to be decided because of location    Please Advise

## 2022-10-07 ENCOUNTER — DOCUMENTATION (OUTPATIENT)
Dept: GYNECOLOGY | Facility: CLINIC | Age: 47
End: 2022-10-07

## 2022-10-07 DIAGNOSIS — Z86.19 HISTORY OF COLD SORES: ICD-10-CM

## 2022-10-07 RX ORDER — VALACYCLOVIR HYDROCHLORIDE 500 MG/1
500 TABLET, FILM COATED ORAL EVERY OTHER DAY
Qty: 45 TABLET | Refills: 1 | Status: SHIPPED | OUTPATIENT
Start: 2022-10-07 | End: 2022-10-07 | Stop reason: SDUPTHER

## 2022-10-07 RX ORDER — VALACYCLOVIR HYDROCHLORIDE 500 MG/1
500 TABLET, FILM COATED ORAL DAILY
Qty: 90 TABLET | Refills: 1 | Status: SHIPPED | OUTPATIENT
Start: 2022-10-07 | End: 2023-04-05

## 2022-10-07 NOTE — TELEPHONE ENCOUNTER
Patient requesting to see if she can take prescription everyday due to cold sores on bottom lip  Also asking to see if there was any topical cream PCP may want to provide       Rx refill     Last Appt - 9/26/2022    Pharmacy - [facility]  # - [phone]

## 2022-10-11 PROBLEM — Z12.11 COLON CANCER SCREENING: Status: RESOLVED | Noted: 2022-06-20 | Resolved: 2022-10-11

## 2022-11-16 DIAGNOSIS — Z86.19 HISTORY OF COLD SORES: ICD-10-CM

## 2022-11-16 DIAGNOSIS — F41.9 ANXIETY: ICD-10-CM

## 2022-11-16 RX ORDER — ESCITALOPRAM OXALATE 20 MG/1
20 TABLET ORAL DAILY
Qty: 30 TABLET | Refills: 2 | Status: SHIPPED | OUTPATIENT
Start: 2022-11-16 | End: 2023-02-14

## 2022-11-16 RX ORDER — VALACYCLOVIR HYDROCHLORIDE 500 MG/1
500 TABLET, FILM COATED ORAL DAILY
Qty: 90 TABLET | Refills: 0 | Status: SHIPPED | OUTPATIENT
Start: 2022-11-16 | End: 2023-02-14

## 2023-01-19 ENCOUNTER — OFFICE VISIT (OUTPATIENT)
Dept: GYNECOLOGY | Facility: CLINIC | Age: 48
End: 2023-01-19

## 2023-01-19 VITALS
WEIGHT: 193.8 LBS | DIASTOLIC BLOOD PRESSURE: 80 MMHG | SYSTOLIC BLOOD PRESSURE: 140 MMHG | HEART RATE: 78 BPM | BODY MASS INDEX: 34.33 KG/M2

## 2023-01-19 DIAGNOSIS — D06.9 CIN III (CERVICAL INTRAEPITHELIAL NEOPLASIA III): Primary | ICD-10-CM

## 2023-01-19 NOTE — PROGRESS NOTES
Patient is status post LEEP cone biopsy on September 29  Diagnosis   A  Cervix, transformation, LEEP conization:  -  High-grade squamous intraepithelial lesion (HSIL/KASIA 3 with involvement of endocervical glands and extension to the yellow inked cervical os margin  -  The ectocervical margin is negative for dysplasia  -  Multiplex immunohistochemical stain for p16 and Ki-67 and p40 stain performed with appropriate controls show p40 expression in the squamous epithelium with areas of abnormal strong P 16 expression and elevated Ki-67 proliferative activity with intact reticulin meshwork supporting the diagnosis  B  Cervix, anterior, LEEP conization:  -  Low-grade squamous intraepithelial lesion (LSIL/KASIA 1) with focal changes suspicious for high-grade squamous intraepithelial lesion (HSIL/KASIA 2-3) involving the transformation zone  -  The resection edges are negative for features of high-grade dysplasia  -  Multiplex immunohistochemical stain for p16 and Ki-67 and p40 stain performed with appropriate controls show p40 expression in the squamous epithelium with areas of abnormal strong p16 expression and elevated Ki-67 proliferative activity with intact reticulin meshwork supporting the diagnosis  Patient presents the office today for repeat Pap smear  She offers no complaints  Physical exam: Uterus is anteverted normal size and contour freely mobile and nontender  Cervix appears normal although it is flush with the vaginal canal posterior aspect  No palp adnexal masses or tenderness      Impression: KASIA-3    Plan: Return to the office in 6 months for annual exam/repeat Pap smear pending present Pap results

## 2023-01-26 ENCOUNTER — TELEPHONE (OUTPATIENT)
Dept: GYNECOLOGY | Facility: CLINIC | Age: 48
End: 2023-01-26

## 2023-01-26 DIAGNOSIS — N76.0 BV (BACTERIAL VAGINOSIS): Primary | ICD-10-CM

## 2023-01-26 DIAGNOSIS — B96.89 BV (BACTERIAL VAGINOSIS): Primary | ICD-10-CM

## 2023-01-26 LAB
LAB AP GYN PRIMARY INTERPRETATION: NORMAL
Lab: NORMAL
PATH INTERP SPEC-IMP: NORMAL

## 2023-01-26 RX ORDER — METRONIDAZOLE 500 MG/1
500 TABLET ORAL EVERY 12 HOURS SCHEDULED
Qty: 14 TABLET | Refills: 0 | Status: SHIPPED | OUTPATIENT
Start: 2023-01-26 | End: 2023-02-02

## 2023-01-26 NOTE — TELEPHONE ENCOUNTER
Spoke to Shawn Cantu and relayed message Pt   Aware to  Flagyl  Please send to [facility] [place] in [address] [place] [address]

## 2023-01-26 NOTE — TELEPHONE ENCOUNTER
Patient called and would like to discuss results from PAP  She wants to know if HPV was done  She also states it looks like she has some type of infection and is wondering if antibiotic will be called in  If so, she states she only wants pill form    Please call her to discuss

## 2023-02-01 ENCOUNTER — TELEPHONE (OUTPATIENT)
Dept: GYNECOLOGY | Facility: CLINIC | Age: 48
End: 2023-02-01

## 2023-02-01 DIAGNOSIS — N76.0 VAGINITIS AND VULVOVAGINITIS: Primary | ICD-10-CM

## 2023-02-01 RX ORDER — FLUCONAZOLE 150 MG/1
150 TABLET ORAL ONCE
Qty: 1 TABLET | Refills: 0 | Status: SHIPPED | OUTPATIENT
Start: 2023-02-01 | End: 2023-02-01

## 2023-02-01 NOTE — TELEPHONE ENCOUNTER
Patient called and states she is starting with yeast infection  Could Diflucan be sent to pharmacy?    Please send to [address] [place] [address] in [facility]

## 2023-02-16 DIAGNOSIS — F41.9 ANXIETY: ICD-10-CM

## 2023-02-16 DIAGNOSIS — Z86.19 HISTORY OF COLD SORES: ICD-10-CM

## 2023-02-16 RX ORDER — VALACYCLOVIR HYDROCHLORIDE 500 MG/1
500 TABLET, FILM COATED ORAL DAILY
Qty: 90 TABLET | Refills: 1 | Status: SHIPPED | OUTPATIENT
Start: 2023-02-16 | End: 2023-05-17

## 2023-02-16 RX ORDER — ESCITALOPRAM OXALATE 20 MG/1
20 TABLET ORAL DAILY
Qty: 90 TABLET | Refills: 1 | Status: SHIPPED | OUTPATIENT
Start: 2023-02-16 | End: 2023-05-17

## 2023-04-06 ENCOUNTER — OFFICE VISIT (OUTPATIENT)
Dept: INTERNAL MEDICINE CLINIC | Facility: CLINIC | Age: 48
End: 2023-04-06

## 2023-04-06 VITALS
TEMPERATURE: 99 F | OXYGEN SATURATION: 97 % | BODY MASS INDEX: 33.7 KG/M2 | DIASTOLIC BLOOD PRESSURE: 84 MMHG | SYSTOLIC BLOOD PRESSURE: 117 MMHG | HEIGHT: 64 IN | HEART RATE: 74 BPM | WEIGHT: 197.4 LBS

## 2023-04-06 DIAGNOSIS — J30.2 SEASONAL ALLERGIES: ICD-10-CM

## 2023-04-06 DIAGNOSIS — F33.40 RECURRENT MAJOR DEPRESSIVE DISORDER, IN REMISSION (HCC): ICD-10-CM

## 2023-04-06 DIAGNOSIS — Z12.31 ENCOUNTER FOR SCREENING MAMMOGRAM FOR MALIGNANT NEOPLASM OF BREAST: Primary | ICD-10-CM

## 2023-04-06 DIAGNOSIS — Z98.84 HISTORY OF ROUX-EN-Y GASTRIC BYPASS: ICD-10-CM

## 2023-04-06 DIAGNOSIS — F41.9 ANXIETY: ICD-10-CM

## 2023-04-06 RX ORDER — MONTELUKAST SODIUM 10 MG/1
10 TABLET ORAL
Qty: 90 TABLET | Refills: 1 | Status: SHIPPED | OUTPATIENT
Start: 2023-04-06

## 2023-04-06 RX ORDER — LORAZEPAM 0.5 MG/1
1 TABLET ORAL
Qty: 90 TABLET | Refills: 0 | Status: SHIPPED | OUTPATIENT
Start: 2023-04-06

## 2023-04-06 NOTE — ASSESSMENT & PLAN NOTE
She is doing very well  Her weight is down to 197 pounds and BMI 34 25  This represents almost a 90 pound weight loss from her previous baseline    She follows with bariatric medicine at the moment

## 2023-04-06 NOTE — ASSESSMENT & PLAN NOTE
There is some mild mucosal swelling of the turbinates and the nasal mucosa    We will initiate a trial of Singulair, 10 mg daily

## 2023-04-06 NOTE — PROGRESS NOTES
Name: Nirali Santos      : 1975      MRN: [id number]  Encounter Provider: Froilan Moss MD  Encounter Date: 2023   Encounter department: [address] [place] [address]     1  Encounter for screening mammogram for malignant neoplasm of breast  -     Mammo screening bilateral w 3d & cad; Future; Expected date: 2023    2  Anxiety  Assessment & Plan:  Improved lorazepam was renewed  Orders:  -     LORazepam (ATIVAN) 0 5 mg tablet; Take 2 tablets (1 mg total) by mouth daily at bedtime as needed for anxiety    3  History of Michael-en-Y gastric bypass  Assessment & Plan:  She is doing very well  Her weight is down to 197 pounds and BMI 34 25  This represents almost a 90 pound weight loss from her previous baseline  She follows with bariatric medicine at the moment      4  Seasonal allergies  Assessment & Plan: We will initiate a trial of Singulair, 10 mg daily    Orders:  -     montelukast (SINGULAIR) 10 mg tablet; Take 1 tablet (10 mg total) by mouth daily at bedtime         Subjective      Patient presents to the office for follow-up visit  She is doing quite well  Her weight loss appears to have leveled off a little bit since her bypass surgery  She has lost approximately 90 pounds total weight since the time of her surgery  She has undergone some LEEP conization treatments with gynecology for normal Pap smear and her most recent Pap smear was normal   Continues with routine follow-up with GYN  She is due for follow-up with bariatric medicine as well in regard to her bypass surgery  She has done very well she has no major symptoms of reflux  She has no abdominal pain she has maintained a weight loss of 90 pounds since the time of her surgery  Some anxiety issues remain as well as some mild depression symptoms but all is stable on current medications  She complains of some increased sinus symptomatology especially on the right side    She denies any fevers or headaches  Medications were renewed  She has not had any recent labs    Review of Systems   Constitutional: Negative  HENT: Positive for mouth sores (Occasional herpes simplex outbreaks) and sinus pressure  Negative for congestion, dental problem, ear pain and sore throat  Eyes: Negative  Respiratory: Negative  Cardiovascular: Negative  Gastrointestinal: Negative  Endocrine: Negative  Genitourinary: Negative  Musculoskeletal: Negative  Skin: Negative  Allergic/Immunologic: Negative  Neurological: Negative  Hematological: Negative  Psychiatric/Behavioral: Negative          Current Outpatient Medications on File Prior to Visit   Medication Sig   • acetaminophen (TYLENOL) 325 mg tablet Take 2 tablets (650 mg total) by mouth every 6 (six) hours as needed for mild pain or moderate pain   • CALCIUM CITRATE PO Take by mouth 3 chews per day   • escitalopram (LEXAPRO) 20 mg tablet Take 1 tablet (20 mg total) by mouth daily   • FIBER ADULT GUMMIES PO Take by mouth   • multivitamin (THERAGRAN) TABS Take 1 tablet by mouth daily Alive    • Probiotic Product (PRO-BIOTIC BLEND PO) Take by mouth in the morning   • simethicone (MYLICON) 361 MG chewable tablet Chew 125 mg if needed for flatulence chews   • valACYclovir (VALTREX) 500 mg tablet Take 1 tablet (500 mg total) by mouth daily   • [DISCONTINUED] LORazepam (ATIVAN) 0 5 mg tablet Take 2 tablets (1 mg total) by mouth daily at bedtime (Patient taking differently: Take 1 mg by mouth daily at bedtime as needed)   • [DISCONTINUED] acetaminophen (TYLENOL) 650 mg CR tablet Take 2 tablets (1,300 mg total) by mouth every 8 (eight) hours as needed for mild pain   • [DISCONTINUED] Collagen-Vitamin C (COLLAGEN PLUS VITAMIN C PO) Take 1,500 mg by mouth daily   • [DISCONTINUED] ibuprofen (MOTRIN) 200 mg tablet Take 3 tablets (600 mg total) by mouth every 6 (six) hours as needed for mild pain       Objective     /84 (BP "Location: Left arm, Patient Position: Sitting, Cuff Size: Large)   Pulse 74   Temp 99 °F (37 2 °C) (Tympanic)   Ht 5' 3 66\" (1 617 m)   Wt 89 5 kg (197 lb 6 4 oz)   SpO2 97%   BMI 34 25 kg/m²     Physical Exam  Vitals reviewed  Constitutional:       General: She is not in acute distress  Appearance: Normal appearance  She is not ill-appearing, toxic-appearing or diaphoretic  HENT:      Head: Normocephalic and atraumatic  Right Ear: External ear normal       Left Ear: External ear normal       Nose: Congestion (Very mild congestion) present  No nasal deformity, septal deviation or rhinorrhea  Right Turbinates: Swollen (Mildly, 1-2+)  Left Turbinates: Not swollen (Mildly, 1+)  Right Sinus: No maxillary sinus tenderness or frontal sinus tenderness  Left Sinus: No maxillary sinus tenderness or frontal sinus tenderness  Eyes:      Conjunctiva/sclera: Conjunctivae normal       Pupils: Pupils are equal, round, and reactive to light  Cardiovascular:      Rate and Rhythm: Normal rate and regular rhythm  Pulses: Normal pulses  Heart sounds: Normal heart sounds  No murmur heard  Pulmonary:      Effort: Pulmonary effort is normal  No respiratory distress  Breath sounds: Normal breath sounds  No wheezing or rales  Abdominal:      General: Abdomen is flat  There is no distension  Musculoskeletal:         General: No swelling  Cervical back: Neck supple  No rigidity  Right lower leg: No edema  Left lower leg: No edema  Skin:     Coloration: Skin is not jaundiced  Findings: No bruising, erythema or rash  Neurological:      General: No focal deficit present  Mental Status: She is alert and oriented to person, place, and time  Mental status is at baseline     Psychiatric:         Mood and Affect: Mood normal          Behavior: Behavior normal        Christian Dey MD  "

## 2023-06-01 ENCOUNTER — TELEPHONE (OUTPATIENT)
Dept: BARIATRICS | Facility: CLINIC | Age: 48
End: 2023-06-01

## 2023-08-11 DIAGNOSIS — F41.9 ANXIETY: ICD-10-CM

## 2023-08-11 DIAGNOSIS — Z86.19 HISTORY OF COLD SORES: ICD-10-CM

## 2023-08-11 DIAGNOSIS — J30.2 SEASONAL ALLERGIES: ICD-10-CM

## 2023-08-11 RX ORDER — LORAZEPAM 0.5 MG/1
1 TABLET ORAL
Qty: 60 TABLET | Refills: 0 | Status: SHIPPED | OUTPATIENT
Start: 2023-08-11

## 2023-08-11 RX ORDER — MONTELUKAST SODIUM 10 MG/1
10 TABLET ORAL
Qty: 90 TABLET | Refills: 1 | Status: SHIPPED | OUTPATIENT
Start: 2023-08-11

## 2023-08-11 RX ORDER — VALACYCLOVIR HYDROCHLORIDE 500 MG/1
500 TABLET, FILM COATED ORAL DAILY
Qty: 90 TABLET | Refills: 1 | Status: SHIPPED | OUTPATIENT
Start: 2023-08-11 | End: 2023-11-09

## 2023-08-11 RX ORDER — ESCITALOPRAM OXALATE 20 MG/1
20 TABLET ORAL DAILY
Qty: 90 TABLET | Refills: 1 | Status: SHIPPED | OUTPATIENT
Start: 2023-08-11 | End: 2023-11-09

## 2023-08-18 ENCOUNTER — OFFICE VISIT (OUTPATIENT)
Dept: GYNECOLOGY | Facility: CLINIC | Age: 48
End: 2023-08-18
Payer: COMMERCIAL

## 2023-08-18 VITALS — DIASTOLIC BLOOD PRESSURE: 60 MMHG | HEART RATE: 79 BPM | SYSTOLIC BLOOD PRESSURE: 110 MMHG

## 2023-08-18 DIAGNOSIS — D06.9 CIN III (CERVICAL INTRAEPITHELIAL NEOPLASIA GRADE III) WITH SEVERE DYSPLASIA: Primary | ICD-10-CM

## 2023-08-18 PROCEDURE — 99212 OFFICE O/P EST SF 10 MIN: CPT | Performed by: OBSTETRICS & GYNECOLOGY

## 2023-08-18 PROCEDURE — 88175 CYTOPATH C/V AUTO FLUID REDO: CPT | Performed by: OBSTETRICS & GYNECOLOGY

## 2023-08-18 NOTE — PROGRESS NOTES
Patient presents for repeat Pap smear. She is status post LEEP procedure September 2022 secondary to KASIA-3. She had a repeat Pap smear in January of this year which was normal.  She offers no complaints. Denies any vaginal irritation burning or discharge. No irregular bleeding. Physical exam: Uterus is anteverted normal size and contour freely mobile and nontender. No palpable adnexal masses or tenderness.   Cervix appears normal.  Vagina appears normal.    Impression: History of KASIA-3    Plan: Await results of present Pap smear assuming this is normal she will return to the office in 6 months for annual examination

## 2023-08-24 LAB
LAB AP GYN PRIMARY INTERPRETATION: NORMAL
Lab: NORMAL

## 2023-10-04 ENCOUNTER — HOSPITAL ENCOUNTER (OUTPATIENT)
Age: 48
Discharge: HOME | End: 2023-10-04
Payer: COMMERCIAL

## 2023-10-04 VITALS — BODY MASS INDEX: 33.63 KG/M2 | WEIGHT: 197 LBS | HEIGHT: 64 IN

## 2023-10-04 DIAGNOSIS — Z12.31 ENCOUNTER FOR SCREENING MAMMOGRAM FOR MALIGNANT NEOPLASM OF BREAST: ICD-10-CM

## 2023-10-04 PROCEDURE — 77067 SCR MAMMO BI INCL CAD: CPT

## 2023-10-04 PROCEDURE — 77063 BREAST TOMOSYNTHESIS BI: CPT

## 2023-12-01 ENCOUNTER — OFFICE VISIT (OUTPATIENT)
Age: 48
End: 2023-12-01
Payer: COMMERCIAL

## 2023-12-01 VITALS
SYSTOLIC BLOOD PRESSURE: 126 MMHG | TEMPERATURE: 98 F | BODY MASS INDEX: 35.08 KG/M2 | WEIGHT: 198 LBS | HEART RATE: 72 BPM | DIASTOLIC BLOOD PRESSURE: 84 MMHG | OXYGEN SATURATION: 99 % | HEIGHT: 63 IN

## 2023-12-01 DIAGNOSIS — F33.9 EPISODE OF RECURRENT MAJOR DEPRESSIVE DISORDER, UNSPECIFIED DEPRESSION EPISODE SEVERITY (HCC): ICD-10-CM

## 2023-12-01 DIAGNOSIS — B00.89 RECURRENT ORAL HERPES SIMPLEX INFECTION: Primary | ICD-10-CM

## 2023-12-01 DIAGNOSIS — F41.9 ANXIETY: ICD-10-CM

## 2023-12-01 DIAGNOSIS — Z98.84 HISTORY OF ROUX-EN-Y GASTRIC BYPASS: ICD-10-CM

## 2023-12-01 PROCEDURE — 99213 OFFICE O/P EST LOW 20 MIN: CPT | Performed by: INTERNAL MEDICINE

## 2023-12-01 RX ORDER — TRAZODONE HYDROCHLORIDE 50 MG/1
50 TABLET ORAL
Qty: 90 TABLET | Refills: 1 | Status: SHIPPED | OUTPATIENT
Start: 2023-12-01

## 2023-12-01 RX ORDER — ACYCLOVIR 50 MG/G
CREAM TOPICAL EVERY 8 HOURS PRN
Qty: 5 G | Refills: 3 | Status: SHIPPED | OUTPATIENT
Start: 2023-12-01

## 2023-12-01 RX ORDER — LORAZEPAM 0.5 MG/1
1 TABLET ORAL
Qty: 60 TABLET | Refills: 0 | Status: SHIPPED | OUTPATIENT
Start: 2023-12-01

## 2023-12-01 NOTE — PROGRESS NOTES
Name: Adolfo Nieto      : 1975      MRN: [id number]  Encounter Provider: Tammy Calvo MD  Encounter Date: 2023   Encounter department: [facility]     1. Recurrent oral herpes simplex infection  -     acyclovir (ZOVIRAX) 5 % cream; Apply topically every 8 (eight) hours as needed (lesions)    2. Anxiety  Assessment & Plan:  Trazodone and lorazepam were renewed. Orders:  -     LORazepam (ATIVAN) 0.5 mg tablet; Take 2 tablets (1 mg total) by mouth daily at bedtime as needed for anxiety    3. Episode of recurrent major depressive disorder, unspecified depression episode severity ([address])  Assessment & Plan: This is an anniversary of sorts involving the passing of both parents of the patient from advanced cancer. Is a difficult time emotionally for the patient    Orders:  -     traZODone (DESYREL) 50 mg tablet; Take 1 tablet (50 mg total) by mouth daily at bedtime    4. History of Michael-en-Y gastric bypass  Assessment & Plan:  Patient was given prescriptions to obtain bariatric labs    Orders:  -     CBC; Future  -     Iron; Future  -     Vitamin D 25 hydroxy; Future  -     Vitamin B12; Future  -     Ferritin; Future  -     Vitamin B1, whole blood; Future  -     PTH, intact; Future  -     Vitamin A; Future  -     Folate; Future           Subjective      Patient is seen for follow-up visit long absence. She is generally doing well. This is a difficult time of year for her because it is the anniversary of both of her parents passing away from advanced cancer. She has some difficulty falling asleep and staying asleep. Previously she had been relying on a combination lorazepam and trazodone. Lorazepam was used for panic attacks and trazodone was used as a sleep aid but sometimes she has been using Unisom over-the-counter.   She is doing rather well from a weight perspective having only gained about three quarters of a pound over the past 8 months. Keeps her appetite under control and she is taking all of her bariatric vitamins but she has not had any blood work in over a year. She has had some intermittent episodes of recurrent oral herpes simplex. Mouth Lesions   Associated symptoms include mouth sores. Review of Systems   Constitutional: Negative. HENT:  Positive for mouth sores. Eyes: Negative. Respiratory: Negative. Cardiovascular: Negative. Gastrointestinal: Negative. Endocrine: Negative. Genitourinary: Negative. Musculoskeletal: Negative. Skin: Negative. Allergic/Immunologic: Negative. Neurological: Negative. Hematological: Negative. Psychiatric/Behavioral:  Positive for sleep disturbance (Unable to sleep through the night). Depression Screening Follow-up Plan: Patient's depression screening was positive with a PHQ-2 score of . Their PHQ-9 score was 4. Patient advised to follow-up with PCP for further management.     Current Outpatient Medications on File Prior to Visit   Medication Sig    acetaminophen (TYLENOL) 325 mg tablet Take 2 tablets (650 mg total) by mouth every 6 (six) hours as needed for mild pain or moderate pain    CALCIUM CITRATE PO Take by mouth 3 chews per day    escitalopram (LEXAPRO) 20 mg tablet Take 1 tablet (20 mg total) by mouth daily    FIBER ADULT GUMMIES PO Take by mouth    montelukast (SINGULAIR) 10 mg tablet Take 1 tablet (10 mg total) by mouth daily at bedtime    multivitamin (THERAGRAN) TABS Take 1 tablet by mouth daily Alive     Probiotic Product (PRO-BIOTIC BLEND PO) Take by mouth in the morning    simethicone (MYLICON) 771 MG chewable tablet Chew 125 mg if needed for flatulence chews    valACYclovir (VALTREX) 500 mg tablet Take 1 tablet (500 mg total) by mouth daily    [DISCONTINUED] LORazepam (ATIVAN) 0.5 mg tablet Take 2 tablets (1 mg total) by mouth daily at bedtime as needed for anxiety    [DISCONTINUED] ibuprofen (MOTRIN) 200 mg tablet Take 3 tablets (600 mg total) by mouth every 6 (six) hours as needed for mild pain       Objective     /84 (BP Location: Left arm, Patient Position: Sitting, Cuff Size: Adult)   Pulse 72   Temp 98 °F (36.7 °C)   Ht 5' 3" (1.6 m)   Wt 89.8 kg (198 lb)   SpO2 99%   BMI 35.07 kg/m²     Physical Exam  Vitals reviewed. Constitutional:       General: She is not in acute distress. Appearance: Normal appearance. She is obese. She is not ill-appearing, toxic-appearing or diaphoretic. HENT:      Head: Normocephalic and atraumatic. Right Ear: External ear normal.      Left Ear: External ear normal.   Eyes:      General: No scleral icterus. Conjunctiva/sclera: Conjunctivae normal.      Pupils: Pupils are equal, round, and reactive to light. Cardiovascular:      Rate and Rhythm: Normal rate and regular rhythm. Heart sounds: Normal heart sounds. No murmur heard. Pulmonary:      Effort: Pulmonary effort is normal. No respiratory distress. Breath sounds: Normal breath sounds. Abdominal:      General: There is no distension. Tenderness: There is no abdominal tenderness. Musculoskeletal:      Cervical back: Neck supple. Right lower leg: No edema. Left lower leg: No edema. Skin:     General: Skin is warm. Coloration: Skin is not jaundiced. Findings: No bruising, erythema or rash. Neurological:      General: No focal deficit present. Mental Status: She is alert and oriented to person, place, and time. Mental status is at baseline.    Psychiatric:         Mood and Affect: Mood normal.         Behavior: Behavior normal.       Sahil Sun MD

## 2023-12-01 NOTE — ASSESSMENT & PLAN NOTE
This is an anniversary of sorts involving the passing of both parents of the patient from advanced cancer.   Is a difficult time emotionally for the patient

## 2024-01-10 DIAGNOSIS — Z86.19 HISTORY OF COLD SORES: ICD-10-CM

## 2024-01-10 DIAGNOSIS — F41.9 ANXIETY: ICD-10-CM

## 2024-01-10 DIAGNOSIS — J30.2 SEASONAL ALLERGIES: ICD-10-CM

## 2024-01-10 RX ORDER — ESCITALOPRAM OXALATE 20 MG/1
20 TABLET ORAL DAILY
Qty: 90 TABLET | Refills: 1 | Status: SHIPPED | OUTPATIENT
Start: 2024-01-10 | End: 2024-07-08

## 2024-01-10 RX ORDER — LORAZEPAM 0.5 MG/1
1 TABLET ORAL
Qty: 60 TABLET | Refills: 0 | Status: CANCELLED | OUTPATIENT
Start: 2024-01-10

## 2024-01-10 RX ORDER — VALACYCLOVIR HYDROCHLORIDE 500 MG/1
500 TABLET, FILM COATED ORAL DAILY
Qty: 90 TABLET | Refills: 1 | Status: SHIPPED | OUTPATIENT
Start: 2024-01-10 | End: 2024-07-08

## 2024-01-10 RX ORDER — LORAZEPAM 0.5 MG/1
1 TABLET ORAL
Qty: 60 TABLET | Refills: 0 | Status: SHIPPED | OUTPATIENT
Start: 2024-01-10

## 2024-01-10 NOTE — TELEPHONE ENCOUNTER
Last office visit 10/6  Next Office Visit 6/14 patient fills at [facility] pharmacy in [place] unable to pull up in pdmp last sent to pharmacy 12/1/23

## 2024-03-07 DIAGNOSIS — F41.9 ANXIETY: ICD-10-CM

## 2024-03-08 NOTE — TELEPHONE ENCOUNTER
Last office visit 12/1  Next Office Visit 6/14 needs coverage. Not on pdmp patient lives in [place] last written 1/10/2024

## 2024-03-08 NOTE — TELEPHONE ENCOUNTER
I'm not sure how we are monitoring this. I am not able to see the [place] PDMP. Can send to PCP on Monday    PT Name: Kendall Duff  MR #: [id number]     DOCUMENTATION CLARIFICATION     CDS/: Petar Russo Jr, RN CCDS              Contact information:[email or web address]   This form is a permanent document in the medical record.     Query Date: January 4, 2023    By submitting this query, we are merely seeking further clarification of documentation.  Please utilize your independent clinical judgment when addressing the question(s) below.    The Medical Record contains the following   Indicators Supporting Clinical Findings Location in Medical Record   x Heart Failure documented Acute on chronic congestive heart failure, unspecified heart failure type    Acute congestive heart failure exacerbation   12/18 ED Provider Notes      12/18 H&P   x BNP 3135.6   12/18 Labs   x EF/Echo Summary    ? Eccentric hypertrophy and severely decreased systolic function. The estimated ejection fraction is 20-25%.  ? Grade III left ventricular diastolic dysfunction.  ? Mild right ventricular enlargement with mildly reduced right ventricular systolic function.  ? Mild right atrial enlargement.  ? Moderate mitral regurgitation.  ? Severe tricuspid regurgitation.  ? The estimated PA systolic pressure is 33 mmHg   12/18 Echo    Radiology findings     x Subjective/Objective Respiratory Conditions Chief Complaint: Shortness of Breath (Pt reports SOB while at [employer] this morning. No distress at this time. LVAD pt. ) 12/18 H&P    Recent/Current MI      Heart Transplant, LVAD     x Edema, JVD Heart:  Bradycardic rate and rhythm.  Edema lower extremities. 12/18 H&P    Ascites     x Diuretics/Meds furosemide injection 40 mg 12/18 MAR    Other Treatment      Other       Heart failure is a clinical diagnosis which includes symptomatic fluid retention, elevated intracardiac pressures, and/or the inability of the heart to deliver adequate blood flow.    Heart Failure with reduced Ejection Fraction (HFrEF) or Systolic Heart Failure (loses ability to  contract normally, EF is <40%)    Heart Failure with preserved Ejection Fraction (HFpEF) or Diastolic Heart Failure (stiff ventricles, does not relax properly, EF is >50%)     Heart Failure with Combined Systolic and Diastolic Failure (stiff ventricles, does not relax properly and EF is <50%)    Mid-range or mildly reduced ejection fraction (HFmrEF) is classified as systolic heart failure.  Congestive heart failure with a recovered EF is classified as Diastolic Heart Failure.  Common clues to acute exacerbation:  Rapidly progressive symptoms (w/in 2 weeks of presentation), using IV diuretics, using supplemental O2, pulmonary edema on Xray, new or worsening pleural effusion, +JVD or other signs of volume overload, MI w/in 4 weeks, and/or BNP >500  The clinical guidelines noted are only system guidelines, and do not replace the providers clinical judgment.    Provider, please specify the type of         Acute on Chronic Heart Failure              [   ]  Acute on Chronic Systolic Heart Failure (HFrEF or HFmrEF) - worsening of CHF signs/symptoms in preexisting CHF   [  x ]  Acute on Chronic Combined Systolic and Diastolic Heart Failure - worsening of CHF signs/symptoms in preexisting CHF   [   ]  Other (please specify): ____________           Please document in your progress notes daily for the duration of treatment until resolved and include in your discharge summary.    References:  American Heart Association editorial staff. (2017, May). Ejection Fraction Heart Failure Measurement. American Heart Association. https://www.heart.org/en/health-topics/heart-failure/diagnosing-heart-failure/ejection-fraction-heart-failure-measurement#:~:text=Ejection%20fraction%20(EF)%20is%20a,pushed%20out%20with%20each%20heartbeLISSETTE Bangura (2020, December 15). Heart failure with preserved ejection fraction: Clinical manifestations and diagnosis. UpToDate.  https://www.[employer].Crowdrally/contents/heart-failure-with-preserved-ejection-fraction-clinical-manifestations-and-diagnosis.  ICD-10-CM/PCS Coding Clinic Third Quarter ICD-10, Effective with discharges: September 8, 2020 [facility] § Heart failure with mid-range or mildly reduced ejection fraction (2020).  ICD-10-CM/PCS Coding Clinic Third Quarter ICD-10, Effective with discharges: September 8, 2020 [facility] § Heart failure with recovered ejection fraction (2020).  Form No. 86249

## 2024-03-11 RX ORDER — LORAZEPAM 0.5 MG/1
1 TABLET ORAL
Qty: 60 TABLET | Refills: 0 | Status: SHIPPED | OUTPATIENT
Start: 2024-03-11

## 2024-03-27 ENCOUNTER — ANNUAL EXAM (OUTPATIENT)
Dept: GYNECOLOGY | Facility: CLINIC | Age: 49
End: 2024-03-27
Payer: COMMERCIAL

## 2024-03-27 VITALS
HEIGHT: 63 IN | DIASTOLIC BLOOD PRESSURE: 62 MMHG | HEART RATE: 85 BPM | BODY MASS INDEX: 35.07 KG/M2 | SYSTOLIC BLOOD PRESSURE: 122 MMHG

## 2024-03-27 DIAGNOSIS — N87.9 CERVICAL INTRAEPITHELIAL NEOPLASIA (CIN): ICD-10-CM

## 2024-03-27 DIAGNOSIS — Z01.419 ENCOUNTER FOR GYNECOLOGICAL EXAMINATION WITHOUT ABNORMAL FINDING: Primary | ICD-10-CM

## 2024-03-27 DIAGNOSIS — Z01.419 ENCOUNTER FOR GYNECOLOGICAL EXAMINATION WITH PAPANICOLAOU SMEAR OF CERVIX: ICD-10-CM

## 2024-03-27 PROCEDURE — 99396 PREV VISIT EST AGE 40-64: CPT | Performed by: OBSTETRICS & GYNECOLOGY

## 2024-03-27 PROCEDURE — G0145 SCR C/V CYTO,THINLAYER,RESCR: HCPCS | Performed by: OBSTETRICS & GYNECOLOGY

## 2024-03-27 NOTE — PROGRESS NOTES
Assessment/Plan:         Diagnoses and all orders for this visit:    Encounter for gynecological examination without abnormal finding    Cervical intraepithelial neoplasia (KASIA)      Atrophic ofosvejib-[id number]-oqgfbz    Subjective:      Patient ID: Sangeeta Urrutia is a 48 y.o. female.    HPI patient presents the office for annual examination.  She has a history of KASIA-3.  Status post LEEP cone biopsy in 2022.  Her Pap smear in 2023 was normal.  She denies any vaginal irritation burning.  She is having as slight discharge.  Also had a pinkish discharge following coitus.  Her last menstrual period was approximately 25 years ago following an endometrial ablation.  She is experiencing vasomotor symptoms.  Denies any dyspareunia.  Denies any dysuria, hematuria urgency or urinary incontinence.  No GI complaints.  She is status post appendectomy with partial cecectomy in 2022    Colonoscopy 2022-repeat 10 years    The following portions of the patient's history were reviewed and updated as appropriate: She  has a past medical history of Abnormal Pap smear of cervix (2022), Anxiety, Breast mass (Age 38), Depression, Endometriosis (), Exercise involving walking, Fatty liver, Fibroid (2022), GERD (gastroesophageal reflux disease), Heart murmur, Hiatal hernia, History of COVID-19 (2020), History of pneumonia, History of tobacco abuse (2021), History of UTI, History of , Motion sickness, Ovarian cyst (), PONV (postoperative nausea and vomiting), Psychiatric disorder, Syndactyly of fingers of left hand, and Wears glasses.  She   Patient Active Problem List    Diagnosis Date Noted    Seasonal allergies 2023    High grade squamous intraepithelial lesion (HGSIL) on cytologic smear of cervix 2022    Cervical high risk HPV (human papillomavirus) test positive 2022    PCB (post coital bleeding) 2022    Mucocele of appendix     History of Michael-en-Y gastric  bypass 2022    Ground glass opacity present on imaging of lung 10/13/2021    Anxiety 2021    History of syndactyly 2021    Epidermal inclusion cyst 2021    Recurrent major depressive disorder (HCC) 2021    Hyperlipidemia 2021    Mediastinal lymphadenopathy 2021     She  has a past surgical history that includes Ovarian cyst drainage; Wrist surgery; Endometrial ablation (); Implant; Breast biopsy (Right, ); Hand surgery (Left); Ganglion cyst excision;  section; EGD; REMOVAL OF INTRAUTERINE DEVICE (IUD); pr laps gstr rstcv px w/byp michael-en-y limb <150 cm (N/A, 2022); pr laps abd prtm&omentum dx w/wo spec br/wa spx (N/A, 2022); pr laparoscopic appendectomy (N/A, 2022); pr laps colectomy prtl w/rmvl terminal ileum (N/A, 2022); pr colposcopy cervix vag loop eltrd bx cervix (N/A, 2022); Appendectomy; and Bariatric Surgery (Michael En Y).  Her family history includes Cancer in her father and mother; Heart disease in her maternal grandmother and paternal grandfather; Lung cancer (age of onset: 54) in her father; Lung cancer (age of onset: 59) in her mother; Lung disease in her father and mother; No Known Problems in her daughter, daughter, paternal grandmother, and son; Prostate cancer (age of onset: 90) in her maternal grandfather; Venous thrombosis in her mother.  She  reports that she quit smoking about 17 years ago. Her smoking use included cigarettes. She started smoking about 32 years ago. She has a 15 pack-year smoking history. She has never used smokeless tobacco. She reports that she does not currently use alcohol. She reports that she does not use drugs.  Current Outpatient Medications   Medication Sig Dispense Refill    acetaminophen (TYLENOL) 325 mg tablet Take 2 tablets (650 mg total) by mouth every 6 (six) hours as needed for mild pain or moderate pain 60 tablet 0    acyclovir (ZOVIRAX) 5 % cream Apply topically every 8  (eight) hours as needed (lesions) 5 g 3    CALCIUM CITRATE PO Take by mouth 3 chews per day      escitalopram (LEXAPRO) 20 mg tablet Take 1 tablet (20 mg total) by mouth daily 90 tablet 1    FIBER ADULT GUMMIES PO Take by mouth      LORazepam (ATIVAN) 0.5 mg tablet Take 2 tablets (1 mg total) by mouth daily at bedtime as needed for anxiety 60 tablet 0    montelukast (SINGULAIR) 10 mg tablet Take 1 tablet (10 mg total) by mouth daily at bedtime 90 tablet 1    multivitamin (THERAGRAN) TABS Take 1 tablet by mouth daily Alive       Probiotic Product (PRO-BIOTIC BLEND PO) Take by mouth in the morning      simethicone (MYLICON) 125 MG chewable tablet Chew 125 mg if needed for flatulence chews      traZODone (DESYREL) 50 mg tablet Take 1 tablet (50 mg total) by mouth daily at bedtime 90 tablet 1    valACYclovir (VALTREX) 500 mg tablet Take 1 tablet (500 mg total) by mouth daily 90 tablet 1     No current facility-administered medications for this visit.     Current Outpatient Medications on File Prior to Visit   Medication Sig    acetaminophen (TYLENOL) 325 mg tablet Take 2 tablets (650 mg total) by mouth every 6 (six) hours as needed for mild pain or moderate pain    acyclovir (ZOVIRAX) 5 % cream Apply topically every 8 (eight) hours as needed (lesions)    CALCIUM CITRATE PO Take by mouth 3 chews per day    escitalopram (LEXAPRO) 20 mg tablet Take 1 tablet (20 mg total) by mouth daily    FIBER ADULT GUMMIES PO Take by mouth    LORazepam (ATIVAN) 0.5 mg tablet Take 2 tablets (1 mg total) by mouth daily at bedtime as needed for anxiety    montelukast (SINGULAIR) 10 mg tablet Take 1 tablet (10 mg total) by mouth daily at bedtime    multivitamin (THERAGRAN) TABS Take 1 tablet by mouth daily Alive     Probiotic Product (PRO-BIOTIC BLEND PO) Take by mouth in the morning    simethicone (MYLICON) 125 MG chewable tablet Chew 125 mg if needed for flatulence chews    traZODone (DESYREL) 50 mg tablet Take 1 tablet (50 mg total) by  "mouth daily at bedtime    valACYclovir (VALTREX) 500 mg tablet Take 1 tablet (500 mg total) by mouth daily    [DISCONTINUED] ibuprofen (MOTRIN) 200 mg tablet Take 3 tablets (600 mg total) by mouth every 6 (six) hours as needed for mild pain     No current facility-administered medications on file prior to visit.     She is allergic to duricef [cefadroxil], penicillins, dayquil [pseudoephedrine-dm-gg-apap], and medical tape..    Review of Systems      Objective:      /62   Pulse 85   Ht 5' 3\" (1.6 m)   BMI 35.07 kg/m²          Physical Exam  Vitals reviewed.   Cardiovascular:      Rate and Rhythm: Normal rate and regular rhythm.      Pulses: Normal pulses.      Heart sounds: Normal heart sounds.   Pulmonary:      Effort: Pulmonary effort is normal.      Breath sounds: Normal breath sounds.   Abdominal:      General: There is no distension.      Palpations: Abdomen is soft. There is no mass.      Tenderness: There is no abdominal tenderness. There is no guarding or rebound.      Hernia: No hernia is present. There is no hernia in the left inguinal area or right inguinal area.   Genitourinary:     General: Normal vulva.      Labia:         Right: No rash, tenderness or lesion.         Left: No rash, tenderness or lesion.       Vagina: Normal.      Cervix: Normal.      Uterus: Normal.       Adnexa:         Right: No mass, tenderness or fullness.          Left: No mass, tenderness or fullness.        Comments: Atrophic changes  Musculoskeletal:      Cervical back: Normal range of motion and neck supple. No tenderness.   Lymphadenopathy:      Cervical: No cervical adenopathy.      Lower Body: No right inguinal adenopathy. No left inguinal adenopathy.   Neurological:      Mental Status: She is alert.           "

## 2024-04-04 LAB
LAB AP GYN PRIMARY INTERPRETATION: NORMAL
Lab: NORMAL

## 2024-05-07 DIAGNOSIS — Z86.19 HISTORY OF COLD SORES: ICD-10-CM

## 2024-05-07 DIAGNOSIS — F33.9 EPISODE OF RECURRENT MAJOR DEPRESSIVE DISORDER, UNSPECIFIED DEPRESSION EPISODE SEVERITY (HCC): ICD-10-CM

## 2024-05-07 DIAGNOSIS — F41.9 ANXIETY: ICD-10-CM

## 2024-05-07 RX ORDER — TRAZODONE HYDROCHLORIDE 50 MG/1
50 TABLET ORAL
Qty: 90 TABLET | Refills: 1 | Status: SHIPPED | OUTPATIENT
Start: 2024-05-07

## 2024-05-07 RX ORDER — VALACYCLOVIR HYDROCHLORIDE 500 MG/1
500 TABLET, FILM COATED ORAL DAILY
Qty: 90 TABLET | Refills: 1 | Status: SHIPPED | OUTPATIENT
Start: 2024-05-07 | End: 2024-11-03

## 2024-05-07 RX ORDER — ESCITALOPRAM OXALATE 20 MG/1
20 TABLET ORAL DAILY
Qty: 90 TABLET | Refills: 1 | Status: SHIPPED | OUTPATIENT
Start: 2024-05-07 | End: 2024-11-03

## 2024-06-03 ENCOUNTER — TELEPHONE (OUTPATIENT)
Dept: BARIATRICS | Facility: CLINIC | Age: 49
End: 2024-06-03

## 2024-06-03 NOTE — TELEPHONE ENCOUNTER
----- Message from Triny BLACKMON sent at 6/3/2024  7:36 AM EDT -----  Regardin year f/u appointment  Please contact patient to schedule a 2 year follow up appointment and document the results of the call in EPIC.  Thank You

## 2024-09-18 DIAGNOSIS — Z86.19 HISTORY OF COLD SORES: ICD-10-CM

## 2024-09-18 RX ORDER — VALACYCLOVIR HYDROCHLORIDE 500 MG/1
500 TABLET, FILM COATED ORAL DAILY
Qty: 90 TABLET | Refills: 1 | OUTPATIENT
Start: 2024-09-18 | End: 2025-03-17

## 2024-09-18 NOTE — TELEPHONE ENCOUNTER
"Pt's dermatologist recommended to take 2 pills when she feels a breakout coming - she would like to know if the directions can be changed to \"take one or two pills daily\". She has 7 pills left.   "

## 2024-09-20 ENCOUNTER — TELEPHONE (OUTPATIENT)
Age: 49
End: 2024-09-20

## 2024-09-20 NOTE — TELEPHONE ENCOUNTER
Patient called stating that there are no refills on file for Valacylovir at pharmacy.  Patient stated she is starting to experience another outbreak.  Patient also stated her dermatologist recommended she take two pills when experiencing an outbreak.  Patient would like to know if RX can be changed to 2 pills and called into pharmacy.  Please advise.

## 2024-09-20 NOTE — TELEPHONE ENCOUNTER
Pt called and just spoke w/ Anca regarding the refill request. She received a message getting a denial, message was not routed to correct pod. Pt has been taking 2 tablets as recommended by her dermatologist for the cold sore outbreaks and would like the dosage to be changed so it can be filled accordingly and not run out of the medication sooner. Please advise ASAP, pt would like to have it before the weekend.

## 2024-09-23 ENCOUNTER — TELEPHONE (OUTPATIENT)
Age: 49
End: 2024-09-23

## 2024-09-23 RX ORDER — VALACYCLOVIR HYDROCHLORIDE 500 MG/1
TABLET, FILM COATED ORAL
Qty: 60 TABLET | Refills: 0 | Status: SHIPPED | OUTPATIENT
Start: 2024-09-23 | End: 2024-10-23

## 2024-09-23 NOTE — TELEPHONE ENCOUNTER
"\"patient stated the script needs to read for 1-2 tablets daily if needed\"    Please advise, Could not find any documentation of this in chart.     "

## 2024-09-23 NOTE — TELEPHONE ENCOUNTER
Patient was told she has a refill at the pharmacy and that has been verified to the patient that she does not have a refill- patient stated she does not take the medication once daily-patient stated the script needs to read for 1-2 tablets daily if needed. Patient is out of medication x 1 week and is now developing a sore and is concerned that a new script has not be sent as patient has made several attempts to have the office clarify medication and to resend the script as appropriate to [facility]     Patient is requesting for call back to discuss and inform patient when new script has been sent to pharmacy

## 2024-09-24 NOTE — TELEPHONE ENCOUNTER
Pt called to check on refill, informed pt that it was receipt confirmed by pharmacy on 9/23/24 at 5:31pm. Pt will call pharmacy to make sure it is there for  and if there are any issues she will call back. Thank you

## 2024-10-01 ENCOUNTER — OFFICE VISIT (OUTPATIENT)
Age: 49
End: 2024-10-01
Payer: COMMERCIAL

## 2024-10-01 VITALS
DIASTOLIC BLOOD PRESSURE: 70 MMHG | OXYGEN SATURATION: 96 % | TEMPERATURE: 98.6 F | SYSTOLIC BLOOD PRESSURE: 118 MMHG | HEIGHT: 63 IN | BODY MASS INDEX: 34.91 KG/M2 | WEIGHT: 197 LBS | HEART RATE: 81 BPM

## 2024-10-01 DIAGNOSIS — Z12.31 ENCOUNTER FOR SCREENING MAMMOGRAM FOR BREAST CANCER: ICD-10-CM

## 2024-10-01 DIAGNOSIS — F41.9 ANXIETY: ICD-10-CM

## 2024-10-01 DIAGNOSIS — Z98.84 HISTORY OF ROUX-EN-Y GASTRIC BYPASS: Primary | ICD-10-CM

## 2024-10-01 DIAGNOSIS — J30.2 SEASONAL ALLERGIES: ICD-10-CM

## 2024-10-01 DIAGNOSIS — Z86.19 HISTORY OF COLD SORES: ICD-10-CM

## 2024-10-01 DIAGNOSIS — F33.9 EPISODE OF RECURRENT MAJOR DEPRESSIVE DISORDER, UNSPECIFIED DEPRESSION EPISODE SEVERITY (HCC): ICD-10-CM

## 2024-10-01 PROCEDURE — 99214 OFFICE O/P EST MOD 30 MIN: CPT | Performed by: INTERNAL MEDICINE

## 2024-10-01 RX ORDER — MONTELUKAST SODIUM 10 MG/1
10 TABLET ORAL
Qty: 90 TABLET | Refills: 1 | Status: SHIPPED | OUTPATIENT
Start: 2024-10-01

## 2024-10-01 RX ORDER — LORAZEPAM 0.5 MG/1
1 TABLET ORAL
Qty: 60 TABLET | Refills: 0 | Status: SHIPPED | OUTPATIENT
Start: 2024-10-01

## 2024-10-01 RX ORDER — TRAZODONE HYDROCHLORIDE 50 MG/1
50 TABLET, FILM COATED ORAL
Qty: 90 TABLET | Refills: 1 | Status: SHIPPED | OUTPATIENT
Start: 2024-10-01

## 2024-10-01 RX ORDER — ESCITALOPRAM OXALATE 20 MG/1
20 TABLET ORAL DAILY
Qty: 90 TABLET | Refills: 1 | Status: SHIPPED | OUTPATIENT
Start: 2024-10-01 | End: 2025-03-30

## 2024-10-01 RX ORDER — VALACYCLOVIR HYDROCHLORIDE 500 MG/1
TABLET, FILM COATED ORAL
Qty: 180 TABLET | Refills: 1 | Status: SHIPPED | OUTPATIENT
Start: 2024-10-01 | End: 2024-10-31

## 2024-10-01 NOTE — ASSESSMENT & PLAN NOTE
Bariatric labs are all within normal limits.  Repeat in 6 months.    Orders:    CBC; Future    Iron; Future    Vitamin D 25 hydroxy; Future    Vitamin B12; Future    Ferritin; Future    Vitamin B1, whole blood; Future    PTH, intact; Future    Vitamin A; Future    Folate; Future    CBC    Iron    Vitamin D 25 hydroxy    Vitamin B12    Ferritin    Vitamin B1, whole blood    PTH, intact    Vitamin A    Folate

## 2024-10-01 NOTE — ASSESSMENT & PLAN NOTE
Continue Singulair and symptomatic treatment as needed    Orders:    montelukast (SINGULAIR) 10 mg tablet; Take 1 tablet (10 mg total) by mouth daily at bedtime

## 2024-10-01 NOTE — ASSESSMENT & PLAN NOTE
Zovirax cream as needed, Valtrex 1000 mg daily for outbreaks    Orders:    valACYclovir (VALTREX) 500 mg tablet; Take 2 tablets daily as needed

## 2024-10-01 NOTE — PROGRESS NOTES
Ambulatory Visit  Name: Sangeeta Urrutia      : 1975      MRN: [id number]  Encounter Provider: Alex Suresh MD  Encounter Date: 10/1/2024   Encounter department: [facility]    Assessment & Plan  Encounter for screening mammogram for breast cancer  Mammogram ordered    Orders:    Mammo screening bilateral w 3d and cad; Future    History of cold sores  Zovirax cream as needed, Valtrex 1000 mg daily for outbreaks    Orders:    valACYclovir (VALTREX) 500 mg tablet; Take 2 tablets daily as needed    Episode of recurrent major depressive disorder, unspecified depression episode severity (HCC)  Depression Screening Follow-up Plan: Patient's depression screening was positive with a PHQ-9 score of 5. Patient with underlying depression and was advised to continue current medications as prescribed.    Orders:    traZODone (DESYREL) 50 mg tablet; Take 1 tablet (50 mg total) by mouth daily at bedtime    Seasonal allergies  Continue Singulair and symptomatic treatment as needed    Orders:    montelukast (SINGULAIR) 10 mg tablet; Take 1 tablet (10 mg total) by mouth daily at bedtime    Anxiety    Orders:    LORazepam (ATIVAN) 0.5 mg tablet; Take 2 tablets (1 mg total) by mouth daily at bedtime as needed for anxiety    escitalopram (LEXAPRO) 20 mg tablet; Take 1 tablet (20 mg total) by mouth daily    History of Michael-en-Y gastric bypass  Bariatric labs are all within normal limits.  Repeat in 6 months.    Orders:    CBC; Future    Iron; Future    Vitamin D 25 hydroxy; Future    Vitamin B12; Future    Ferritin; Future    Vitamin B1, whole blood; Future    PTH, intact; Future    Vitamin A; Future    Folate; Future    CBC    Iron    Vitamin D 25 hydroxy    Vitamin B12    Ferritin    Vitamin B1, whole blood    PTH, intact    Vitamin A    Folate       History of Present Illness     Patient presents for follow-up visit for anxiety depression, she is status post Michael-en-Y bypass and  "recently completed bariatric panel which was reviewed.  All labs are well within normal limits.  She has no anemia iron levels are normal.  She complains of some intermittent difficulties with sleeping.  She has been experiencing some right calf pain for the last 2 days.  She has been applying Biofreeze with some improvement.  She has some minor skin lesions on her left wrist and her right arm that she has some concerns about          Review of Systems   Constitutional: Negative.    HENT: Negative.     Eyes: Negative.    Respiratory: Negative.     Cardiovascular: Negative.    Gastrointestinal: Negative.    Endocrine: Negative.    Genitourinary: Negative.    Musculoskeletal:  Positive for myalgias (Right calf pain).   Skin:         2 small benign-appearing lesions on her skin 1 on the lateral right forearm just below the elbow, the other on her left wrist where she would wear her wrist watch.   Allergic/Immunologic: Negative.    Neurological: Negative.    Hematological: Negative.    Psychiatric/Behavioral:  Positive for sleep disturbance.            Objective     /70 (BP Location: Left arm, Patient Position: Sitting, Cuff Size: Large)   Pulse 81   Temp 98.6 °F (37 °C) (Temporal)   Ht 5' 3\" (1.6 m)   Wt 89.4 kg (197 lb)   SpO2 96%   BMI 34.90 kg/m²     Physical Exam  Vitals reviewed.   Constitutional:       General: She is not in acute distress.     Appearance: Normal appearance. She is normal weight. She is not ill-appearing, toxic-appearing or diaphoretic.   HENT:      Head: Normocephalic and atraumatic.      Right Ear: External ear normal.      Left Ear: External ear normal.      Nose: Nose normal.      Mouth/Throat:      Mouth: Mucous membranes are moist.   Eyes:      General: No scleral icterus.     Conjunctiva/sclera: Conjunctivae normal.      Pupils: Pupils are equal, round, and reactive to light.   Cardiovascular:      Rate and Rhythm: Normal rate and regular rhythm.      Heart sounds: Normal heart " sounds. No murmur heard.  Pulmonary:      Effort: Pulmonary effort is normal. No respiratory distress.      Breath sounds: Normal breath sounds.   Abdominal:      General: Abdomen is flat. There is no distension.   Musculoskeletal:         General: Tenderness (Minimal tenderness of the right calf.) present. No swelling or deformity.      Cervical back: Neck supple.      Right lower leg: No edema.      Left lower leg: No edema.   Lymphadenopathy:      Cervical: No cervical adenopathy.   Skin:     General: Skin is warm and dry.      Coloration: Skin is not jaundiced.      Findings: No bruising, erythema or rash.   Neurological:      General: No focal deficit present.      Mental Status: She is alert and oriented to person, place, and time. Mental status is at baseline.   Psychiatric:         Mood and Affect: Mood normal.         Behavior: Behavior normal.         Thought Content: Thought content normal.         Judgment: Judgment normal.

## 2024-10-01 NOTE — ASSESSMENT & PLAN NOTE
Orders:    LORazepam (ATIVAN) 0.5 mg tablet; Take 2 tablets (1 mg total) by mouth daily at bedtime as needed for anxiety    escitalopram (LEXAPRO) 20 mg tablet; Take 1 tablet (20 mg total) by mouth daily

## 2024-10-01 NOTE — ASSESSMENT & PLAN NOTE
Depression Screening Follow-up Plan: Patient's depression screening was positive with a PHQ-9 score of 5. Patient with underlying depression and was advised to continue current medications as prescribed.    Orders:    traZODone (DESYREL) 50 mg tablet; Take 1 tablet (50 mg total) by mouth daily at bedtime

## 2024-10-02 ENCOUNTER — TELEPHONE (OUTPATIENT)
Dept: ADMINISTRATIVE | Facility: OTHER | Age: 49
End: 2024-10-02

## 2024-10-02 NOTE — TELEPHONE ENCOUNTER
----- Message from Rosa BLACKMON sent at 10/1/2024  6:32 PM EDT -----  Regarding: Shingrix vaccine - nvpc  10/01/24 6:32 PM    Hello, our patient Sangeeta Urrutia has had Immunization(s) completed/performed. Please assist in updating the patient chart by making an External outreach to [facility] facility located in [place], [place]. The date of service is 2024.    Thank you,  Rosa Jang MA  [facility] [place]

## 2024-10-02 NOTE — TELEPHONE ENCOUNTER
Upon review of the In Basket request and the patient's chart, initial outreach has been made via fax to facility. Please see Contacts section for details.     Thank you  Lindsay Rosa MA

## 2024-10-02 NOTE — LETTER
Vaccination Request Form: Zoster      Date Requested: 10/02/24  Patient: Sangeeta Urrutia  Patient : 1975   Referring Provider: Alex Suresh MD       The above patient has informed us that they have had their   most recent Zoster administered at your facility. Please   complete this form and attach all corresponding documentation.    Date of Vaccine(s) Given  ______________________________    Lot Number(s) _______________________________________    Manufacture(s) ______________________________________    Dose Amount (s) _____________________________________    Expiration Date(s) ____________________________________    Comments __________________________________________________________  ____________________________________________________________________  ____________________________________________________________________  ____________________________________________________________________    Administering Facility  ________________________________________________    Vaccine Administered By (print name) ___________________________________      Form Completed By (print name) _______________________________________      Signature ___________________________________________________________      These reports are needed for  compliance.    Please fax this completed form and a copy of the Vaccine Document(s) to our office located at [facility] [place] [address] as soon as possible to Fax [phone] rosa maria Montoya: Phone [phone]    We thank you for your assistance in treating our mutual patient.

## 2024-10-07 LAB
25(OH)D3+25(OH)D2 SERPL-MCNC: 33.6 NG/ML (ref 30–100)
BASOPHILS # BLD AUTO: 0.1 X10E3/UL (ref 0–0.2)
BASOPHILS NFR BLD AUTO: 1 %
EOSINOPHIL # BLD AUTO: 0 X10E3/UL (ref 0–0.4)
EOSINOPHIL NFR BLD AUTO: 1 %
ERYTHROCYTE [DISTWIDTH] IN BLOOD BY AUTOMATED COUNT: 12.5 % (ref 11.7–15.4)
FERRITIN SERPL-MCNC: 65 NG/ML (ref 15–150)
FOLATE SERPL-MCNC: 10.2 NG/ML
HCT VFR BLD AUTO: 44.9 % (ref 34–46.6)
HGB BLD-MCNC: 15 G/DL (ref 11.1–15.9)
IMM GRANULOCYTES # BLD: 0 X10E3/UL (ref 0–0.1)
IMM GRANULOCYTES NFR BLD: 0 %
IRON SERPL-MCNC: 159 UG/DL (ref 27–159)
LYMPHOCYTES # BLD AUTO: 1.9 X10E3/UL (ref 0.7–3.1)
LYMPHOCYTES NFR BLD AUTO: 26 %
MCH RBC QN AUTO: 31.7 PG (ref 26.6–33)
MCHC RBC AUTO-ENTMCNC: 33.4 G/DL (ref 31.5–35.7)
MCV RBC AUTO: 95 FL (ref 79–97)
MONOCYTES # BLD AUTO: 0.5 X10E3/UL (ref 0.1–0.9)
MONOCYTES NFR BLD AUTO: 6 %
NEUTROPHILS # BLD AUTO: 5 X10E3/UL (ref 1.4–7)
NEUTROPHILS NFR BLD AUTO: 66 %
PLATELET # BLD AUTO: 271 X10E3/UL (ref 150–450)
PTH-INTACT SERPL-MCNC: 53 PG/ML (ref 15–65)
RBC # BLD AUTO: 4.73 X10E6/UL (ref 3.77–5.28)
VIT A SERPL-MCNC: 52.3 UG/DL (ref 20.1–62)
VIT B1 BLD-SCNC: 160.4 NMOL/L (ref 66.5–200)
VIT B12 SERPL-MCNC: 509 PG/ML (ref 232–1245)
WBC # BLD AUTO: 7.4 X10E3/UL (ref 3.4–10.8)

## 2024-10-18 NOTE — TELEPHONE ENCOUNTER
Upon review of the In Basket request we have found as a result of outreach that patient did not have the requested item(s) completed.     Any additional questions or concerns should be emailed to the Practice Liaisons via the appropriate education email address, please do not reply via In Basket.    Thank you  Lindsay Rosa MA   [facility]

## 2025-03-26 DIAGNOSIS — F41.9 ANXIETY: ICD-10-CM

## 2025-03-26 RX ORDER — LORAZEPAM 0.5 MG/1
1 TABLET ORAL
Qty: 60 TABLET | Refills: 0 | Status: SHIPPED | OUTPATIENT
Start: 2025-03-26

## 2025-03-26 NOTE — TELEPHONE ENCOUNTER
Medication: LORazepam (ATIVAN) 0.5 mg tablet     Dose/Frequency: Take 2 tablets (1 mg total) by mouth daily at bedtime as needed for anxiety     Quantity: 60    Pharmacy: [facility]    Office:   [x] PCP/Provider -   [] Speciality/Provider -     Does the patient have enough for 3 days?   [] Yes   [x] No - Send as HP to POD

## 2025-04-10 ENCOUNTER — HOSPITAL ENCOUNTER (OUTPATIENT)
Age: 50
Discharge: HOME | End: 2025-04-10
Payer: COMMERCIAL

## 2025-04-10 VITALS — BODY MASS INDEX: 34.91 KG/M2 | WEIGHT: 197 LBS | HEIGHT: 63 IN

## 2025-04-10 DIAGNOSIS — Z12.31 ENCOUNTER FOR SCREENING MAMMOGRAM FOR BREAST CANCER: ICD-10-CM

## 2025-04-10 PROCEDURE — 77067 SCR MAMMO BI INCL CAD: CPT

## 2025-04-10 PROCEDURE — 77063 BREAST TOMOSYNTHESIS BI: CPT

## 2025-04-18 ENCOUNTER — OFFICE VISIT (OUTPATIENT)
Age: 50
End: 2025-04-18
Payer: COMMERCIAL

## 2025-04-18 VITALS
DIASTOLIC BLOOD PRESSURE: 86 MMHG | TEMPERATURE: 98.2 F | OXYGEN SATURATION: 97 % | HEART RATE: 86 BPM | SYSTOLIC BLOOD PRESSURE: 136 MMHG

## 2025-04-18 DIAGNOSIS — H65.91 RIGHT NON-SUPPURATIVE OTITIS MEDIA: ICD-10-CM

## 2025-04-18 DIAGNOSIS — B00.89 RECURRENT ORAL HERPES SIMPLEX INFECTION: ICD-10-CM

## 2025-04-18 DIAGNOSIS — Z86.19 HISTORY OF COLD SORES: ICD-10-CM

## 2025-04-18 DIAGNOSIS — F41.9 ANXIETY: ICD-10-CM

## 2025-04-18 DIAGNOSIS — F33.9 EPISODE OF RECURRENT MAJOR DEPRESSIVE DISORDER, UNSPECIFIED DEPRESSION EPISODE SEVERITY (HCC): ICD-10-CM

## 2025-04-18 DIAGNOSIS — J30.2 SEASONAL ALLERGIES: ICD-10-CM

## 2025-04-18 DIAGNOSIS — Z98.84 HISTORY OF ROUX-EN-Y GASTRIC BYPASS: Primary | ICD-10-CM

## 2025-04-18 PROCEDURE — 99213 OFFICE O/P EST LOW 20 MIN: CPT | Performed by: INTERNAL MEDICINE

## 2025-04-18 RX ORDER — LORAZEPAM 0.5 MG/1
1 TABLET ORAL
Qty: 60 TABLET | Refills: 0 | Status: SHIPPED | OUTPATIENT
Start: 2025-04-18

## 2025-04-18 RX ORDER — ACYCLOVIR 50 MG/G
CREAM TOPICAL EVERY 8 HOURS PRN
Qty: 5 G | Refills: 3 | Status: SHIPPED | OUTPATIENT
Start: 2025-04-18

## 2025-04-18 RX ORDER — TRAZODONE HYDROCHLORIDE 50 MG/1
50 TABLET ORAL
Qty: 90 TABLET | Refills: 1 | Status: SHIPPED | OUTPATIENT
Start: 2025-04-18

## 2025-04-18 RX ORDER — LORATADINE 10 MG/1
10 TABLET ORAL DAILY
COMMUNITY

## 2025-04-18 RX ORDER — ESCITALOPRAM OXALATE 20 MG/1
20 TABLET ORAL DAILY
Qty: 100 TABLET | Refills: 1 | Status: SHIPPED | OUTPATIENT
Start: 2025-04-18

## 2025-04-18 RX ORDER — MONTELUKAST SODIUM 10 MG/1
10 TABLET ORAL
Qty: 100 TABLET | Refills: 1 | Status: SHIPPED | OUTPATIENT
Start: 2025-04-18

## 2025-04-18 RX ORDER — VALACYCLOVIR HYDROCHLORIDE 500 MG/1
TABLET, FILM COATED ORAL
Qty: 180 TABLET | Refills: 1 | Status: SHIPPED | OUTPATIENT
Start: 2025-04-18 | End: 2025-05-18

## 2025-04-18 RX ORDER — AZITHROMYCIN 250 MG/1
TABLET, FILM COATED ORAL
Qty: 6 TABLET | Refills: 0 | Status: SHIPPED | OUTPATIENT
Start: 2025-04-18 | End: 2025-04-23

## 2025-04-18 NOTE — ASSESSMENT & PLAN NOTE
Reasonably well-controlled with intermittent use of lorazepam and continuous use of escitalopram  Orders:    LORazepam (ATIVAN) 0.5 mg tablet; Take 2 tablets (1 mg total) by mouth daily at bedtime as needed for anxiety    escitalopram (LEXAPRO) 20 mg tablet; Take 1 tablet (20 mg total) by mouth daily

## 2025-04-18 NOTE — ASSESSMENT & PLAN NOTE
NICOLAS-Víctor as directed  Orders:    azithromycin (Zithromax) 250 mg tablet; Take 2 tablets (500 mg total) by mouth daily for 1 day, THEN 1 tablet (250 mg total) daily for 4 days.

## 2025-04-18 NOTE — ASSESSMENT & PLAN NOTE
Take Lavere as needed for outbreaks  Orders:    valACYclovir (VALTREX) 500 mg tablet; Take 2 tablets daily as needed

## 2025-04-18 NOTE — PROGRESS NOTES
Name: Sangeeta Urrutia      : 1975      MRN: [id number]  Encounter Provider: Alex Suresh MD  Encounter Date: 2025   Encounter department: [facility]  :  Assessment & Plan  Anxiety  Reasonably well-controlled with intermittent use of lorazepam and continuous use of escitalopram  Orders:    LORazepam (ATIVAN) 0.5 mg tablet; Take 2 tablets (1 mg total) by mouth daily at bedtime as needed for anxiety    escitalopram (LEXAPRO) 20 mg tablet; Take 1 tablet (20 mg total) by mouth daily    Episode of recurrent major depressive disorder, unspecified depression episode severity (HCC)  Depression Screening Follow-up Plan: Patient's depression screening was positive with a PHQ-9 score of 8. Continue regular follow-up with their psychologist/therapist/psychiatrist who is managing their mental health condition(s).    Orders:    traZODone (DESYREL) 50 mg tablet; Take 1 tablet (50 mg total) by mouth daily at bedtime    History of cold sores  Take Lavere as needed for outbreaks  Orders:    valACYclovir (VALTREX) 500 mg tablet; Take 2 tablets daily as needed    Recurrent oral herpes simplex infection  Acyclovir as needed for outbreaks  Orders:    acyclovir (ZOVIRAX) 5 % cream; Apply topically every 8 (eight) hours as needed (lesions)    Seasonal allergies  Stable with symptomatic and preventive treatment  Orders:    montelukast (SINGULAIR) 10 mg tablet; Take 1 tablet (10 mg total) by mouth daily at bedtime    History of Michael-en-Y gastric bypass  Most recent bariatric labs were normal she has not had her labs done prior to this visit.       Right non-suppurative otitis media  Z-Víctor as directed  Orders:    azithromycin (Zithromax) 250 mg tablet; Take 2 tablets (500 mg total) by mouth daily for 1 day, THEN 1 tablet (250 mg total) daily for 4 days.           History of Present Illness   Patient presents to the office for follow-up visit.  Overall she is doing well. She did not  go for any lab studies.  She has a history of Michael-en-Y gastric bypass.  Previous bariatric follow-up within normal limits.  Taking some gastric bypass supplements that have helped maintain stability in her levels.  He has no major complaints at this time.  She is currently under treatment for anxiety and depression and feels that she is doing well.  She has been experiencing some pain in her right ear along with some sinus congestion and stuffiness in her right maxillary sinus.  She denies any headaches or fever.  Denies sore throat      Review of Systems   Constitutional: Negative.    HENT:  Positive for ear pain, sinus pressure and sinus pain.    Eyes: Negative.    Respiratory: Negative.     Cardiovascular: Negative.    Gastrointestinal: Negative.    Endocrine: Negative.    Genitourinary: Negative.    Musculoskeletal: Negative.    Skin: Negative.    Hematological: Negative.    Psychiatric/Behavioral:  Positive for dysphoric mood and sleep disturbance. The patient is nervous/anxious.        Objective   /86 (BP Location: Left arm, Patient Position: Sitting, Cuff Size: Large)   Pulse 86   Temp 98.2 °F (36.8 °C) (Temporal)   LMP  (LMP Unknown)   SpO2 97%      Physical Exam  Constitutional:       General: She is not in acute distress.     Appearance: Normal appearance. She is obese. She is not ill-appearing, toxic-appearing or diaphoretic.   HENT:      Head: Normocephalic and atraumatic.      Right Ear: Ear canal and external ear normal. There is no impacted cerumen.      Left Ear: Tympanic membrane, ear canal and external ear normal. There is no impacted cerumen.      Ears:      Comments: Hyperemic right tympanic membrane     Nose: Nose normal. No congestion or rhinorrhea.      Mouth/Throat:      Mouth: Mucous membranes are dry.      Pharynx: Oropharynx is clear.   Eyes:      General: No scleral icterus.     Extraocular Movements: Extraocular movements intact.      Conjunctiva/sclera: Conjunctivae normal.       Pupils: Pupils are equal, round, and reactive to light.   Cardiovascular:      Rate and Rhythm: Normal rate and regular rhythm.      Heart sounds: Normal heart sounds.   Pulmonary:      Effort: Pulmonary effort is normal. No respiratory distress.      Breath sounds: Normal breath sounds.   Abdominal:      General: Abdomen is flat. There is no distension.   Musculoskeletal:      Cervical back: Neck supple.      Right lower leg: No edema.      Left lower leg: No edema.   Skin:     General: Skin is warm.      Coloration: Skin is not jaundiced.      Findings: No bruising, erythema or rash.   Neurological:      General: No focal deficit present.      Mental Status: She is alert and oriented to person, place, and time. Mental status is at baseline.   Psychiatric:         Mood and Affect: Mood normal.         Behavior: Behavior normal.

## 2025-04-18 NOTE — ASSESSMENT & PLAN NOTE
Depression Screening Follow-up Plan: Patient's depression screening was positive with a PHQ-9 score of 8. Continue regular follow-up with their psychologist/therapist/psychiatrist who is managing their mental health condition(s).    Orders:    traZODone (DESYREL) 50 mg tablet; Take 1 tablet (50 mg total) by mouth daily at bedtime

## 2025-04-18 NOTE — ASSESSMENT & PLAN NOTE
Stable with symptomatic and preventive treatment  Orders:    montelukast (SINGULAIR) 10 mg tablet; Take 1 tablet (10 mg total) by mouth daily at bedtime

## 2025-05-09 ENCOUNTER — NURSE TRIAGE (OUTPATIENT)
Age: 50
End: 2025-05-09

## 2025-05-09 ENCOUNTER — NURSE TRIAGE (OUTPATIENT)
Dept: OTHER | Facility: OTHER | Age: 50
End: 2025-05-09

## 2025-05-09 DIAGNOSIS — F33.9 EPISODE OF RECURRENT MAJOR DEPRESSIVE DISORDER, UNSPECIFIED DEPRESSION EPISODE SEVERITY (HCC): ICD-10-CM

## 2025-05-09 DIAGNOSIS — J30.2 SEASONAL ALLERGIES: ICD-10-CM

## 2025-05-09 DIAGNOSIS — F41.9 ANXIETY: ICD-10-CM

## 2025-05-09 DIAGNOSIS — Z86.19 HISTORY OF COLD SORES: ICD-10-CM

## 2025-05-09 RX ORDER — ESCITALOPRAM OXALATE 20 MG/1
20 TABLET ORAL DAILY
Qty: 90 TABLET | Refills: 1 | OUTPATIENT
Start: 2025-05-09

## 2025-05-09 RX ORDER — TRAZODONE HYDROCHLORIDE 50 MG/1
50 TABLET ORAL
Qty: 90 TABLET | Refills: 1 | Status: SHIPPED | OUTPATIENT
Start: 2025-05-09 | End: 2025-05-09 | Stop reason: SDUPTHER

## 2025-05-09 RX ORDER — MONTELUKAST SODIUM 10 MG/1
10 TABLET ORAL
Qty: 100 TABLET | Refills: 1 | Status: SHIPPED | OUTPATIENT
Start: 2025-05-09

## 2025-05-09 RX ORDER — ESCITALOPRAM OXALATE 20 MG/1
20 TABLET ORAL DAILY
Qty: 100 TABLET | Refills: 1 | Status: SHIPPED | OUTPATIENT
Start: 2025-05-09

## 2025-05-09 RX ORDER — MONTELUKAST SODIUM 10 MG/1
10 TABLET ORAL
Qty: 100 TABLET | Refills: 1 | Status: SHIPPED | OUTPATIENT
Start: 2025-05-09 | End: 2025-05-09 | Stop reason: SDUPTHER

## 2025-05-09 RX ORDER — VALACYCLOVIR HYDROCHLORIDE 500 MG/1
TABLET, FILM COATED ORAL
Qty: 180 TABLET | Refills: 1 | Status: SHIPPED | OUTPATIENT
Start: 2025-05-09 | End: 2025-05-09 | Stop reason: SDUPTHER

## 2025-05-09 RX ORDER — TRAZODONE HYDROCHLORIDE 50 MG/1
50 TABLET ORAL
Qty: 90 TABLET | Refills: 1 | OUTPATIENT
Start: 2025-05-09

## 2025-05-09 RX ORDER — MONTELUKAST SODIUM 10 MG/1
10 TABLET ORAL
Qty: 100 TABLET | Refills: 1 | OUTPATIENT
Start: 2025-05-09

## 2025-05-09 RX ORDER — ESCITALOPRAM OXALATE 20 MG/1
20 TABLET ORAL DAILY
Qty: 100 TABLET | Refills: 1 | Status: SHIPPED | OUTPATIENT
Start: 2025-05-09 | End: 2025-05-09 | Stop reason: SDUPTHER

## 2025-05-09 RX ORDER — TRAZODONE HYDROCHLORIDE 50 MG/1
50 TABLET ORAL
Qty: 90 TABLET | Refills: 1 | Status: SHIPPED | OUTPATIENT
Start: 2025-05-09

## 2025-05-09 RX ORDER — VALACYCLOVIR HYDROCHLORIDE 500 MG/1
TABLET, FILM COATED ORAL
Qty: 180 TABLET | Refills: 1 | OUTPATIENT
Start: 2025-05-09 | End: 2025-06-08

## 2025-05-09 RX ORDER — VALACYCLOVIR HYDROCHLORIDE 500 MG/1
TABLET, FILM COATED ORAL
Qty: 180 TABLET | Refills: 1 | Status: SHIPPED | OUTPATIENT
Start: 2025-05-09 | End: 2025-06-08

## 2025-05-09 NOTE — TELEPHONE ENCOUNTER
"FOLLOW UP: PCP    REASON FOR CONVERSATION: Medication Problem    SYMPTOMS: Medication not in pharmacy    OTHER: n/a    DISPOSITION: Call PCP Now  Reason for Disposition  • [1] Prescription prescribed recently is not at pharmacy AND [2] triager has access to patient's EMR AND [3] prescription is recorded in the EMR    Answer Assessment - Initial Assessment Questions  1. NAME of MEDICINE: \"What medicine(s) are you calling about?\"  Escitalopram Oxalate 20 mg Oral Daily  traZODone HCl 50 mg Oral Daily at bedtime  valACYclovir HCl Take 2 tablets daily as needed  Montelukast Sodium 10 mg Oral Daily at bedtime    2. QUESTION: \"What is your question?\" (e.g., double dose of medicine, side effect)      Pharmacy states never received ordered   3. PRESCRIBER: \"Who prescribed the medicine?\" Reason: if prescribed by specialist, call should be referred to that group.      PCP  4. SYMPTOMS: \"Do you have any symptoms?\" If Yes, ask: \"What symptoms are you having?\"  \"How bad are the symptoms (e.g., mild, moderate, severe)      Denies    Protocols used: Medication Question Call-Adult-    "

## 2025-05-09 NOTE — TELEPHONE ENCOUNTER
Regarding: med refill  ----- Message from Alexa LAWTON sent at 5/9/2025 10:45 AM EDT -----  Medication: montelukast (SINGULAIR) 10 mg tablet    Dose/Frequency: Take 1 tablet (10 mg total) by mouth daily at bedtime    Quantity: 100    Pharmacy: [address]    Office:   [x ] PCP/Provider -   [ ] Speciality/Provider -     Does the patient have enough for 3 days?   [ ] Yes   [x ] No - Send as HP to POD    Medication: traZODone (DESYREL) 50 mg tablet    Dose/Frequency: Take 1 tablet (50 mg total) by mouth daily at bedtime    Quantity: 90    Pharmacy: [address]    Office:   [x ] PCP/Provider -   [ ] Speciality/Provider -     Does the patient have enough for 3 days?   [ ] Yes   [ x] No - Send as HP to POD    Medication: escitalopram (LEXAPRO) 20 mg tablet    Dose/Frequency: Take 1 tablet (20 mg total) by mouth daily    Quantity: 100    Pharmacy: [address]    Office:   [ x] PCP/Provider -   [ ] Speciality/Provider -     Does the patient have enough for 3 days?   [ ] Yes   [ x] No - Send as HP to POD    Medication: valACYclovir (VALTREX) 500 mg tablet     Dose/Frequency: Take 2 tablets daily as needed    Quantity: 180    Pharmacy: [address]    Office:   [ x] PCP/Provider -   [ ] Speciality/Provider -     Does the patient have enough for 3 days?   [ ] Yes   [ x] No - Send as HP to POD

## 2025-05-09 NOTE — TELEPHONE ENCOUNTER
Pt states the last refill was never received by Pharmacy. She is out of all medications. Pt is requesting a call to pharmacy [facility] - [place], [place] - [address] [phone]

## 2025-05-09 NOTE — TELEPHONE ENCOUNTER
Regarding: refill request lexapro  ----- Message from Dodie CUNNINGHAM sent at 5/9/2025  6:26 PM EDT -----  Patient called in stating she needs her antidepressant Lexapro sent to the pharmacy on file. Patient tried calling earlier to get this facilitated but says she did speak with the pharmacy after 344pm (when pharmacy shows confirmed receipt in epic), but that the pharmacy did not receive any prescriptions.

## 2025-05-09 NOTE — TELEPHONE ENCOUNTER
"Reason for Disposition  • Follow-up information-only call to recent contact, no triage required    Answer Assessment - Initial Assessment Questions  1. REASON FOR CALL: \"What is the main reason for your call?\" or \"How can I best help you?\"      Received request in triage queue for refills on Singulair, Valtrex, Trazodone, and Lexapro. All of these medications were sent to patient's pharmacy on 4/18/25 with refills. Attempted to call patient x1 to inform her with no answer. Advised patient to call back.    Protocols used: Information Only Call - No Triage-Adult-[place]    "

## 2025-05-09 NOTE — TELEPHONE ENCOUNTER
Patient calling due to medication not being at pharmacy. Medication ordered today by PCP. Patient states pharmacy states they did not received medications orders.  Resent per policy. Patient informed. Verbalized understanding. No further questions.

## 2025-06-09 DIAGNOSIS — F41.9 ANXIETY: ICD-10-CM

## 2025-06-12 NOTE — TELEPHONE ENCOUNTER
Sangeeta called stating that she entered a refill request after being told from Dr Suresh said it would be no issue, requesting for the prescription to be sent over to the pharmacy as soon as possible.    Please advise.

## 2025-06-13 RX ORDER — LORAZEPAM 0.5 MG/1
1 TABLET ORAL
Qty: 60 TABLET | Refills: 0 | Status: SHIPPED | OUTPATIENT
Start: 2025-06-13

## (undated) DEVICE — BAG DECANTER

## (undated) DEVICE — TROCAR: Brand: KII FIOS FIRST ENTRY

## (undated) DEVICE — NEEDLE SPINAL18G X 3.5 IN QUINCKE

## (undated) DEVICE — GLOVE SRG BIOGEL ECLIPSE 8

## (undated) DEVICE — STAPLER EEA 25 MM COVIDIEN

## (undated) DEVICE — INTENDED FOR TISSUE SEPARATION, AND OTHER PROCEDURES THAT REQUIRE A SHARP SURGICAL BLADE TO PUNCTURE OR CUT.: Brand: BARD-PARKER SAFETY BLADES SIZE 15, STERILE

## (undated) DEVICE — STERILE 8 INCH PROCTO SWAB: Brand: CARDINAL HEALTH

## (undated) DEVICE — TUBING SMOKE EVAC W/FILTRATION DEVICE PLUMEPORT ACTIV

## (undated) DEVICE — PENCIL ELECTROSURG E-Z CLEAN -0035H

## (undated) DEVICE — INSUFFLATION TUBING PRIMFLO

## (undated) DEVICE — Device

## (undated) DEVICE — TROCAR: Brand: KII® SLEEVE

## (undated) DEVICE — [HIGH FLOW INSUFFLATOR,  DO NOT USE IF PACKAGE IS DAMAGED,  KEEP DRY,  KEEP AWAY FROM SUNLIGHT,  PROTECT FROM HEAT AND RADIOACTIVE SOURCES.]: Brand: PNEUMOSURE

## (undated) DEVICE — ELECTRODE LAP SPATULA STR E-Z CLEAN 33CM -0018

## (undated) DEVICE — VIOLET BRAIDED (POLYGLACTIN 910), SYNTHETIC ABSORBABLE SUTURE: Brand: COATED VICRYL

## (undated) DEVICE — TUBING SUCTION 5MM X 12 FT

## (undated) DEVICE — 10 MM BABCOCKS WITH RATCHET HANDLES: Brand: ENDOPATH

## (undated) DEVICE — SYRINGE 20ML LL

## (undated) DEVICE — IRRIG ENDO FLO TUBING

## (undated) DEVICE — ADHESIVE SKIN CLSR DERMABOND NX

## (undated) DEVICE — SCD SEQUENTIAL COMPRESSION COMFORT SLEEVE MEDIUM KNEE LENGTH: Brand: KENDALL SCD

## (undated) DEVICE — NEEDLE HYPO 22G X 1-1/2 IN

## (undated) DEVICE — ASTOUND STANDARD SURGICAL GOWN, XL: Brand: CONVERTORS

## (undated) DEVICE — STAPLER ENDO GIA ROTICULATOR 60-2.5

## (undated) DEVICE — SYRINGE 30ML LL

## (undated) DEVICE — TROCARS: Brand: KII® BALLOON BLUNT TIP SYSTEM

## (undated) DEVICE — TOWEL SET X-RAY

## (undated) DEVICE — PREMIUM DRY TRAY LF: Brand: MEDLINE INDUSTRIES, INC.

## (undated) DEVICE — SMOKE EVACUATION TUBING WITH 7/8 IN TO 1/4 IN REDUCER: Brand: BUFFALO FILTER

## (undated) DEVICE — GLOVE SRG BIOGEL 6.5

## (undated) DEVICE — SUT MONOCRYL 4-0 PS-2 18 IN Y496G

## (undated) DEVICE — ENDOPATH 5MM CURVED SCISSORS WITH MONOPOLAR CAUTERY: Brand: ENDOPATH

## (undated) DEVICE — LAPAROSCOPIC TROCAR SLEEVE/SINGLE USE: Brand: KII® SLEEVE

## (undated) DEVICE — DRAPE EQUIPMENT RF WAND

## (undated) DEVICE — PMI DISPOSABLE PUNCTURE CLOSURE DEVICE / SUTURE GRASPER: Brand: PMI

## (undated) DEVICE — PACK PBDS LAP CHOLE RF

## (undated) DEVICE — CHLORAPREP HI-LITE 26ML ORANGE

## (undated) DEVICE — URETERAL CATHETER ADAPTOR TIP

## (undated) DEVICE — TIBURON LAPAROSCOPIC ABDOMINAL DRAPE: Brand: CONVERTORS

## (undated) DEVICE — TROCAR VISIPORT

## (undated) DEVICE — INTENDED FOR TISSUE SEPARATION, AND OTHER PROCEDURES THAT REQUIRE A SHARP SURGICAL BLADE TO PUNCTURE OR CUT.: Brand: BARD-PARKER SAFETY BLADES SIZE 11, STERILE

## (undated) DEVICE — VIAL DECANTER

## (undated) DEVICE — SUT VICRYL 0 UR-6 27 IN J603H

## (undated) DEVICE — ENDOPATH ECHELON ENDOSCOPIC LINEAR CUTTER RELOADS, BLUE, 60MM: Brand: ECHELON ENDOPATH

## (undated) DEVICE — ENDO STITCH DEVICE 10 MM

## (undated) DEVICE — SUT SILK 2-0 SH 30 IN K833H

## (undated) DEVICE — SUT MONOCRYL 4-0 PS-2 27 IN Y426H

## (undated) DEVICE — BETHLEHEM UNIVERSAL MINOR VAG: Brand: CARDINAL HEALTH

## (undated) DEVICE — HARMONIC 1100 SHEARS, 36CM SHAFT LENGTH: Brand: HARMONIC

## (undated) DEVICE — WEBRIL 6 IN UNSTERILE

## (undated) DEVICE — 2000CC GUARDIAN II: Brand: GUARDIAN

## (undated) DEVICE — 3000CC GUARDIAN II: Brand: GUARDIAN

## (undated) DEVICE — VISUALIZATION SYSTEM: Brand: CLEARIFY

## (undated) DEVICE — PLUMEPEN PRO 10FT

## (undated) DEVICE — ENDO STITCH 2-0 SURGIDAC 48 IN

## (undated) DEVICE — ENDO STITCH 2-0 VICRYL

## (undated) DEVICE — POWER SHELL SIGNIA

## (undated) DEVICE — ENDOPATH ECHELON ENDOSCOPIC LINEAR CUTTER RELOADS, WHITE, 60MM: Brand: ECHELON ENDOPATH

## (undated) DEVICE — ELECTRODE BLADE MOD E-Z CLEAN  2.75IN 7CM -0012AM

## (undated) DEVICE — GLOVE PI ULTRA TOUCH SZ.7.5

## (undated) DEVICE — LLETZ LOOP 20 X 15MM MEGADYNE

## (undated) DEVICE — TRAY FOLEY 16FR URIMETER SURESTEP

## (undated) DEVICE — GLOVE INDICATOR PI UNDERGLOVE SZ 8 BLUE

## (undated) DEVICE — GLOVE INDICATOR PI UNDERGLOVE SZ 6.5 BLUE

## (undated) DEVICE — GLOVE SRG BIOGEL 8

## (undated) DEVICE — TISSUE RETRIEVAL SYSTEM: Brand: INZII RETRIEVAL SYSTEM

## (undated) DEVICE — ECHELON FLEX POWERED PLUS ARTICULATING ENDOSCOPIC LINEAR CUTTER , 60MM: Brand: ECHELON FLEX

## (undated) DEVICE — COVIDIEN ENDO GIA PURPLE (MED) RELOAD 60MM

## (undated) DEVICE — TRAVELKIT CONTAINS FIRST STEP KIT (200ML EP-4 KIT) AND SOILED SCOPE BAG - 1 KIT: Brand: TRAVELKIT CONTAINS FIRST STEP KIT AND SOILED SCOPE BAG

## (undated) DEVICE — ADHESIVE SKIN HIGH VISCOSITY EXOFIN 1ML

## (undated) DEVICE — ELECTRODE BALL E-Z CLEAN 5 IN -0009

## (undated) DEVICE — ALLENTOWN LAP CHOLE APP PACK: Brand: CARDINAL HEALTH